# Patient Record
Sex: MALE | Race: WHITE | NOT HISPANIC OR LATINO | Employment: OTHER | URBAN - METROPOLITAN AREA
[De-identification: names, ages, dates, MRNs, and addresses within clinical notes are randomized per-mention and may not be internally consistent; named-entity substitution may affect disease eponyms.]

---

## 2018-12-20 ENCOUNTER — TELEPHONE (OUTPATIENT)
Dept: FAMILY MEDICINE CLINIC | Facility: CLINIC | Age: 73
End: 2018-12-20

## 2019-12-04 ENCOUNTER — APPOINTMENT (EMERGENCY)
Dept: CT IMAGING | Facility: HOSPITAL | Age: 74
End: 2019-12-04
Payer: COMMERCIAL

## 2019-12-04 ENCOUNTER — HOSPITAL ENCOUNTER (OUTPATIENT)
Facility: HOSPITAL | Age: 74
Setting detail: OBSERVATION
Discharge: HOME | End: 2019-12-04
Attending: EMERGENCY MEDICINE | Admitting: FAMILY MEDICINE
Payer: COMMERCIAL

## 2019-12-04 ENCOUNTER — APPOINTMENT (EMERGENCY)
Dept: RADIOLOGY | Facility: HOSPITAL | Age: 74
End: 2019-12-04
Payer: COMMERCIAL

## 2019-12-04 VITALS
TEMPERATURE: 98.4 F | OXYGEN SATURATION: 98 % | DIASTOLIC BLOOD PRESSURE: 82 MMHG | SYSTOLIC BLOOD PRESSURE: 165 MMHG | HEIGHT: 71 IN | RESPIRATION RATE: 16 BRPM | WEIGHT: 207.23 LBS | BODY MASS INDEX: 29.01 KG/M2 | HEART RATE: 58 BPM

## 2019-12-04 DIAGNOSIS — R07.9 CHEST PAIN: Primary | ICD-10-CM

## 2019-12-04 PROBLEM — Z98.61 CAD S/P PERCUTANEOUS CORONARY ANGIOPLASTY: Status: ACTIVE | Noted: 2019-12-04

## 2019-12-04 PROBLEM — C61 PROSTATE CANCER (HCC): Status: ACTIVE | Noted: 2019-12-04

## 2019-12-04 PROBLEM — E78.5 HYPERLIPIDEMIA: Status: ACTIVE | Noted: 2019-12-04

## 2019-12-04 PROBLEM — R00.1 BRADYCARDIA: Status: ACTIVE | Noted: 2019-12-04

## 2019-12-04 PROBLEM — I25.10 CAD S/P PERCUTANEOUS CORONARY ANGIOPLASTY: Status: ACTIVE | Noted: 2019-12-04

## 2019-12-04 PROBLEM — I10 HYPERTENSION: Status: ACTIVE | Noted: 2019-12-04

## 2019-12-04 PROBLEM — M10.9 GOUT: Status: ACTIVE | Noted: 2019-12-04

## 2019-12-04 LAB
ANION GAP SERPL CALCULATED.3IONS-SCNC: 6 MMOL/L (ref 4–13)
APTT PPP: 30 SECONDS (ref 23–37)
ATRIAL RATE: 47 BPM
ATRIAL RATE: 51 BPM
ATRIAL RATE: 55 BPM
BASOPHILS # BLD AUTO: 0.03 THOUSANDS/ΜL (ref 0–0.1)
BASOPHILS NFR BLD AUTO: 0 % (ref 0–1)
BUN SERPL-MCNC: 12 MG/DL (ref 5–25)
CALCIUM SERPL-MCNC: 9 MG/DL (ref 8.3–10.1)
CHLORIDE SERPL-SCNC: 108 MMOL/L (ref 100–108)
CHOLEST SERPL-MCNC: 123 MG/DL (ref 50–200)
CO2 SERPL-SCNC: 31 MMOL/L (ref 21–32)
CREAT SERPL-MCNC: 0.97 MG/DL (ref 0.6–1.3)
EOSINOPHIL # BLD AUTO: 0.21 THOUSAND/ΜL (ref 0–0.61)
EOSINOPHIL NFR BLD AUTO: 3 % (ref 0–6)
ERYTHROCYTE [DISTWIDTH] IN BLOOD BY AUTOMATED COUNT: 14.1 % (ref 11.6–15.1)
EST. AVERAGE GLUCOSE BLD GHB EST-MCNC: 111 MG/DL
GFR SERPL CREATININE-BSD FRML MDRD: 77 ML/MIN/1.73SQ M
GLUCOSE SERPL-MCNC: 91 MG/DL (ref 65–140)
HBA1C MFR BLD: 5.5 % (ref 4.2–6.3)
HCT VFR BLD AUTO: 40.9 % (ref 36.5–49.3)
HDLC SERPL-MCNC: 46 MG/DL
HGB BLD-MCNC: 13.2 G/DL (ref 12–17)
IMM GRANULOCYTES # BLD AUTO: 0.01 THOUSAND/UL (ref 0–0.2)
IMM GRANULOCYTES NFR BLD AUTO: 0 % (ref 0–2)
INR PPP: 1 (ref 0.84–1.19)
LDLC SERPL CALC-MCNC: 66 MG/DL (ref 0–100)
LYMPHOCYTES # BLD AUTO: 2.23 THOUSANDS/ΜL (ref 0.6–4.47)
LYMPHOCYTES NFR BLD AUTO: 33 % (ref 14–44)
MCH RBC QN AUTO: 33.3 PG (ref 26.8–34.3)
MCHC RBC AUTO-ENTMCNC: 32.3 G/DL (ref 31.4–37.4)
MCV RBC AUTO: 103 FL (ref 82–98)
MONOCYTES # BLD AUTO: 0.97 THOUSAND/ΜL (ref 0.17–1.22)
MONOCYTES NFR BLD AUTO: 14 % (ref 4–12)
NEUTROPHILS # BLD AUTO: 3.28 THOUSANDS/ΜL (ref 1.85–7.62)
NEUTS SEG NFR BLD AUTO: 50 % (ref 43–75)
NONHDLC SERPL-MCNC: 77 MG/DL
NRBC BLD AUTO-RTO: 0 /100 WBCS
P AXIS: 61 DEGREES
P AXIS: 69 DEGREES
P AXIS: 93 DEGREES
PLATELET # BLD AUTO: 244 THOUSANDS/UL (ref 149–390)
PLATELET # BLD AUTO: 282 THOUSANDS/UL (ref 149–390)
PMV BLD AUTO: 10 FL (ref 8.9–12.7)
PMV BLD AUTO: 9.8 FL (ref 8.9–12.7)
POTASSIUM SERPL-SCNC: 4 MMOL/L (ref 3.5–5.3)
PR INTERVAL: 176 MS
PR INTERVAL: 194 MS
PR INTERVAL: 216 MS
PROTHROMBIN TIME: 12.6 SECONDS (ref 11.6–14.5)
QRS AXIS: -49 DEGREES
QRS AXIS: -49 DEGREES
QRS AXIS: -53 DEGREES
QRSD INTERVAL: 132 MS
QRSD INTERVAL: 136 MS
QRSD INTERVAL: 142 MS
QT INTERVAL: 444 MS
QT INTERVAL: 478 MS
QT INTERVAL: 498 MS
QTC INTERVAL: 424 MS
QTC INTERVAL: 440 MS
QTC INTERVAL: 440 MS
RBC # BLD AUTO: 3.96 MILLION/UL (ref 3.88–5.62)
SODIUM SERPL-SCNC: 145 MMOL/L (ref 136–145)
T WAVE AXIS: 33 DEGREES
T WAVE AXIS: 43 DEGREES
T WAVE AXIS: 62 DEGREES
TRIGL SERPL-MCNC: 55 MG/DL
TROPONIN I SERPL-MCNC: 0.03 NG/ML
TROPONIN I SERPL-MCNC: 0.04 NG/ML
TROPONIN I SERPL-MCNC: 0.04 NG/ML
VENTRICULAR RATE: 47 BPM
VENTRICULAR RATE: 51 BPM
VENTRICULAR RATE: 55 BPM
WBC # BLD AUTO: 6.73 THOUSAND/UL (ref 4.31–10.16)

## 2019-12-04 PROCEDURE — 99236 HOSP IP/OBS SAME DATE HI 85: CPT | Performed by: FAMILY MEDICINE

## 2019-12-04 PROCEDURE — 99284 EMERGENCY DEPT VISIT MOD MDM: CPT | Performed by: EMERGENCY MEDICINE

## 2019-12-04 PROCEDURE — 36415 COLL VENOUS BLD VENIPUNCTURE: CPT | Performed by: EMERGENCY MEDICINE

## 2019-12-04 PROCEDURE — 99285 EMERGENCY DEPT VISIT HI MDM: CPT

## 2019-12-04 PROCEDURE — 85730 THROMBOPLASTIN TIME PARTIAL: CPT | Performed by: EMERGENCY MEDICINE

## 2019-12-04 PROCEDURE — 36415 COLL VENOUS BLD VENIPUNCTURE: CPT | Performed by: PHYSICIAN ASSISTANT

## 2019-12-04 PROCEDURE — 93010 ELECTROCARDIOGRAM REPORT: CPT | Performed by: INTERNAL MEDICINE

## 2019-12-04 PROCEDURE — 71275 CT ANGIOGRAPHY CHEST: CPT

## 2019-12-04 PROCEDURE — 84484 ASSAY OF TROPONIN QUANT: CPT | Performed by: PHYSICIAN ASSISTANT

## 2019-12-04 PROCEDURE — 80061 LIPID PANEL: CPT | Performed by: PHYSICIAN ASSISTANT

## 2019-12-04 PROCEDURE — 80048 BASIC METABOLIC PNL TOTAL CA: CPT | Performed by: EMERGENCY MEDICINE

## 2019-12-04 PROCEDURE — 74174 CTA ABD&PLVS W/CONTRAST: CPT

## 2019-12-04 PROCEDURE — 83036 HEMOGLOBIN GLYCOSYLATED A1C: CPT | Performed by: PHYSICIAN ASSISTANT

## 2019-12-04 PROCEDURE — 71046 X-RAY EXAM CHEST 2 VIEWS: CPT

## 2019-12-04 PROCEDURE — 85610 PROTHROMBIN TIME: CPT | Performed by: EMERGENCY MEDICINE

## 2019-12-04 PROCEDURE — 84484 ASSAY OF TROPONIN QUANT: CPT | Performed by: EMERGENCY MEDICINE

## 2019-12-04 PROCEDURE — 85049 AUTOMATED PLATELET COUNT: CPT | Performed by: PHYSICIAN ASSISTANT

## 2019-12-04 PROCEDURE — 85025 COMPLETE CBC W/AUTO DIFF WBC: CPT | Performed by: EMERGENCY MEDICINE

## 2019-12-04 PROCEDURE — 93005 ELECTROCARDIOGRAM TRACING: CPT

## 2019-12-04 RX ORDER — ATORVASTATIN CALCIUM 40 MG/1
40 TABLET, FILM COATED ORAL DAILY
COMMUNITY

## 2019-12-04 RX ORDER — ATORVASTATIN CALCIUM 40 MG/1
40 TABLET, FILM COATED ORAL DAILY
Status: DISCONTINUED | OUTPATIENT
Start: 2019-12-04 | End: 2019-12-04 | Stop reason: HOSPADM

## 2019-12-04 RX ORDER — FAMOTIDINE 20 MG/1
20 TABLET, FILM COATED ORAL 2 TIMES DAILY
Status: DISCONTINUED | OUTPATIENT
Start: 2019-12-04 | End: 2019-12-04 | Stop reason: HOSPADM

## 2019-12-04 RX ORDER — MELATONIN
1000 DAILY
COMMUNITY

## 2019-12-04 RX ORDER — ASPIRIN 81 MG/1
81 TABLET, CHEWABLE ORAL DAILY
Status: DISCONTINUED | OUTPATIENT
Start: 2019-12-04 | End: 2019-12-04 | Stop reason: HOSPADM

## 2019-12-04 RX ORDER — MELATONIN
1000 DAILY
Status: DISCONTINUED | OUTPATIENT
Start: 2019-12-04 | End: 2019-12-04 | Stop reason: HOSPADM

## 2019-12-04 RX ORDER — RAMIPRIL 5 MG/1
5 CAPSULE ORAL 2 TIMES DAILY
COMMUNITY

## 2019-12-04 RX ORDER — ACETAMINOPHEN 325 MG/1
650 TABLET ORAL EVERY 6 HOURS PRN
Status: DISCONTINUED | OUTPATIENT
Start: 2019-12-04 | End: 2019-12-04 | Stop reason: HOSPADM

## 2019-12-04 RX ORDER — ASPIRIN 81 MG/1
81 TABLET, CHEWABLE ORAL DAILY
COMMUNITY

## 2019-12-04 RX ORDER — ALLOPURINOL 300 MG/1
300 TABLET ORAL DAILY
COMMUNITY

## 2019-12-04 RX ORDER — ALLOPURINOL 300 MG/1
300 TABLET ORAL DAILY
Status: DISCONTINUED | OUTPATIENT
Start: 2019-12-04 | End: 2019-12-04 | Stop reason: HOSPADM

## 2019-12-04 RX ORDER — CALCIUM CARBONATE 200(500)MG
1000 TABLET,CHEWABLE ORAL DAILY PRN
Status: DISCONTINUED | OUTPATIENT
Start: 2019-12-04 | End: 2019-12-04 | Stop reason: HOSPADM

## 2019-12-04 RX ORDER — LISINOPRIL 5 MG/1
20 TABLET ORAL DAILY
Status: DISCONTINUED | OUTPATIENT
Start: 2019-12-04 | End: 2019-12-04 | Stop reason: HOSPADM

## 2019-12-04 RX ORDER — RANITIDINE 150 MG/1
150 CAPSULE ORAL 2 TIMES DAILY
COMMUNITY
End: 2021-01-12

## 2019-12-04 RX ADMIN — FAMOTIDINE 20 MG: 20 TABLET, FILM COATED ORAL at 09:25

## 2019-12-04 RX ADMIN — LISINOPRIL 20 MG: 10 TABLET ORAL at 09:25

## 2019-12-04 RX ADMIN — IOHEXOL 100 ML: 350 INJECTION, SOLUTION INTRAVENOUS at 03:42

## 2019-12-04 RX ADMIN — ALLOPURINOL 300 MG: 300 TABLET ORAL at 09:25

## 2019-12-04 NOTE — UTILIZATION REVIEW
Initial Clinical Review    Admission: Date/Time/Statement: 12/4/2019 0450 Observation   Orders Placed This Encounter   Procedures    Place in Observation     Standing Status:   Standing     Number of Occurrences:   1     Order Specific Question:   Admitting Physician     Answer:   Alex Singh [[id number]]     Order Specific Question:   Level of Care     Answer:   Med Surg [[id number]]     ED Arrival Information     Expected Arrival Acuity Means of Arrival Escorted By Service Admission Type    - 12/4/2019 02:14 Urgent Walk-In Self Hospitalist Urgent    Arrival Complaint    Chest pain,back pain        Chief Complaint   Patient presents with    Arm Pain     Patient reports left "pectoral" muscle feels rock hard  Patient also reports top of his left back/shoulder   Shoulder Pain     Assessment/Plan: this is a 69year old male from home to ED admitted to observation due to chest pain  Has history of CAD S/P percutaneous coronary angioplasty  Presented due to intermittent and worsening pain left side of chest over pectoral muscle  Had self resolved, today with shortness of breath  Troponin 0 03  EKG showed sinus with bifascicular block, non specific ST and T  Heart score of 5  Repeat EKG showed T wave inversion in lateral leads  Telemetry, serial troponin in progress       ED Triage Vitals   Temperature Pulse Respirations Blood Pressure SpO2   12/04/19 0236 12/04/19 0223 12/04/19 0223 12/04/19 0223 12/04/19 0223   98 5 °F (36 9 °C) 63 22 (!) 205/91 98 %      Temp Source Heart Rate Source Patient Position - Orthostatic VS BP Location FiO2 (%)   12/04/19 0236 12/04/19 0223 12/04/19 0223 12/04/19 0223 --   Oral Monitor Lying Right arm       Pain Score       12/04/19 0500       No Pain        Wt Readings from Last 1 Encounters:   12/04/19 94 kg (207 lb 3 7 oz)     Additional Vital Signs:   12/04/19 0546  98 4 °F (36 9 °C)  58  16  165/82  98 %  None (Room air)  Sitting   12/04/19 0500  --  56  16  152/82  97 %  None (Room air)  Sitting   12/04/19 0430  --  53Abnormal   17  173/78Abnormal   97 %  None (Room air)         Heart Score Risk Calculator   History  0 Filed at: 12/04/2019 0446   ECG  1 Filed at: 12/04/2019 0446   Age  2 Filed at: 12/04/2019 0446   Risk Factors  2 Filed at: 12/04/2019 0446   Troponin  0 Filed at: 12/04/2019 0446   HEART Score  5 Filed at: 12/04/2019 0446       Pertinent Labs/Diagnostic Test Results:   12/4/2019 EKG - rate of 55, sinus, left axis deviation, , bifascicular block, nonspecific ST and T-waves    12/4/2019 CTA dissection protocol chest/abdomen - No evidence of aortic aneurysm or dissection   No evidence of acute pathology throughout the chest, abdomen or pelvis    12/4/2019 CxR- No acute cardiopulmonary disease   Large hiatal hernia  Results from last 7 days   Lab Units 12/04/19  0607 12/04/19  0248   WBC Thousand/uL  --  6 73   HEMOGLOBIN g/dL  --  13 2   HEMATOCRIT %  --  40 9   PLATELETS Thousands/uL 244 282   NEUTROS ABS Thousands/µL  --  3 28     Results from last 7 days   Lab Units 12/04/19  0248   SODIUM mmol/L 145   POTASSIUM mmol/L 4 0   CHLORIDE mmol/L 108   CO2 mmol/L 31   ANION GAP mmol/L 6   BUN mg/dL 12   CREATININE mg/dL 0 97   EGFR ml/min/1 73sq m 77   CALCIUM mg/dL 9 0     Results from last 7 days   Lab Units 12/04/19  0248   GLUCOSE RANDOM mg/dL 91     Results from last 7 days   Lab Units 12/04/19  0607   HEMOGLOBIN A1C % 5 5   EAG mg/dl 111     Results from last 7 days   Lab Units 12/04/19  0908 12/04/19  0607 12/04/19  0248   TROPONIN I ng/mL 0 04 0 04 0 03     Results from last 7 days   Lab Units 12/04/19  0248   PROTIME seconds 12 6   INR  1 00   PTT seconds 30     ED Treatment:   Medication Administration from 12/04/2019 0214 to 12/04/2019 1031       Date/Time Order Dose Route Action Comments     12/04/2019 0925 allopurinol (ZYLOPRIM) tablet 300 mg 300 mg Oral Given      12/04/2019 0925 lisinopril (ZESTRIL) tablet 20 mg 20 mg Oral Given      12/04/2019 0997 famotidine (PEPCID) tablet 20 mg 20 mg Oral Given      12/04/2019 0954 cholecalciferol (VITAMIN D3) tablet 1,000 Units 1,000 Units Oral Not Given         Past Medical History:   Diagnosis Date    Cardiac disease     Hypertension      Present on Admission:  **None**      Admitting Diagnosis: Chest pain [R07 9]  Age/Sex: 69 y o  male  Admission Orders: 12/4/2019 0450 Observation   Scheduled Medications:  Medications:  allopurinol 300 mg Oral Daily   aspirin 81 mg Oral Daily   atorvastatin 40 mg Oral Daily   cholecalciferol 1,000 Units Oral Daily   enoxaparin 40 mg Subcutaneous Daily   famotidine 20 mg Oral BID   lisinopril 20 mg Oral Daily   metoprolol tartrate 25 mg Oral Daily     Continuous IV Infusions: none     PRN Meds: not used   acetaminophen 650 mg Oral Q6H PRN   calcium carbonate 1,000 mg Oral Daily PRN     telemetry    Network Utilization Review Department  [email or web address]  ATTENTION: Please call with any questions or concerns to [phone] and carefully listen to the prompts so that you are directed to the right person  All voicemails are confidential   Venkat Angelo all requests for admission clinical reviews, approved or denied determinations and any other requests to dedicated fax number below belonging to the campus where the patient is receiving treatment   List of dedicated fax numbers for the Facilities:  FACILITY NAME UR FAX NUMBER   ADMISSION DENIALS (Administrative/Medical Necessity) [phone]   PARENT CHILD HEALTH (Maternity/NICU/Pediatrics) [phone]   [facility] [phone]   [facility] [phone]   [address] [facility] [phone]

## 2019-12-04 NOTE — ED NOTES
SLIM at bedside      Anthony Santiago, [facility]  12/04/19 7497

## 2019-12-04 NOTE — DISCHARGE SUMMARY
Discharge- Faiza De La Torre 1945, 69 y o  male MRN: [id number]    Unit/Bed#: ED 25 Encounter: [id number]    Primary Care Provider: Kb Connors MD   Date and time admitted to hospital: 12/4/2019  2:20 AM      CAD S/P percutaneous coronary angioplasty  Assessment & Plan  · Patient with history of myocardial infarction x2  · Patient status post stent x4  · Patient states he had his 1st MI in 2003, and his 2nd in 2013  · Patient states he had 4 stents placed at [facility]   · He follows up with them for Cardiology  · Continue metoprolol  · Continue statin  · Continue ACE-inhibitor  · He states that he had a stress test done approximately 1 year ago by Dr Alexander Sutherland of [facility]   No records are available of this, however he states it was normal and he did not have chest pain with his stress test   · Patient does walk on treadmill 5 times a week at the gym      Prostate cancer [facility])  Assessment & Plan  · Patient does have a history of prostate cancer  · Follows up with outpatient Urology every 3 months  · Last visit was in October per patient  · Is due to follow-up in January  · He states that is currently stable and is being monitored at this time  · Deferred outpatient urology for follow-up    Gout  Assessment & Plan  · Continue allopurinol    Hyperlipidemia  Assessment & Plan  · Continue statin  Bradycardia  Assessment & Plan  · Heart rate has been in the 50s  Asymptomatic  Okay to continue metoprolol  Hypertension  Assessment & Plan  · Continue metoprolol and ramipril  * Chest pain  Assessment & Plan   Patient Presentation:  Patient presented with left-sided pectoral chest pain which radiated to the back  States he had for about 15-20 minutes while sleeping  Has had this before     Likely etiology:  Believe this is noncardiac, and believe this is more musculoskeletal   Patient states that he has a heart attack   KATELYNN: 2    Patient does have a history of coronary artery disease with stent placement  His chest pain resolved and his troponins were negative x3  He did have some T-wave inversions in his lateral leads on 2/3 of his EKGs  I reviewed previous EKGs obtained from [facility] from March of this year  They did not show T-wave inversions in the lateral leads  However, he states he is able to walk on the treadmill without chest pain  I did discuss over the phone with his cardiologist Dr Chioma Little  He states that patient is okay for discharge and he can follow up with him today or tomorrow  Will discharge patient home  Patient's chest pain did resolve  Disposition:     Home     Reason for Admission:  Chest pain    Discharge Diagnoses:     Principal Problem:    Chest pain  Active Problems:    Hypertension    Bradycardia    Hyperlipidemia    Gout    Prostate cancer ([facility] [address] )    CAD S/P percutaneous coronary angioplasty  Resolved Problems:    * No resolved hospital problems  *      Consultations During Hospital Stay:  · None    Procedures Performed:     · None    Significant Findings / Test Results:     CTA dissection protocol chest abdomen pelvis w wo contrast [[id number]] Collected: 12/04/19 0423   Order Status: Completed Updated: 12/04/19 0432   Narrative:     CT ANGIOGRAM OF THE CHEST, ABDOMEN AND PELVIS WITH AND WITHOUT IV CONTRAST    INDICATION:  None    COMPARISON: None      TECHNIQUE:  CT angiogram examination of the chest, abdomen and pelvis was performed according to standard protocol   This examination, like all CT scans performed in the [facility], was performed utilizing techniques to minimize   radiation dose exposure, including the use of iterative reconstruction and automated exposure control    Contrast as well as noncontrast images were obtained   3D reconstructions were performed an independent workstation, and are supplied for review     Rad dose 2647 mGy-cm     IV Contrast:  100 mL of iohexol (OMNIPAQUE)  Enteric Contrast:  None    FINDINGS:    VASCULAR STRUCTURES:  No evidence of aortic aneurysm or dissection   Mild calcific atherosclerotic disease throughout the thoracoabdominal aorta  OTHER FINDINGS:     CHEST:     HEART:  Severe coronary calcification   Normal cardiac size  No pericardial effusion  LUNGS:  Unremarkable  PLEURA: No pleural effusion  MEDIASTINUM AND DALLIN:  No mass or significant lymphadenopathy   Large hiatal hernia  CHEST WALL AND LOWER NECK: Normal     ABDOMEN    LIVER/BILIARY TREE:  Scattered hepatic cysts  Jimenez Kayla GALLBLADDER:  No calcified gallstones  No pericholecystic inflammatory change  SPLEEN:  Unremarkable   Normal size  PANCREAS:  Unremarkable  ADRENAL GLANDS:  Unremarkable  KIDNEYS/URETERS:  No solid renal mass  No hydronephrosis  No urinary tract calculi  PELVIS    REPRODUCTIVE ORGANS:  Unremarkable for patient's age  URINARY BLADDER:  Unremarkable  ADDITIONAL ABDOMINAL AND PELVIC STRUCTURES:     STOMACH AND BOWEL:  Unremarkable  ABDOMINOPELVIC CAVITY:  No pathologically enlarged mesenteric or retroperitoneal lymph nodes  No ascites or free intraperitoneal air  ABDOMINAL WALL/INGUINAL REGIONS:  Unremarkable  OSSEOUS STRUCTURES:  No acute fracture or destructive osseous lesion  Impression:       No evidence of aortic aneurysm or dissection   No evidence of acute pathology throughout the chest, abdomen or pelvis  Workstation performed: [id number]   XR chest 2 views [[id number]] Collected: 12/04/19 0840   Order Status: Completed Updated: 12/04/19 0843   Narrative:     CHEST     INDICATION:   sob  COMPARISON: Yue [facility] PERFORMED/VIEWS: [facility] [place] CHEST PA & LATERAL  Images: 2    FINDINGS:    Cardiomediastinal silhouette appears unremarkable  Genene Nan is a large hiatal hernia  Genene Nan is no evidence of heart failure  The lungs are clear   No pneumothorax or pleural effusion  Osseous structures appear within normal limits for patient age  Impression:       No acute cardiopulmonary disease   Large hiatal hernia  ·     Incidental Findings:   · None     Test Results Pending at Discharge (will require follow up): · None     Outpatient Tests Requested:  · None    Complications:  None    Hospital Course:     Patient was admitted for chest pain  He does have a history of coronary artery disease and goes to [facility]  His troponins were negative x3  His chest pain resolved  His EKGs did show some T-wave inversion in the lateral leads  Nonetheless, I discussed him with his outpatient cardiologist who stated that he should be fine for discharge and he can follow up today or tomorrow  Patient was then discharged  Condition at Discharge: stable     Discharge Day Visit / Exam:     * Please refer to separate progress note for these details *    Discharge instructions/Information to patient and family:   See after visit summary for information provided to patient and family  Provisions for Follow-Up Care:  See after visit summary for information related to follow-up care and any pertinent home health orders  Planned Readmission:  None    Discharge Statement:  I spent 30 minutes discharging the patient  This time was spent on the day of discharge  I had direct contact with the patient on the day of discharge  Greater than 50% of the total time was spent examining patient, answering all patient questions, arranging and discussing plan of care with patient as well as directly providing post-discharge instructions  Additional time then spent on discharge activities  Discharge Medications:  See after visit summary for reconciled discharge medications provided to patient and family  ** Please Note: This note has been constructed using a voice recognition system   **

## 2019-12-04 NOTE — ASSESSMENT & PLAN NOTE
· Patient with history of myocardial infarction x2  · Patient status post stent x4  · Patient states he had his 1st MI in 2003, and his 2nd in 2013  · Patient states he had 4 stents placed at [facility]   · He follows up with them for Cardiology  ·  he states it is stable at this time and has an appoint with his cardiologist later this week  · Continue metoprolol  · Continue statin  · Continue ACE-inhibitor    · He states that he had a stress test done approximately 1 year ago by Dr Tonio Christy of [facility]   No records are available of this, however he states it was normal and he did not have chest pain with his stress test   · Patient does walk on treadmill 5 times a week at the gym

## 2019-12-04 NOTE — ASSESSMENT & PLAN NOTE
· Patient does have a history of prostate cancer  · Follows up with outpatient Urology every 3 months  · Last visit was in October per patient  · Is due to follow-up in January  · He states that is currently stable and is being monitored at this time    · Deferred outpatient urology for follow-up

## 2019-12-04 NOTE — ASSESSMENT & PLAN NOTE
· Patient was sinus bradycardia with rates in the high 50s  · Patient did take metoprolol at night as he was instructed to by his cardiologist   · Patient is asymptomatic at this time  · Believe this is related to medications  · Will continue to monitor on telemetry

## 2019-12-04 NOTE — ASSESSMENT & PLAN NOTE
· Patient's blood pressures were significantly elevated on admission at 886 systolic  · Most recent pressure is 711 systolic  · Will continue to monitor  · Continue ramipril 5 mg  · Continue metoprolol

## 2019-12-04 NOTE — ASSESSMENT & PLAN NOTE
 Patient Presentation:  Patient presented with left-sided pectoral chest pain which radiated to the back  States he had for about 15-20 minutes while sleeping  Has had this before   Likely etiology:  Believe this is noncardiac, and believe this is more musculoskeletal   Patient states that he has a heart attack   KATELYNN: 2   Initial Troponin: negative   o Trend x3 or to peak   Initial EKG:  Right bundle-branch block with left anterior Vasc block  No ST T wave changes concerning for ischemia  EKG was bradycardic   o Monitor on telemetry   Check fasting lipid panel and A1c   Admit under Observation Status   Patient does follow with outpatient cardiology state and is due to see them later this month   Patient denies any chest pain or anginal symptoms at this time

## 2019-12-04 NOTE — H&P
H&P- Alisha Plasencia 1945, 69 y o  male MRN: [id number]    Unit/Bed#: ED 25 Encounter: [id number]    Primary Care Provider: Chloe Frank MD   Date and time admitted to hospital: 12/4/2019  2:20 AM        * Chest pain  Assessment & Plan   Patient Presentation:  Patient presented with left-sided pectoral chest pain which radiated to the back  States he had for about 15-20 minutes while sleeping  Has had this before   Likely etiology:  Believe this is noncardiac, and believe this is more musculoskeletal   Patient states that he has a heart attack   KATELYNN: 2   Initial Troponin: negative   o Trend x3 or to peak   Initial EKG:  Right bundle-branch block with left anterior Vasc block  No ST T wave changes concerning for ischemia  EKG was bradycardic   o Monitor on telemetry   Check fasting lipid panel and A1c   Admit under Observation Status   Patient does follow with outpatient cardiology state and is due to see them later this month   Patient denies any chest pain or anginal symptoms at this time  CAD S/P percutaneous coronary angioplasty  Assessment & Plan  · Patient with history of myocardial infarction x2  · Patient status post stent x4  · Patient states he had his 1st MI in 2003, and his 2nd in 2013  · Patient states he had 4 stents placed at [facility]   · He follows up with them for Cardiology  ·  he states it is stable at this time and has an appoint with his cardiologist later this week  · Continue metoprolol  · Continue statin  · Continue ACE-inhibitor  · He states that he had a stress test done approximately 1 year ago by Dr John Singh of [facility]   No records are available of this, however he states it was normal and he did not have chest pain with his stress test   · Patient does walk on treadmill 5 times a week at the gym      Prostate cancer [facility])  Assessment & Plan  · Patient does have a history of prostate cancer    · Follows up with outpatient Urology every 3 months  · Last visit was in October per patient  · Is due to follow-up in January  · He states that is currently stable and is being monitored at this time  · Deferred outpatient urology for follow-up    Hypertension  Assessment & Plan  · Patient's blood pressures were significantly elevated on admission at 880 systolic  · Most recent pressure is 010 systolic  · Will continue to monitor  · Continue ramipril 5 mg  · Continue metoprolol  Bradycardia  Assessment & Plan  · Patient was sinus bradycardia with rates in the high 50s  · Patient did take metoprolol at night as he was instructed to by his cardiologist   · Patient is asymptomatic at this time  · Believe this is related to medications  · Will continue to monitor on telemetry  Hyperlipidemia  Assessment & Plan  · Hyperlipidemia currently on statin  · Will check fasting lipids  · Continue statin    Gout  Assessment & Plan  · Continue allopurinol    VTE Prophylaxis: Enoxaparin (Lovenox)  / sequential compression device   Code Status:  Full code  POLST: There is no POLST form on file for this patient (pre-hospital)  Discussion with family:  Discussed with patient    Anticipated Length of Stay:  Patient will be admitted on an Observation basis with an anticipated length of stay of  < 2 midnights  Justification for Hospital Stay:  Chest pain rule out    Total Time for Visit, including Counseling / Coordination of Care: 45 minutes  Greater than 50% of this total time spent on direct patient counseling and coordination of care  Chief Complaint:   Left-sided chest pain with left-sided upper back pain    History of Present Illness:    Kenji Pantoja is a 69 y o  male who presents with left-sided chest pain  Patient's past medical history of hyperlipidemia, hypertension, coronary artery disease status post stenting x4, myocardial infarction x2 in 2013, in 2003, prostate cancer      Patient presents to the ED with one-day history of left-sided pectoral chest tightness with associated left-sided back pain  Patient states that he was sleeping when the symptoms occurred  He states that he has had these for the past 3 days  He states that the symptoms last approximately 15-20 minutes when they start  Patient states that he was sleeping when he 1st noticed the chest tightness happened  He denies any provocative factors  Patient states that he did not take anything for his pain  He denies any associated symptoms such as diaphoresis with the chest pain, he also denies any numbness tingling or pain in his arm or jaw  He does however state that he was having some slight back pain  He denies any shortness of breath with these symptoms  When asked how he is feeling now he states I feel great  Patient states that he does walk on a treadmill at the gym 5 days a week  He states that he was walking on the treadmill today and did not have any symptoms  Patient does follow up with outpatient Cardiology through [facility]   Patient states that he had a stress test approximately 1 year ago performed by Brant Kay at [facility]   No records are available to us of this  He does state however that the stress test was "normal" and that he did not have any pain with it  He states that the pain with the symptoms was approximately a 4/10 and dissipated completely upon arrival to the ED  He describes that when he was having his symptoms he reached out and touched his left pectoral muscle and that it felt hard like a rock  Of note patient did have significantly elevated blood pressure in the ED of 682 systolic  Blood pressures subsequently came down to 273 systolic during my exam   CTA was done in the ED to rule out dissection  CTA done in the ER was negative  Patient denies any symptoms of fever, chills, chest pain, shortness of breath, abdominal pain, nausea, vomiting, diarrhea at this time      Review of Systems:    Review of Systems Constitutional: Negative for chills, fatigue, fever and unexpected weight change  Respiratory: Positive for chest tightness  Negative for cough and shortness of breath  Cardiovascular: Positive for leg swelling  Negative for chest pain and palpitations  Does have what leg swelling but states that this is normal    Gastrointestinal: Negative for abdominal pain, diarrhea, nausea and vomiting  Genitourinary: Negative for decreased urine volume, dysuria, frequency and urgency  Musculoskeletal: Positive for back pain  Negative for arthralgias  Neurological: Negative for dizziness, syncope, light-headedness and headaches  All other systems reviewed and are negative  Past Medical and Surgical History:     Past Medical History:   Diagnosis Date    Hypertension        Past Surgical History:   Procedure Laterality Date    CARDIAC SURGERY      patient reports having 4 stents placed    TONSILLECTOMY         Meds/Allergies:    Prior to Admission medications    Medication Sig Start Date End Date Taking? Authorizing Provider   allopurinol (ZYLOPRIM) 300 mg tablet Take 300 mg by mouth daily   Yes Historical Provider, MD   aspirin 81 mg chewable tablet Chew 81 mg daily   Yes Historical Provider, MD   atorvastatin (LIPITOR) 40 mg tablet Take 40 mg by mouth daily   Yes Historical Provider, MD   cholecalciferol (VITAMIN D3) 1,000 units tablet Take 1,000 Units by mouth daily   Yes Historical Provider, MD   metoprolol tartrate (LOPRESSOR) 25 mg tablet Take 25 mg by mouth daily   Yes Historical Provider, MD   ramipril (ALTACE) 5 mg capsule Take 5 mg by mouth 2 (two) times a day   Yes Historical Provider, MD   ranitidine (ZANTAC) 150 MG capsule Take 150 mg by mouth 2 (two) times a day   Yes Historical Provider, MD     I have reviewed home medications with patient personally      Allergies: No Known Allergies    Social History:     Marital Status: /Civil Union   Occupation:  Retired  Patient Pre-hospital Living Situation:  Lives at home  Patient Pre-hospital Level of Mobility:  Ambulates  Patient Pre-hospital Diet Restrictions:  None  Substance Use History:   Social History     Substance and Sexual Activity   Alcohol Use Not on file     Social History     Tobacco Use   Smoking Status Not on file     Social History     Substance and Sexual Activity   Drug Use Not on file       Family History:    History reviewed  No pertinent family history  Physical Exam:     Vitals:   Blood Pressure: 165/82 (12/04/19 0546)  Pulse: 58 (12/04/19 0546)  Temperature: 98 4 °F (36 9 °C) (12/04/19 0546)  Temp Source: Oral (12/04/19 0546)  Respirations: 16 (12/04/19 0546)  Height: 5' 11" (180 3 cm) (12/04/19 0546)  Weight - Scale: 94 kg (207 lb 3 7 oz) (12/04/19 0546)  SpO2: 98 % (12/04/19 0546)    Physical Exam   Constitutional: He is oriented to person, place, and time  He appears well-developed and well-nourished  HENT:   Head: Normocephalic and atraumatic  Eyes: EOM are normal    Cardiovascular: Regular rhythm  Bradycardia present  Exam reveals distant heart sounds  No murmur heard  Pulmonary/Chest: Effort normal and breath sounds normal  He has no wheezes  He has no rales  Abdominal: Soft  Bowel sounds are normal  He exhibits no distension  There is no tenderness  Musculoskeletal: He exhibits edema  Patient does have left-sided lower extremity edema greater than right side  He does have pitting edema bilaterally  He states that the unilateral leg swelling is his baseline, and that is from surgery as a kid  Neurological: He is alert and oriented to person, place, and time  No cranial nerve deficit or sensory deficit  Skin: Skin is warm and dry  Nursing note and vitals reviewed  Additional Data:     Lab Results: I have personally reviewed pertinent reports        Results from last 7 days   Lab Units 12/04/19  0248   WBC Thousand/uL 6 73   HEMOGLOBIN g/dL 13 2   HEMATOCRIT % 40 9   PLATELETS Thousands/uL 282 NEUTROS PCT % 50   LYMPHS PCT % 33   MONOS PCT % 14*   EOS PCT % 3     Results from last 7 days   Lab Units 12/04/19  0248   SODIUM mmol/L 145   POTASSIUM mmol/L 4 0   CHLORIDE mmol/L 108   CO2 mmol/L 31   BUN mg/dL 12   CREATININE mg/dL 0 97   ANION GAP mmol/L 6   CALCIUM mg/dL 9 0   GLUCOSE RANDOM mg/dL 91     Results from last 7 days   Lab Units 12/04/19  0248   INR  1 00                   Imaging: I have personally reviewed pertinent reports  CTA dissection protocol chest abdomen pelvis w wo contrast   Final Result by Jacinda Carson MD (12/04 0431)      No evidence of aortic aneurysm or dissection  No evidence of acute pathology throughout the chest, abdomen or pelvis  Workstation performed: [id number]         XR chest 2 views    (Results Pending)       EKG, Pathology, and Other Studies Reviewed on Admission:   · EKG:  Each EKG shows left anterior Vasc block, and right bundle branch block  No ST T wave changes concerning for ischemia  EKG is sinus bradycardia with rates 55  Allscripts / Epic Records Reviewed: Yes     ** Please Note: This note has been constructed using a voice recognition system   **

## 2019-12-04 NOTE — ED PROVIDER NOTES
History  Chief Complaint   Patient presents with    Arm Pain     Patient reports left "pectoral" muscle feels rock hard  Patient also reports top of his left back/shoulder   Shoulder Pain       History provided by:  Patient   used: No    Arm Pain   Associated symptoms: chest pain    Associated symptoms: no abdominal pain, no congestion, no cough, no diarrhea, no fever, no headaches, no nausea, no rash, no shortness of breath, no sore throat, no vomiting and no wheezing    Shoulder Pain   Associated symptoms: back pain    Associated symptoms: no fever and no neck pain      Patient is a 52-year-old male presenting to emergency department with intermittent pain for last few days on the left side of the chest over the pectoral muscle area  Self resolved  Today while in bed head sudden onset pain of the left upper back and shoulder area  Self resolved  Hypertensive initially  No fevers or chills  No nausea vomiting  Has some shortness of breath with the pain today  Self resolved  Was coughing  No known trauma  Did not lift any heavy objects  History of 4 stents  Follows at [facility]     MDM cardiac eval, dissection study, re-evaluate  Prior to Admission Medications   Prescriptions Last Dose Informant Patient Reported?  Taking?   allopurinol (ZYLOPRIM) 300 mg tablet 12/3/2019 at Unknown time  Yes Yes   Sig: Take 300 mg by mouth daily   aspirin 81 mg chewable tablet 12/3/2019 at Unknown time  Yes Yes   Sig: Chew 81 mg daily   atorvastatin (LIPITOR) 40 mg tablet 12/3/2019 at Unknown time  Yes Yes   Sig: Take 40 mg by mouth daily   cholecalciferol (VITAMIN D3) 1,000 units tablet 12/3/2019 at Unknown time  Yes Yes   Sig: Take 1,000 Units by mouth daily   metoprolol tartrate (LOPRESSOR) 25 mg tablet 12/3/2019 at Unknown time  Yes Yes   Sig: Take 25 mg by mouth daily   ramipril (ALTACE) 5 mg capsule 12/3/2019 at Unknown time  Yes Yes   Sig: Take 5 mg by mouth 2 (two) times a day ranitidine (ZANTAC) 150 MG capsule 12/3/2019 at Unknown time  Yes Yes   Sig: Take 150 mg by mouth 2 (two) times a day      Facility-Administered Medications: None       Past Medical History:   Diagnosis Date    Cardiac disease     Hypertension        Past Surgical History:   Procedure Laterality Date    CARDIAC SURGERY      patient reports having 4 stents placed    TONSILLECTOMY         History reviewed  No pertinent family history  I have reviewed and agree with the history as documented  Social History     Tobacco Use    Smoking status: Former Smoker     Years: 0 00    Smokeless tobacco: Never Used   Substance Use Topics    Alcohol use: Not Currently    Drug use: Never        Review of Systems   Constitutional: Negative for chills, diaphoresis and fever  HENT: Negative for congestion and sore throat  Respiratory: Negative for cough, shortness of breath, wheezing and stridor  Cardiovascular: Positive for chest pain  Negative for palpitations and leg swelling  Gastrointestinal: Negative for abdominal pain, blood in stool, diarrhea, nausea and vomiting  Genitourinary: Negative for dysuria, frequency and urgency  Musculoskeletal: Positive for back pain  Negative for neck pain and neck stiffness  Skin: Negative for pallor and rash  Neurological: Negative for dizziness, syncope, weakness, light-headedness and headaches  All other systems reviewed and are negative  Physical Exam  Physical Exam   Constitutional: He is oriented to person, place, and time  He appears well-developed and well-nourished  HENT:   Head: Normocephalic and atraumatic  Eyes: Pupils are equal, round, and reactive to light  Neck: Neck supple  Cardiovascular: Normal rate, regular rhythm, normal heart sounds and intact distal pulses  Pulmonary/Chest: Effort normal and breath sounds normal  No respiratory distress  Abdominal: Soft  Bowel sounds are normal  There is no tenderness     Musculoskeletal: Normal range of motion  Neurological: He is alert and oriented to person, place, and time  Skin: Skin is warm and dry  Capillary refill takes less than 2 seconds  No rash noted  No erythema  No pallor  Vitals reviewed        Vital Signs  ED Triage Vitals   Temperature Pulse Respirations Blood Pressure SpO2   12/04/19 0236 12/04/19 0223 12/04/19 0223 12/04/19 0223 12/04/19 0223   98 5 °F (36 9 °C) 63 22 (!) 205/91 98 %      Temp Source Heart Rate Source Patient Position - Orthostatic VS BP Location FiO2 (%)   12/04/19 0236 12/04/19 0223 12/04/19 0223 12/04/19 0223 --   Oral Monitor Lying Right arm       Pain Score       12/04/19 0500       No Pain           Vitals:    12/04/19 0232 12/04/19 0430 12/04/19 0500 12/04/19 0546   BP: 163/93 (!) 173/78 152/82 165/82   Pulse:  (!) 53 56 58   Patient Position - Orthostatic VS: Sitting Sitting Sitting Sitting         Visual Acuity      ED Medications  Medications   iohexol (OMNIPAQUE) 350 MG/ML injection (MULTI-DOSE) 100 mL (100 mL Intravenous Given 12/4/19 0342)       Diagnostic Studies  Results Reviewed     Procedure Component Value Units Date/Time    Hemoglobin A1C w/ EAG Estimation [[id number]] Collected:  12/04/19 0607    Lab Status:  Final result Specimen:  Blood from Arm, Left Updated:  12/04/19 1023     Hemoglobin A1C 5 5 %       mg/dl     Troponin I [[id number]] Collected:  12/04/19 0908    Lab Status:  Final result Specimen:  Blood Updated:  12/04/19 0931     Troponin I 0 04 ng/mL     Troponin I [[id number]] Collected:  12/04/19 0607    Lab Status:  Final result Specimen:  Blood from Arm, Left Updated:  12/04/19 0636     Troponin I 0 04 ng/mL     Lipid panel [[id number]] Collected:  12/04/19 0607    Lab Status:  Final result Specimen:  Blood from Arm, Left Updated:  12/04/19 9603     Cholesterol 123 mg/dL      Triglycerides 55 mg/dL      HDL, Direct 46 mg/dL      LDL Calculated 66 mg/dL      Non-HDL-Chol (CHOL-HDL) 77 mg/dl     Platelet count [[id number]] (Normal) Collected:  12/04/19 0607    Lab Status:  Final result Specimen:  Blood from Arm, Left Updated:  12/04/19 0619     Platelets 649 Thousands/uL      MPV 9 8 fL     Troponin I [[id number]]  (Normal) Collected:  12/04/19 0248    Lab Status:  Final result Specimen:  Blood from Arm, Left Updated:  12/04/19 0319     Troponin I 0 03 ng/mL     Basic metabolic panel [[id number]] Collected:  12/04/19 0248    Lab Status:  Final result Specimen:  Blood from Arm, Left Updated:  12/04/19 0314     Sodium 145 mmol/L      Potassium 4 0 mmol/L      Chloride 108 mmol/L      CO2 31 mmol/L      ANION GAP 6 mmol/L      BUN 12 mg/dL      Creatinine 0 97 mg/dL      Glucose 91 mg/dL      Calcium 9 0 mg/dL      eGFR 77 ml/min/1 73sq m     Narrative:       [place] guidelines for Chronic Kidney Disease (CKD):     Stage 1 with normal or high GFR (GFR > 90 mL/min/1 73 square meters)    Stage 2 Mild CKD (GFR = 60-89 mL/min/1 73 square meters)    Stage 3A Moderate CKD (GFR = 45-59 mL/min/1 73 square meters)    Stage 3B Moderate CKD (GFR = 30-44 mL/min/1 73 square meters)    Stage 4 Severe CKD (GFR = 15-29 mL/min/1 73 square meters)    Stage 5 End Stage CKD (GFR <15 mL/min/1 73 square meters)  Note: GFR calculation is accurate only with a steady state creatinine    Protime-INR [[id number]]  (Normal) Collected:  12/04/19 0248    Lab Status:  Final result Specimen:  Blood from Arm, Left Updated:  12/04/19 0310     Protime 12 6 seconds      INR 1 00    APTT [[id number]]  (Normal) Collected:  12/04/19 0248    Lab Status:  Final result Specimen:  Blood from Arm, Left Updated:  12/04/19 0310     PTT 30 seconds     CBC and differential [[id number]]  (Abnormal) Collected:  12/04/19 0248    Lab Status:  Final result Specimen:  Blood from Arm, Left Updated:  12/04/19 0259     WBC 6 73 Thousand/uL      RBC 3 96 Million/uL      Hemoglobin 13 2 g/dL      Hematocrit 40 9 %       fL      MCH 33 3 pg      MCHC 32 3 g/dL RDW 14 1 %      MPV 10 0 fL      Platelets 646 Thousands/uL      nRBC 0 /100 WBCs      Neutrophils Relative 50 %      Immat GRANS % 0 %      Lymphocytes Relative 33 %      Monocytes Relative 14 %      Eosinophils Relative 3 %      Basophils Relative 0 %      Neutrophils Absolute 3 28 Thousands/µL      Immature Grans Absolute 0 01 Thousand/uL      Lymphocytes Absolute 2 23 Thousands/µL      Monocytes Absolute 0 97 Thousand/µL      Eosinophils Absolute 0 21 Thousand/µL      Basophils Absolute 0 03 Thousands/µL                  CTA dissection protocol chest abdomen pelvis w wo contrast   Final Result by Brian Leiva MD (12/04 0431)      No evidence of aortic aneurysm or dissection  No evidence of acute pathology throughout the chest, abdomen or pelvis  Workstation performed: [id number]         XR chest 2 views   Final Result by Joseph Sahni MD (02/11 3172)      No acute cardiopulmonary disease  Large hiatal hernia              Workstation performed: [id number]                    Procedures  Procedures         ED Course  ED Course as of Dec 10 1444   Wed Dec 04, 2019   0239 ECG shows rate of 55, sinus, left axis deviation, , bifascicular block, nonspecific ST and T-waves, independently interpreted by me            HEART Risk Score      Most Recent Value   History  0 Filed at: 12/04/2019 0446   ECG  1 Filed at: 12/04/2019 0446   Age  2 Filed at: 12/04/2019 0446   Risk Factors  2 Filed at: 12/04/2019 0446   Troponin  0 Filed at: 12/04/2019 0446   Heart Score Risk Calculator   History  0 Filed at: 12/04/2019 0446   ECG  1 Filed at: 12/04/2019 0446   Age  2 Filed at: 12/04/2019 0446   Risk Factors  2 Filed at: 12/04/2019 0446   Troponin  0 Filed at: 12/04/2019 0446   HEART Score  5 Filed at: 12/04/2019 0446   HEART Score  5 Filed at: 12/04/2019 0446                    KATELYNN Risk Score      Most Recent Value   Age >= 72  1 Filed at: 12/04/2019 0522   Known CAD (stenosis >= 50%)  0 Filed at: 12/04/2019 0522   Recent (<=24 hrs) Service Angina  0 Filed at: 12/04/2019 0522   ST Deviation >= 0 5 mm  0 Filed at: 12/04/2019 0522   3+ CAD Risk Factors (FHx, HTN, HLP, DM, Smoker)  0 Filed at: 12/04/2019 0522   Aspirin Use Past 7 Days  0 Filed at: 12/04/2019 0522   Elevated Cardiac Markers  0 Filed at: 12/04/2019 0522   KTAELYNN Risk Score (Calculated)  1 Filed at: 12/04/2019 1263              MDM      Disposition  Final diagnoses:   Chest pain     Time reflects when diagnosis was documented in both MDM as applicable and the Disposition within this note     Time User Action Codes Description Comment    12/4/2019  4:50 AM Zenia Reinoso [R07 9] Chest pain       ED Disposition     ED Disposition Condition Date/Time Comment    Admit Stable Wed Dec 4, 2019  4:50 AM Case was discussed with medicine and the patient's admission status was agreed to be Admission Status: observation status to the service of Dr Viktor Washburn   Follow-up Information     Follow up With Specialties Details Why Manjeet Khan MD Family Medicine Follow up Follow-up within 1-2 weeks   [id number] [facility]            Discharge Medication List as of 12/4/2019 11:43 AM      CONTINUE these medications which have NOT CHANGED    Details   allopurinol (ZYLOPRIM) 300 mg tablet Take 300 mg by mouth daily, Historical Med      aspirin 81 mg chewable tablet Chew 81 mg daily, Historical Med      atorvastatin (LIPITOR) 40 mg tablet Take 40 mg by mouth daily, Historical Med      cholecalciferol (VITAMIN D3) 1,000 units tablet Take 1,000 Units by mouth daily, Historical Med      metoprolol tartrate (LOPRESSOR) 25 mg tablet Take 25 mg by mouth daily, Historical Med      ramipril (ALTACE) 5 mg capsule Take 5 mg by mouth 2 (two) times a day, Historical Med      ranitidine (ZANTAC) 150 MG capsule Take 150 mg by mouth 2 (two) times a day, Historical Med           Outpatient Discharge Orders   Call provider for:  severe uncontrolled pain     Call provider for:  persistent dizziness or light-headedness       ED Provider  Electronically Signed by           Petr Palomares MD  12/10/19 [phone]

## 2019-12-04 NOTE — ASSESSMENT & PLAN NOTE
· Patient with history of myocardial infarction x2  · Patient status post stent x4  · Patient states he had his 1st MI in 2003, and his 2nd in 2013  · Patient states he had 4 stents placed at [facility]   · He follows up with them for Cardiology  · Continue metoprolol  · Continue statin  · Continue ACE-inhibitor    · He states that he had a stress test done approximately 1 year ago by Dr Lani Brewer of [facility]   No records are available of this, however he states it was normal and he did not have chest pain with his stress test   · Patient does walk on treadmill 5 times a week at the gym

## 2019-12-04 NOTE — ED NOTES
Pt is resting comfortably at this time with no complaints  Pt states that he feels much better than when he came in    Awaiting records from [facility] for past [address]  Héctor Duran , RN  12/04/19 [id number]

## 2019-12-04 NOTE — ASSESSMENT & PLAN NOTE
 Patient Presentation:  Patient presented with left-sided pectoral chest pain which radiated to the back  States he had for about 15-20 minutes while sleeping  Has had this before   Likely etiology:  Believe this is noncardiac, and believe this is more musculoskeletal   Patient states that he has a heart attack   KATELYNN: 2    Patient does have a history of coronary artery disease with stent placement  His chest pain resolved and his troponins were negative x3  He did have some T-wave inversions in his lateral leads on 2/3 of his EKGs  I reviewed previous EKGs obtained from [facility] from March of this year  They did not show T-wave inversions in the lateral leads  However, he states he is able to walk on the treadmill without chest pain  I did discuss over the phone with his cardiologist Dr Chioma Little  He states that patient is okay for discharge and he can follow up with him today or tomorrow  Will discharge patient home  Patient's chest pain did resolve

## 2020-06-26 ENCOUNTER — APPOINTMENT (EMERGENCY)
Dept: RADIOLOGY | Facility: HOSPITAL | Age: 75
End: 2020-06-26
Payer: COMMERCIAL

## 2020-06-26 ENCOUNTER — HOSPITAL ENCOUNTER (OUTPATIENT)
Facility: HOSPITAL | Age: 75
Setting detail: OBSERVATION
Discharge: HOME | End: 2020-06-27
Attending: EMERGENCY MEDICINE | Admitting: HOSPITALIST
Payer: COMMERCIAL

## 2020-06-26 DIAGNOSIS — R07.9 CHEST PAIN, UNSPECIFIED TYPE: Primary | ICD-10-CM

## 2020-06-26 LAB
ANION GAP SERPL CALCULATED.3IONS-SCNC: 3 MMOL/L (ref 4–13)
APTT PPP: 28 SECONDS (ref 23–37)
ATRIAL RATE: 53 BPM
BASOPHILS # BLD AUTO: 0.04 THOUSANDS/ΜL (ref 0–0.1)
BASOPHILS NFR BLD AUTO: 1 % (ref 0–1)
BUN SERPL-MCNC: 12 MG/DL (ref 5–25)
CALCIUM SERPL-MCNC: 9.2 MG/DL (ref 8.3–10.1)
CHLORIDE SERPL-SCNC: 109 MMOL/L (ref 100–108)
CO2 SERPL-SCNC: 31 MMOL/L (ref 21–32)
CREAT SERPL-MCNC: 0.98 MG/DL (ref 0.6–1.3)
EOSINOPHIL # BLD AUTO: 0.07 THOUSAND/ΜL (ref 0–0.61)
EOSINOPHIL NFR BLD AUTO: 1 % (ref 0–6)
ERYTHROCYTE [DISTWIDTH] IN BLOOD BY AUTOMATED COUNT: 14.1 % (ref 11.6–15.1)
GFR SERPL CREATININE-BSD FRML MDRD: 76 ML/MIN/1.73SQ M
GLUCOSE SERPL-MCNC: 108 MG/DL (ref 65–140)
HCT VFR BLD AUTO: 42.1 % (ref 36.5–49.3)
HGB BLD-MCNC: 13.6 G/DL (ref 12–17)
IMM GRANULOCYTES # BLD AUTO: 0.02 THOUSAND/UL (ref 0–0.2)
IMM GRANULOCYTES NFR BLD AUTO: 0 % (ref 0–2)
INR PPP: 1.09 (ref 0.84–1.19)
LYMPHOCYTES # BLD AUTO: 1.61 THOUSANDS/ΜL (ref 0.6–4.47)
LYMPHOCYTES NFR BLD AUTO: 24 % (ref 14–44)
MCH RBC QN AUTO: 33.5 PG (ref 26.8–34.3)
MCHC RBC AUTO-ENTMCNC: 32.3 G/DL (ref 31.4–37.4)
MCV RBC AUTO: 104 FL (ref 82–98)
MONOCYTES # BLD AUTO: 0.84 THOUSAND/ΜL (ref 0.17–1.22)
MONOCYTES NFR BLD AUTO: 13 % (ref 4–12)
NEUTROPHILS # BLD AUTO: 4.11 THOUSANDS/ΜL (ref 1.85–7.62)
NEUTS SEG NFR BLD AUTO: 61 % (ref 43–75)
NRBC BLD AUTO-RTO: 0 /100 WBCS
P AXIS: 48 DEGREES
PLATELET # BLD AUTO: 250 THOUSANDS/UL (ref 149–390)
PLATELET # BLD AUTO: 254 THOUSANDS/UL (ref 149–390)
PMV BLD AUTO: 10.1 FL (ref 8.9–12.7)
PMV BLD AUTO: 9.7 FL (ref 8.9–12.7)
POTASSIUM SERPL-SCNC: 4.8 MMOL/L (ref 3.5–5.3)
PR INTERVAL: 198 MS
PROTHROMBIN TIME: 13.5 SECONDS (ref 11.6–14.5)
QRS AXIS: -50 DEGREES
QRSD INTERVAL: 130 MS
QT INTERVAL: 462 MS
QTC INTERVAL: 426 MS
RBC # BLD AUTO: 4.06 MILLION/UL (ref 3.88–5.62)
SODIUM SERPL-SCNC: 143 MMOL/L (ref 136–145)
T WAVE AXIS: 46 DEGREES
TROPONIN I SERPL-MCNC: 0.02 NG/ML
TROPONIN I SERPL-MCNC: 0.03 NG/ML
TROPONIN I SERPL-MCNC: 0.03 NG/ML
TROPONIN I SERPL-MCNC: <0.02 NG/ML
VENTRICULAR RATE: 51 BPM
WBC # BLD AUTO: 6.69 THOUSAND/UL (ref 4.31–10.16)

## 2020-06-26 PROCEDURE — 84484 ASSAY OF TROPONIN QUANT: CPT | Performed by: EMERGENCY MEDICINE

## 2020-06-26 PROCEDURE — 93005 ELECTROCARDIOGRAM TRACING: CPT

## 2020-06-26 PROCEDURE — 85025 COMPLETE CBC W/AUTO DIFF WBC: CPT | Performed by: EMERGENCY MEDICINE

## 2020-06-26 PROCEDURE — 36415 COLL VENOUS BLD VENIPUNCTURE: CPT | Performed by: EMERGENCY MEDICINE

## 2020-06-26 PROCEDURE — 99220 PR INITIAL OBSERVATION CARE/DAY 70 MINUTES: CPT | Performed by: HOSPITALIST

## 2020-06-26 PROCEDURE — 99285 EMERGENCY DEPT VISIT HI MDM: CPT | Performed by: EMERGENCY MEDICINE

## 2020-06-26 PROCEDURE — 99285 EMERGENCY DEPT VISIT HI MDM: CPT

## 2020-06-26 PROCEDURE — 80048 BASIC METABOLIC PNL TOTAL CA: CPT | Performed by: EMERGENCY MEDICINE

## 2020-06-26 PROCEDURE — 93010 ELECTROCARDIOGRAM REPORT: CPT | Performed by: INTERNAL MEDICINE

## 2020-06-26 PROCEDURE — 71046 X-RAY EXAM CHEST 2 VIEWS: CPT

## 2020-06-26 PROCEDURE — 84484 ASSAY OF TROPONIN QUANT: CPT | Performed by: HOSPITALIST

## 2020-06-26 PROCEDURE — 85049 AUTOMATED PLATELET COUNT: CPT | Performed by: HOSPITALIST

## 2020-06-26 PROCEDURE — 85730 THROMBOPLASTIN TIME PARTIAL: CPT | Performed by: EMERGENCY MEDICINE

## 2020-06-26 PROCEDURE — 85610 PROTHROMBIN TIME: CPT | Performed by: EMERGENCY MEDICINE

## 2020-06-26 RX ORDER — ONDANSETRON 2 MG/ML
4 INJECTION INTRAMUSCULAR; INTRAVENOUS EVERY 6 HOURS PRN
Status: DISCONTINUED | OUTPATIENT
Start: 2020-06-26 | End: 2020-06-27 | Stop reason: HOSPADM

## 2020-06-26 RX ORDER — ATORVASTATIN CALCIUM 40 MG/1
40 TABLET, FILM COATED ORAL
Status: DISCONTINUED | OUTPATIENT
Start: 2020-06-27 | End: 2020-06-27 | Stop reason: HOSPADM

## 2020-06-26 RX ORDER — LISINOPRIL 20 MG/1
40 TABLET ORAL DAILY
Status: DISCONTINUED | OUTPATIENT
Start: 2020-06-27 | End: 2020-06-27 | Stop reason: HOSPADM

## 2020-06-26 RX ORDER — MELATONIN
1000 DAILY
Status: DISCONTINUED | OUTPATIENT
Start: 2020-06-26 | End: 2020-06-27 | Stop reason: HOSPADM

## 2020-06-26 RX ORDER — FAMOTIDINE 20 MG/1
20 TABLET, FILM COATED ORAL 2 TIMES DAILY
COMMUNITY
End: 2021-02-12

## 2020-06-26 RX ORDER — CALCIUM CARBONATE 200(500)MG
1000 TABLET,CHEWABLE ORAL 3 TIMES DAILY PRN
Status: DISCONTINUED | OUTPATIENT
Start: 2020-06-26 | End: 2020-06-27 | Stop reason: HOSPADM

## 2020-06-26 RX ORDER — FAMOTIDINE 20 MG/1
20 TABLET, FILM COATED ORAL 2 TIMES DAILY
Status: DISCONTINUED | OUTPATIENT
Start: 2020-06-26 | End: 2020-06-27 | Stop reason: HOSPADM

## 2020-06-26 RX ORDER — ASPIRIN 81 MG/1
81 TABLET, CHEWABLE ORAL DAILY
Status: DISCONTINUED | OUTPATIENT
Start: 2020-06-26 | End: 2020-06-27 | Stop reason: HOSPADM

## 2020-06-26 RX ORDER — ALLOPURINOL 300 MG/1
300 TABLET ORAL DAILY
Status: DISCONTINUED | OUTPATIENT
Start: 2020-06-27 | End: 2020-06-27 | Stop reason: HOSPADM

## 2020-06-26 RX ORDER — ALLOPURINOL 300 MG/1
300 TABLET ORAL DAILY
Status: DISCONTINUED | OUTPATIENT
Start: 2020-06-26 | End: 2020-06-26 | Stop reason: SDUPTHER

## 2020-06-26 RX ADMIN — METOPROLOL TARTRATE 25 MG: 25 TABLET, FILM COATED ORAL at 21:40

## 2020-06-26 RX ADMIN — ASPIRIN 81 MG 81 MG: 81 TABLET ORAL at 21:40

## 2020-06-26 RX ADMIN — FAMOTIDINE 20 MG: 20 TABLET, FILM COATED ORAL at 17:23

## 2020-06-26 RX ADMIN — ENOXAPARIN SODIUM 40 MG: 40 INJECTION SUBCUTANEOUS at 12:24

## 2020-06-27 VITALS
BODY MASS INDEX: 30.34 KG/M2 | RESPIRATION RATE: 19 BRPM | HEIGHT: 68 IN | TEMPERATURE: 97.8 F | WEIGHT: 200.2 LBS | SYSTOLIC BLOOD PRESSURE: 150 MMHG | OXYGEN SATURATION: 98 % | HEART RATE: 51 BPM | DIASTOLIC BLOOD PRESSURE: 70 MMHG

## 2020-06-27 PROBLEM — I10 ESSENTIAL HYPERTENSION: Chronic | Status: ACTIVE | Noted: 2019-12-04

## 2020-06-27 LAB
ANION GAP SERPL CALCULATED.3IONS-SCNC: 4 MMOL/L (ref 4–13)
BUN SERPL-MCNC: 14 MG/DL (ref 5–25)
CALCIUM SERPL-MCNC: 8.8 MG/DL (ref 8.3–10.1)
CHLORIDE SERPL-SCNC: 111 MMOL/L (ref 100–108)
CO2 SERPL-SCNC: 30 MMOL/L (ref 21–32)
CREAT SERPL-MCNC: 0.9 MG/DL (ref 0.6–1.3)
GFR SERPL CREATININE-BSD FRML MDRD: 84 ML/MIN/1.73SQ M
GLUCOSE P FAST SERPL-MCNC: 83 MG/DL (ref 65–99)
GLUCOSE SERPL-MCNC: 83 MG/DL (ref 65–140)
POTASSIUM SERPL-SCNC: 4.7 MMOL/L (ref 3.5–5.3)
SODIUM SERPL-SCNC: 145 MMOL/L (ref 136–145)

## 2020-06-27 PROCEDURE — 80048 BASIC METABOLIC PNL TOTAL CA: CPT | Performed by: HOSPITALIST

## 2020-06-27 PROCEDURE — 99217 PR OBSERVATION CARE DISCHARGE MANAGEMENT: CPT | Performed by: PHYSICIAN ASSISTANT

## 2020-06-27 RX ADMIN — ENOXAPARIN SODIUM 40 MG: 40 INJECTION SUBCUTANEOUS at 08:23

## 2020-06-27 RX ADMIN — FAMOTIDINE 20 MG: 20 TABLET, FILM COATED ORAL at 08:23

## 2020-06-27 RX ADMIN — ALLOPURINOL 300 MG: 300 TABLET ORAL at 08:24

## 2020-06-27 RX ADMIN — LISINOPRIL 40 MG: 20 TABLET ORAL at 08:24

## 2020-06-27 RX ADMIN — MELATONIN 1000 UNITS: at 08:24

## 2020-06-28 LAB
ATRIAL RATE: 49 BPM
ATRIAL RATE: 49 BPM
P AXIS: 11 DEGREES
P AXIS: 29 DEGREES
PR INTERVAL: 200 MS
PR INTERVAL: 212 MS
QRS AXIS: -40 DEGREES
QRS AXIS: -40 DEGREES
QRSD INTERVAL: 134 MS
QRSD INTERVAL: 140 MS
QT INTERVAL: 464 MS
QT INTERVAL: 466 MS
QTC INTERVAL: 419 MS
QTC INTERVAL: 420 MS
T WAVE AXIS: 55 DEGREES
T WAVE AXIS: 64 DEGREES
VENTRICULAR RATE: 49 BPM
VENTRICULAR RATE: 49 BPM

## 2020-06-28 PROCEDURE — 93010 ELECTROCARDIOGRAM REPORT: CPT | Performed by: INTERNAL MEDICINE

## 2020-07-06 ENCOUNTER — TRANSCRIBE ORDERS (OUTPATIENT)
Dept: ADMINISTRATIVE | Facility: HOSPITAL | Age: 75
End: 2020-07-06

## 2020-07-06 ENCOUNTER — APPOINTMENT (OUTPATIENT)
Dept: LAB | Facility: HOSPITAL | Age: 75
End: 2020-07-06
Payer: COMMERCIAL

## 2020-07-06 DIAGNOSIS — C61 MALIGNANT NEOPLASM OF PROSTATE (HCC): Primary | ICD-10-CM

## 2020-07-06 DIAGNOSIS — C61 MALIGNANT NEOPLASM OF PROSTATE (HCC): ICD-10-CM

## 2020-07-06 LAB — PSA SERPL-MCNC: 4.9 NG/ML (ref 0–4)

## 2020-07-06 PROCEDURE — G0103 PSA SCREENING: HCPCS

## 2020-07-06 PROCEDURE — 36415 COLL VENOUS BLD VENIPUNCTURE: CPT

## 2020-07-12 ENCOUNTER — HOSPITAL ENCOUNTER (EMERGENCY)
Facility: HOSPITAL | Age: 75
Discharge: HOME | End: 2020-07-12
Attending: EMERGENCY MEDICINE | Admitting: EMERGENCY MEDICINE
Payer: COMMERCIAL

## 2020-07-12 VITALS
RESPIRATION RATE: 20 BRPM | HEART RATE: 60 BPM | WEIGHT: 201 LBS | TEMPERATURE: 98.6 F | OXYGEN SATURATION: 98 % | DIASTOLIC BLOOD PRESSURE: 70 MMHG | BODY MASS INDEX: 30.46 KG/M2 | HEIGHT: 68 IN | SYSTOLIC BLOOD PRESSURE: 160 MMHG

## 2020-07-12 DIAGNOSIS — T18.128A ESOPHAGEAL OBSTRUCTION DUE TO FOOD IMPACTION: Primary | ICD-10-CM

## 2020-07-12 DIAGNOSIS — K22.2 ESOPHAGEAL OBSTRUCTION DUE TO FOOD IMPACTION: Primary | ICD-10-CM

## 2020-07-12 PROCEDURE — 99283 EMERGENCY DEPT VISIT LOW MDM: CPT

## 2020-07-12 PROCEDURE — 99282 EMERGENCY DEPT VISIT SF MDM: CPT | Performed by: EMERGENCY MEDICINE

## 2020-07-12 RX ORDER — NITROGLYCERIN 0.4 MG/1
1 TABLET SUBLINGUAL
COMMUNITY
Start: 2020-07-01

## 2020-07-12 NOTE — ED NOTES
Patient provided with 16oz warm, diet ginger ale per provider  Tolerated entire bolus of soda w/o any emesis or further phlegm production        Willis Montez RN  07/12/20 7800

## 2020-07-12 NOTE — ED PROVIDER NOTES
History  Chief Complaint   Patient presents with    Foreign Body in Throat     Patient reports having piece of fried fish lodged in throat s/p dinner @ 1630 today  Airway intact, but constantly coughing up phlegm  HPI     Patient is a pleasant 69year old male who presents with a piece of fernando stuck in his throat since dinner yesterday  No f/c/s  No chest pain  He is spitting up in a cup  Patient attempted to take his medication with some water which resulted in immediate regurgitation of the vomit  Here, patient was given warm diet ginger ale to drink which resulted in immediate resolution of his food bolus  MDM pleasant 69year old male, resolved food bolus, will have the patient followup with GI  Prior to Admission Medications   Prescriptions Last Dose Informant Patient Reported?  Taking?   allopurinol (ZYLOPRIM) 300 mg tablet   Yes Yes   Sig: Take 300 mg by mouth daily   aspirin 81 mg chewable tablet   Yes Yes   Sig: Chew 81 mg daily   atorvastatin (LIPITOR) 40 mg tablet   Yes Yes   Sig: Take 40 mg by mouth daily   cholecalciferol (VITAMIN D3) 1,000 units tablet   Yes Yes   Sig: Take 1,000 Units by mouth daily   famotidine (PEPCID) 20 mg tablet   Yes Yes   Sig: Take 20 mg by mouth 2 (two) times a day   metoprolol tartrate (LOPRESSOR) 25 mg tablet   Yes Yes   Sig: Take 25 mg by mouth daily   nitroglycerin (NITROSTAT) 0 4 mg SL tablet More than a month at Unknown time  Yes No   Sig: Place 1 tablet under the tongue every 5 (five) minutes as needed   ramipril (ALTACE) 5 mg capsule   Yes Yes   Sig: Take 5 mg by mouth 2 (two) times a day   ranitidine (ZANTAC) 150 MG capsule   Yes Yes   Sig: Take 150 mg by mouth 2 (two) times a day      Facility-Administered Medications: None       Past Medical History:   Diagnosis Date    Cardiac disease     Coronary artery disease     Hypercholesteremia     Hypertension        Past Surgical History:   Procedure Laterality Date    CARDIAC SURGERY      patient reports having 4 stents placed    COLONOSCOPY  07/2015    Due 7/2020    OTHER SURGICAL HISTORY      stents    TONSILLECTOMY         Family History   Problem Relation Age of Onset    Heart disease Father     Diabetes Paternal Uncle      I have reviewed and agree with the history as documented  E-Cigarette/Vaping    E-Cigarette Use Never User      E-Cigarette/Vaping Substances    Nicotine No     THC No     CBD No     Flavoring No     Other No     Unknown No      Social History     Tobacco Use    Smoking status: Former Smoker     Years: 0 00    Smokeless tobacco: Never Used    Tobacco comment: smoked in high school   Substance Use Topics    Alcohol use: Not Currently    Drug use: Never       Review of Systems   Gastrointestinal: Positive for vomiting  All other systems reviewed and are negative  Physical Exam  Physical Exam   Constitutional: He is oriented to person, place, and time  He appears well-developed and well-nourished  HENT:   Head: Normocephalic and atraumatic  Eyes: Pupils are equal, round, and reactive to light  EOM are normal    Neck: Normal range of motion  Neck supple  Cardiovascular: Normal rate, regular rhythm and normal heart sounds  No murmur heard  Pulmonary/Chest: Effort normal and breath sounds normal  No respiratory distress  He has no wheezes  Abdominal: Soft  Bowel sounds are normal  He exhibits no distension  There is no tenderness  Musculoskeletal: Normal range of motion  He exhibits no edema or tenderness  Neurological: He is alert and oriented to person, place, and time  Coordination normal    Skin: Skin is warm and dry  He is not diaphoretic  No erythema  Psychiatric: He has a normal mood and affect  His behavior is normal    Nursing note and vitals reviewed        Vital Signs  ED Triage Vitals   Temperature Pulse Respirations Blood Pressure SpO2   07/12/20 0419 07/12/20 0419 07/12/20 0419 07/12/20 0427 07/12/20 0419   98 6 °F (37 °C) 57 22 160/70 97 %      Temp Source Heart Rate Source Patient Position - Orthostatic VS BP Location FiO2 (%)   07/12/20 0419 07/12/20 0419 07/12/20 0427 07/12/20 0427 --   Oral Monitor Sitting Right arm       Pain Score       07/12/20 0419       4           Vitals:    07/12/20 0419 07/12/20 0427   BP:  160/70   Pulse: 57 60   Patient Position - Orthostatic VS:  Sitting         Visual Acuity      ED Medications  Medications - No data to display    Diagnostic Studies  Results Reviewed     None                 No orders to display              Procedures  Procedures         ED Course       US AUDIT      Most Recent Value   Initial Alcohol Screen: US AUDIT-C    1  How often do you have a drink containing alcohol?  0 Filed at: 07/12/2020 0421   2  How many drinks containing alcohol do you have on a typical day you are drinking? 0 Filed at: 07/12/2020 0421   3a  Male UNDER 65: How often do you have five or more drinks on one occasion? 0 Filed at: 07/12/2020 0421   3b  FEMALE Any Age, or MALE 65+: How often do you have 4 or more drinks on one occassion? 0 Filed at: 07/12/2020 0421   Audit-C Score  0 Filed at: 07/12/2020 0421                  CARLOS/DAST-10      Most Recent Value   How many times in the past year have you    Used an illegal drug or used a prescription medication for non-medical reasons? Never Filed at: 07/12/2020 0421                                MDM      Disposition  Final diagnoses:   Esophageal obstruction due to food impaction     Time reflects when diagnosis was documented in both MDM as applicable and the Disposition within this note     Time User Action Codes Description Comment    7/12/2020  4:27 AM [place] [facility] Add [K22 2,  N77 [id number]] Esophageal obstruction due to food impaction       ED Disposition     ED Disposition Condition Date/Time Comment    Discharge Stable Sun Jul 12, 2020  4:27 AM Marilyn Jaimes discharge to home/self care              Follow-up Information     Follow up With Specialties Details Why Contact Info Additional Information    [facility] Call in 2 days For follow up [address] [id number] [facility], [address] [facility] [address] [facility], [place], [place], [address]    Eduardo Watts MD Family Medicine Call in 2 days For follow up [address]  [facility] [address] Emergency Department Emergency Medicine Go to  If symptoms worsen [address] [id number] [phone] [facility] ED, [address], [place], [place], [address]          Discharge Medication List as of 7/12/2020  4:27 AM      CONTINUE these medications which have NOT CHANGED    Details   allopurinol (ZYLOPRIM) 300 mg tablet Take 300 mg by mouth daily, Historical Med      aspirin 81 mg chewable tablet Chew 81 mg daily, Historical Med      atorvastatin (LIPITOR) 40 mg tablet Take 40 mg by mouth daily, Historical Med      cholecalciferol (VITAMIN D3) 1,000 units tablet Take 1,000 Units by mouth daily, Historical Med      famotidine (PEPCID) 20 mg tablet Take 20 mg by mouth 2 (two) times a day, Historical Med      metoprolol tartrate (LOPRESSOR) 25 mg tablet Take 25 mg by mouth daily, Historical Med      ramipril (ALTACE) 5 mg capsule Take 5 mg by mouth 2 (two) times a day, Historical Med      ranitidine (ZANTAC) 150 MG capsule Take 150 mg by mouth 2 (two) times a day, Historical Med      nitroglycerin (NITROSTAT) 0 4 mg SL tablet Place 1 tablet under the tongue every 5 (five) minutes as needed, Starting Wed 7/1/2020, Historical Med           No discharge procedures on file      PDMP Review     None          ED Provider  Electronically Signed by           Dallas Lancaster MD  07/12/20 8126

## 2020-10-07 ENCOUNTER — TRANSCRIBE ORDERS (OUTPATIENT)
Dept: ADMINISTRATIVE | Facility: HOSPITAL | Age: 75
End: 2020-10-07

## 2020-10-07 ENCOUNTER — APPOINTMENT (OUTPATIENT)
Dept: LAB | Facility: HOSPITAL | Age: 75
End: 2020-10-07
Payer: COMMERCIAL

## 2020-10-07 DIAGNOSIS — C61 MALIGNANT NEOPLASM OF PROSTATE (HCC): Primary | ICD-10-CM

## 2020-10-07 DIAGNOSIS — C61 MALIGNANT NEOPLASM OF PROSTATE (HCC): ICD-10-CM

## 2020-10-07 PROCEDURE — G0103 PSA SCREENING: HCPCS

## 2020-10-07 PROCEDURE — 36415 COLL VENOUS BLD VENIPUNCTURE: CPT

## 2020-10-08 LAB — PSA SERPL-MCNC: 5.1 NG/ML (ref 0–4)

## 2020-12-07 ENCOUNTER — TELEPHONE (OUTPATIENT)
Dept: FAMILY MEDICINE CLINIC | Facility: CLINIC | Age: 75
End: 2020-12-07

## 2020-12-07 DIAGNOSIS — E55.9 VITAMIN D DEFICIENCY: ICD-10-CM

## 2020-12-07 DIAGNOSIS — I10 ESSENTIAL HYPERTENSION: Primary | ICD-10-CM

## 2020-12-14 ENCOUNTER — LAB (OUTPATIENT)
Dept: LAB | Facility: HOSPITAL | Age: 75
End: 2020-12-14
Attending: FAMILY MEDICINE
Payer: COMMERCIAL

## 2020-12-14 DIAGNOSIS — I10 ESSENTIAL HYPERTENSION: ICD-10-CM

## 2020-12-14 DIAGNOSIS — E55.9 VITAMIN D DEFICIENCY: ICD-10-CM

## 2020-12-14 LAB
25(OH)D3 SERPL-MCNC: 27.2 NG/ML (ref 30–100)
ALBUMIN SERPL BCP-MCNC: 3.9 G/DL (ref 3.4–4.8)
ALP SERPL-CCNC: 102 U/L (ref 10–129)
ALT SERPL W P-5'-P-CCNC: 18 U/L (ref 5–63)
ANION GAP SERPL CALCULATED.3IONS-SCNC: 7 MMOL/L (ref 4–13)
AST SERPL W P-5'-P-CCNC: 22 U/L (ref 15–41)
BILIRUB SERPL-MCNC: 0.88 MG/DL (ref 0.3–1.2)
BUN SERPL-MCNC: 11 MG/DL (ref 6–20)
CALCIUM SERPL-MCNC: 9.1 MG/DL (ref 8.4–10.2)
CHLORIDE SERPL-SCNC: 107 MMOL/L (ref 96–108)
CO2 SERPL-SCNC: 32 MMOL/L (ref 22–33)
CREAT SERPL-MCNC: 0.91 MG/DL (ref 0.5–1.2)
GFR SERPL CREATININE-BSD FRML MDRD: 83 ML/MIN/1.73SQ M
GLUCOSE P FAST SERPL-MCNC: 105 MG/DL (ref 70–105)
POTASSIUM SERPL-SCNC: 4 MMOL/L (ref 3.5–5)
PROT SERPL-MCNC: 6.4 G/DL (ref 6.4–8.3)
SODIUM SERPL-SCNC: 146 MMOL/L (ref 133–145)

## 2020-12-14 PROCEDURE — 80053 COMPREHEN METABOLIC PANEL: CPT

## 2020-12-14 PROCEDURE — 36415 COLL VENOUS BLD VENIPUNCTURE: CPT

## 2020-12-14 PROCEDURE — 82306 VITAMIN D 25 HYDROXY: CPT

## 2021-01-08 DIAGNOSIS — Z20.822 COVID-19 RULED OUT BY LABORATORY TESTING: ICD-10-CM

## 2021-01-08 PROCEDURE — U0005 INFEC AGEN DETEC AMPLI PROBE: HCPCS | Performed by: INTERNAL MEDICINE

## 2021-01-08 PROCEDURE — U0003 INFECTIOUS AGENT DETECTION BY NUCLEIC ACID (DNA OR RNA); SEVERE ACUTE RESPIRATORY SYNDROME CORONAVIRUS 2 (SARS-COV-2) (CORONAVIRUS DISEASE [COVID-19]), AMPLIFIED PROBE TECHNIQUE, MAKING USE OF HIGH THROUGHPUT TECHNOLOGIES AS DESCRIBED BY CMS-2020-01-R: HCPCS | Performed by: INTERNAL MEDICINE

## 2021-01-09 LAB — SARS-COV-2 RNA SPEC QL NAA+PROBE: NOT DETECTED

## 2021-01-11 ENCOUNTER — TRANSCRIBE ORDERS (OUTPATIENT)
Dept: ADMINISTRATIVE | Facility: HOSPITAL | Age: 76
End: 2021-01-11

## 2021-01-11 ENCOUNTER — LAB (OUTPATIENT)
Dept: LAB | Facility: HOSPITAL | Age: 76
End: 2021-01-11
Payer: COMMERCIAL

## 2021-01-11 DIAGNOSIS — C61 MALIGNANT NEOPLASM OF PROSTATE (HCC): Primary | ICD-10-CM

## 2021-01-11 DIAGNOSIS — C61 MALIGNANT NEOPLASM OF PROSTATE (HCC): ICD-10-CM

## 2021-01-11 LAB — PSA SERPL-MCNC: 5.8 NG/ML (ref 0–4)

## 2021-01-11 PROCEDURE — 36415 COLL VENOUS BLD VENIPUNCTURE: CPT

## 2021-01-11 PROCEDURE — G0103 PSA SCREENING: HCPCS

## 2021-01-12 NOTE — PRE-PROCEDURE INSTRUCTIONS
Pre-Surgery Instructions:   Medication Instructions    allopurinol (ZYLOPRIM) 300 mg tablet Patient was instructed by Physician and understands   aspirin 81 mg chewable tablet Patient was instructed by Physician and understands   atorvastatin (LIPITOR) 40 mg tablet Patient was instructed by Physician and understands   cholecalciferol (VITAMIN D3) 1,000 units tablet Patient was instructed by Physician and understands   famotidine (PEPCID) 20 mg tablet Patient was instructed by Physician and understands   metoprolol tartrate (LOPRESSOR) 25 mg tablet Instructed patient per Anesthesia Guidelines   nitroglycerin (NITROSTAT) 0 4 mg SL tablet Patient was instructed by Physician and understands   ramipril (ALTACE) 5 mg capsule Instructed patient per Anesthesia Guidelines

## 2021-01-13 ENCOUNTER — ANESTHESIA EVENT (OUTPATIENT)
Dept: GASTROENTEROLOGY | Facility: AMBULARY SURGERY CENTER | Age: 76
End: 2021-01-13

## 2021-01-13 PROBLEM — Z98.61 HISTORY OF PTCA: Status: ACTIVE | Noted: 2021-01-13

## 2021-01-14 ENCOUNTER — HOSPITAL ENCOUNTER (OUTPATIENT)
Dept: GASTROENTEROLOGY | Facility: AMBULARY SURGERY CENTER | Age: 76
Setting detail: OUTPATIENT SURGERY
Discharge: HOME | End: 2021-01-14
Attending: INTERNAL MEDICINE
Payer: COMMERCIAL

## 2021-01-14 ENCOUNTER — ANESTHESIA (OUTPATIENT)
Dept: GASTROENTEROLOGY | Facility: AMBULARY SURGERY CENTER | Age: 76
End: 2021-01-14

## 2021-01-14 VITALS
RESPIRATION RATE: 18 BRPM | SYSTOLIC BLOOD PRESSURE: 104 MMHG | BODY MASS INDEX: 30.31 KG/M2 | WEIGHT: 200 LBS | DIASTOLIC BLOOD PRESSURE: 61 MMHG | TEMPERATURE: 96.7 F | OXYGEN SATURATION: 98 % | HEART RATE: 56 BPM | HEIGHT: 68 IN

## 2021-01-14 VITALS — HEART RATE: 58 BPM

## 2021-01-14 DIAGNOSIS — K21.9 GASTRO-ESOPHAGEAL REFLUX DISEASE WITHOUT ESOPHAGITIS: ICD-10-CM

## 2021-01-14 DIAGNOSIS — Z20.822 COVID-19 RULED OUT BY LABORATORY TESTING: Primary | ICD-10-CM

## 2021-01-14 DIAGNOSIS — Z86.010 PERSONAL HISTORY OF COLONIC POLYPS: ICD-10-CM

## 2021-01-14 DIAGNOSIS — R13.10 DYSPHAGIA, UNSPECIFIED: ICD-10-CM

## 2021-01-14 PROBLEM — M19.90 ARTHRITIS: Status: ACTIVE | Noted: 2021-01-14

## 2021-01-14 PROCEDURE — 88305 TISSUE EXAM BY PATHOLOGIST: CPT | Performed by: PATHOLOGY

## 2021-01-14 PROCEDURE — 45380 COLONOSCOPY AND BIOPSY: CPT | Performed by: INTERNAL MEDICINE

## 2021-01-14 PROCEDURE — 43239 EGD BIOPSY SINGLE/MULTIPLE: CPT | Performed by: INTERNAL MEDICINE

## 2021-01-14 RX ORDER — SODIUM CHLORIDE, SODIUM LACTATE, POTASSIUM CHLORIDE, CALCIUM CHLORIDE 600; 310; 30; 20 MG/100ML; MG/100ML; MG/100ML; MG/100ML
75 INJECTION, SOLUTION INTRAVENOUS CONTINUOUS
Status: DISCONTINUED | OUTPATIENT
Start: 2021-01-14 | End: 2021-01-18 | Stop reason: HOSPADM

## 2021-01-14 RX ORDER — PROPOFOL 10 MG/ML
INJECTION, EMULSION INTRAVENOUS CONTINUOUS PRN
Status: DISCONTINUED | OUTPATIENT
Start: 2021-01-14 | End: 2021-01-14

## 2021-01-14 RX ORDER — SODIUM CHLORIDE, SODIUM LACTATE, POTASSIUM CHLORIDE, CALCIUM CHLORIDE 600; 310; 30; 20 MG/100ML; MG/100ML; MG/100ML; MG/100ML
20 INJECTION, SOLUTION INTRAVENOUS CONTINUOUS
Status: CANCELLED | OUTPATIENT
Start: 2021-01-14

## 2021-01-14 RX ORDER — ONDANSETRON 2 MG/ML
4 INJECTION INTRAMUSCULAR; INTRAVENOUS ONCE AS NEEDED
Status: CANCELLED | OUTPATIENT
Start: 2021-01-14

## 2021-01-14 RX ORDER — LIDOCAINE HYDROCHLORIDE 10 MG/ML
INJECTION, SOLUTION EPIDURAL; INFILTRATION; INTRACAUDAL; PERINEURAL AS NEEDED
Status: DISCONTINUED | OUTPATIENT
Start: 2021-01-14 | End: 2021-01-14

## 2021-01-14 RX ORDER — PROPOFOL 10 MG/ML
INJECTION, EMULSION INTRAVENOUS AS NEEDED
Status: DISCONTINUED | OUTPATIENT
Start: 2021-01-14 | End: 2021-01-14

## 2021-01-14 RX ADMIN — PROPOFOL 120 MCG/KG/MIN: 10 INJECTION, EMULSION INTRAVENOUS at 08:10

## 2021-01-14 RX ADMIN — LIDOCAINE HYDROCHLORIDE 50 MG: 10 INJECTION, SOLUTION EPIDURAL; INFILTRATION; INTRACAUDAL; PERINEURAL at 08:10

## 2021-01-14 RX ADMIN — PROPOFOL 100 MG: 10 INJECTION, EMULSION INTRAVENOUS at 08:10

## 2021-01-14 RX ADMIN — SODIUM CHLORIDE, SODIUM LACTATE, POTASSIUM CHLORIDE, AND CALCIUM CHLORIDE: .6; .31; .03; .02 INJECTION, SOLUTION INTRAVENOUS at 08:04

## 2021-01-14 NOTE — ANESTHESIA PREPROCEDURE EVALUATION
Procedure:  EGD  COLONOSCOPY    Relevant Problems   CARDIO   (+) CAD S/P percutaneous coronary angioplasty   (+) Chest pain   (+) Essential hypertension   (+) History of PTCA with 4 stents   (+) Hyperlipidemia      GI/HEPATIC   (+) Gastroesophageal reflux disease      /RENAL   (+) Prostate cancer (HCC)      MUSCULOSKELETAL   (+) Arthritis   (+) Gout        Physical Exam    Airway    Mallampati score: II  TM Distance: >3 FB  Neck ROM: full     Dental       Cardiovascular  Rhythm: regular, Rate: normal,     Pulmonary  Breath sounds clear to auscultation,     Other Findings        Anesthesia Plan  ASA Score- 3     Anesthesia Type- IV sedation with anesthesia with ASA Monitors  Additional Monitors:   Airway Plan:           Plan Factors-    Chart reviewed  Patient is not a current smoker  Induction- intravenous  Postoperative Plan-     Informed Consent- Anesthetic plan and risks discussed with patient  I personally reviewed this patient with the CRNA  Discussed and agreed on the Anesthesia Plan with the CRNA  Andrew Yo

## 2021-01-14 NOTE — H&P
History and Physical -  [facility]  Betty Estrada 77 y o  male MRN: [id number]                  HPI: Betty Estrada is a 77y o  year old male who presents for history of acid reflux colon polyps last colonoscopy 5 years ago      REVIEW OF SYSTEMS: Per the HPI, and otherwise unremarkable      Historical Information   Past Medical History:   Diagnosis Date    Cardiac disease     Colon polyp     Coronary artery disease     Hypercholesteremia     Hypertension     Kidney stone     Myocardial infarction ([address] )     5/04/2003: 2/8/2013     Past Surgical History:   Procedure Laterality Date    CARDIAC SURGERY      patient reports having 4 stents placed    COLONOSCOPY  07/2015    Due 7/2020    LITHOTRIPSY      x4    OTHER SURGICAL HISTORY      stents    TONSILLECTOMY       Social History   Social History     Substance and Sexual Activity   Alcohol Use Not Currently     Social History     Substance and Sexual Activity   Drug Use Never     Social History     Tobacco Use   Smoking Status Former Smoker    Years: 0 00   Smokeless Tobacco Never Used   Tobacco Comment    smoked in high school     Family History   Problem Relation Age of Onset    Heart disease Father     Diabetes Paternal Uncle        Meds/Allergies       Current Outpatient Medications:     allopurinol (ZYLOPRIM) 300 mg tablet    aspirin 81 mg chewable tablet    atorvastatin (LIPITOR) 40 mg tablet    cholecalciferol (VITAMIN D3) 1,000 units tablet    famotidine (PEPCID) 20 mg tablet    metoprolol tartrate (LOPRESSOR) 25 mg tablet    nitroglycerin (NITROSTAT) 0 4 mg SL tablet    ramipril (ALTACE) 5 mg capsule    Current Facility-Administered Medications:     lactated ringers infusion, 75 mL/hr, Intravenous, Continuous    No Known Allergies    Objective     /79   Pulse 58   Temp (!) 96 7 °F (35 9 °C) (Tympanic)   Resp 18   Ht 5' 8" (1 727 m)   Wt 90 7 kg (200 lb)   SpO2 100%   BMI 30 41 kg/m²       PHYSICAL EXAM    Gen: NAD  CV: RRR  CHEST: Clear  ABD: soft, NT/ND  EXT: no edema      ASSESSMENT/PLAN:  This is a 77y o  year old male here for EGD colonoscopy, and he is stable and optimized for his procedure

## 2021-01-14 NOTE — ANESTHESIA POSTPROCEDURE EVALUATION
Post-Op Assessment Note    CV Status:  Stable  Pain Score: 0    Pain management: adequate     Mental Status:  Sleepy   Hydration Status:  Stable   PONV Controlled:  None   Airway Patency:  Patent   Two or more mitigation strategies used for obstructive sleep apnea   Post Op Vitals Reviewed: Yes      Staff: CRNA         No complications documented      BP   117/68   Temp  98   Pulse  60   Resp 16   SpO2   98

## 2021-01-19 ENCOUNTER — TELEPHONE (OUTPATIENT)
Dept: FAMILY MEDICINE CLINIC | Facility: CLINIC | Age: 76
End: 2021-01-19

## 2021-02-12 DIAGNOSIS — K21.9 GASTROESOPHAGEAL REFLUX DISEASE WITHOUT ESOPHAGITIS: Primary | ICD-10-CM

## 2021-02-12 RX ORDER — FAMOTIDINE 20 MG/1
TABLET, FILM COATED ORAL
Qty: 180 TABLET | Refills: 3 | Status: SHIPPED | OUTPATIENT
Start: 2021-02-12 | End: 2021-12-13

## 2021-02-13 DIAGNOSIS — Z23 ENCOUNTER FOR IMMUNIZATION: ICD-10-CM

## 2021-02-15 ENCOUNTER — IMMUNIZATIONS (OUTPATIENT)
Dept: FAMILY MEDICINE CLINIC | Facility: HOSPITAL | Age: 76
End: 2021-02-15

## 2021-02-15 DIAGNOSIS — Z23 ENCOUNTER FOR IMMUNIZATION: Primary | ICD-10-CM

## 2021-02-15 PROCEDURE — 0001A SARS-COV-2 / COVID-19 MRNA VACCINE (PFIZER-BIONTECH) 30 MCG: CPT

## 2021-02-15 PROCEDURE — 91300 SARS-COV-2 / COVID-19 MRNA VACCINE (PFIZER-BIONTECH) 30 MCG: CPT

## 2021-03-08 ENCOUNTER — IMMUNIZATIONS (OUTPATIENT)
Dept: FAMILY MEDICINE CLINIC | Facility: HOSPITAL | Age: 76
End: 2021-03-08

## 2021-03-08 DIAGNOSIS — Z23 ENCOUNTER FOR IMMUNIZATION: Primary | ICD-10-CM

## 2021-03-08 PROCEDURE — 91300 SARS-COV-2 / COVID-19 MRNA VACCINE (PFIZER-BIONTECH) 30 MCG: CPT | Performed by: FAMILY MEDICINE

## 2021-03-08 PROCEDURE — 0002A SARS-COV-2 / COVID-19 MRNA VACCINE (PFIZER-BIONTECH) 30 MCG: CPT | Performed by: FAMILY MEDICINE

## 2021-03-18 ENCOUNTER — RA CDI HCC (OUTPATIENT)
Dept: OTHER | Facility: HOSPITAL | Age: 76
End: 2021-03-18

## 2021-03-18 NOTE — PROGRESS NOTES
[address] [address]  coding oppertunities             Chart reviewed, (number of) suggestions sent to provider: 2     Problem listed updated   Provider Accepted, (number of) suggestions accepted: 2              [address] [address]  coding oppertunities             Chart reviewed, (number of) suggestions sent to provider: 2                 I73 9 Peripheral vascular disease, unspecified ([address] [address] )  * see 12/20/20 podiatry note    C61 Prostate cancer [facility])     If this is correct, please document and assess at your next visit 3/35/21

## 2021-03-19 PROBLEM — I73.9 PVD (PERIPHERAL VASCULAR DISEASE) (HCC): Status: ACTIVE | Noted: 2021-03-19

## 2021-04-06 ENCOUNTER — TRANSCRIBE ORDERS (OUTPATIENT)
Dept: ADMINISTRATIVE | Facility: HOSPITAL | Age: 76
End: 2021-04-06

## 2021-04-06 ENCOUNTER — APPOINTMENT (OUTPATIENT)
Dept: LAB | Facility: HOSPITAL | Age: 76
End: 2021-04-06
Attending: SPECIALIST
Payer: COMMERCIAL

## 2021-04-06 DIAGNOSIS — C61 MALIGNANT NEOPLASM OF PROSTATE (HCC): ICD-10-CM

## 2021-04-06 DIAGNOSIS — C61 MALIGNANT NEOPLASM OF PROSTATE (HCC): Primary | ICD-10-CM

## 2021-04-06 LAB — PSA SERPL-MCNC: 6 NG/ML (ref 0–4)

## 2021-04-06 PROCEDURE — G0103 PSA SCREENING: HCPCS

## 2021-04-06 PROCEDURE — 36415 COLL VENOUS BLD VENIPUNCTURE: CPT

## 2021-04-27 ENCOUNTER — TRANSCRIBE ORDERS (OUTPATIENT)
Dept: ADMINISTRATIVE | Facility: HOSPITAL | Age: 76
End: 2021-04-27

## 2021-04-27 DIAGNOSIS — R97.20 ELEVATED PSA: Primary | ICD-10-CM

## 2021-04-28 ENCOUNTER — APPOINTMENT (OUTPATIENT)
Dept: LAB | Facility: CLINIC | Age: 76
End: 2021-04-28
Payer: COMMERCIAL

## 2021-04-28 DIAGNOSIS — R97.20 ELEVATED PSA: ICD-10-CM

## 2021-04-28 LAB
BUN SERPL-MCNC: 8 MG/DL (ref 5–25)
CREAT SERPL-MCNC: 0.88 MG/DL (ref 0.6–1.3)
GFR SERPL CREATININE-BSD FRML MDRD: 85 ML/MIN/1.73SQ M

## 2021-04-28 PROCEDURE — 82565 ASSAY OF CREATININE: CPT

## 2021-04-28 PROCEDURE — 84520 ASSAY OF UREA NITROGEN: CPT

## 2021-04-28 PROCEDURE — 36415 COLL VENOUS BLD VENIPUNCTURE: CPT

## 2021-05-18 ENCOUNTER — HOSPITAL ENCOUNTER (OUTPATIENT)
Dept: RADIOLOGY | Age: 76
Discharge: HOME | End: 2021-05-18
Payer: COMMERCIAL

## 2021-05-18 DIAGNOSIS — R97.20 ELEVATED PSA: ICD-10-CM

## 2021-05-18 PROCEDURE — 72197 MRI PELVIS W/O & W/DYE: CPT

## 2021-05-18 PROCEDURE — 76377 3D RENDER W/INTRP POSTPROCES: CPT

## 2021-05-18 PROCEDURE — A9585 GADOBUTROL INJECTION: HCPCS | Performed by: SPECIALIST

## 2021-05-18 RX ADMIN — GADOBUTROL 9 ML: 604.72 INJECTION INTRAVENOUS at 11:08

## 2021-07-29 ENCOUNTER — TELEPHONE (OUTPATIENT)
Dept: FAMILY MEDICINE CLINIC | Facility: CLINIC | Age: 76
End: 2021-07-29

## 2021-07-29 NOTE — TELEPHONE ENCOUNTER
Pt called to cancel his appt for August wellness  He stated he does not need to come in until December for his wellness     [employer]

## 2021-08-06 ENCOUNTER — APPOINTMENT (OUTPATIENT)
Dept: LAB | Facility: CLINIC | Age: 76
End: 2021-08-06
Payer: COMMERCIAL

## 2021-08-06 DIAGNOSIS — C61 MALIGNANT NEOPLASM OF PROSTATE (HCC): ICD-10-CM

## 2021-08-06 LAB — PSA SERPL-MCNC: 4.6 NG/ML (ref 0–4)

## 2021-08-06 PROCEDURE — 84153 ASSAY OF PSA TOTAL: CPT

## 2021-11-04 ENCOUNTER — RA CDI HCC (OUTPATIENT)
Dept: OTHER | Facility: HOSPITAL | Age: 76
End: 2021-11-04

## 2021-11-08 ENCOUNTER — APPOINTMENT (OUTPATIENT)
Dept: LAB | Facility: CLINIC | Age: 76
End: 2021-11-08
Payer: COMMERCIAL

## 2021-11-08 DIAGNOSIS — C61 MALIGNANT NEOPLASM OF PROSTATE (HCC): ICD-10-CM

## 2021-11-08 PROBLEM — E66.09 CLASS 1 OBESITY DUE TO EXCESS CALORIES WITH SERIOUS COMORBIDITY AND BODY MASS INDEX (BMI) OF 30.0 TO 30.9 IN ADULT: Status: ACTIVE | Noted: 2021-11-08

## 2021-11-08 PROBLEM — E79.0 HYPERURICEMIA: Status: ACTIVE | Noted: 2021-11-08

## 2021-11-08 PROBLEM — E55.9 VITAMIN D DEFICIENCY: Status: ACTIVE | Noted: 2021-11-08

## 2021-11-08 PROBLEM — E66.811 CLASS 1 OBESITY DUE TO EXCESS CALORIES WITH SERIOUS COMORBIDITY AND BODY MASS INDEX (BMI) OF 30.0 TO 30.9 IN ADULT: Status: ACTIVE | Noted: 2021-11-08

## 2021-11-08 PROBLEM — M1A.00X0 IDIOPATHIC CHRONIC GOUT WITHOUT TOPHUS: Status: RESOLVED | Noted: 2019-12-04 | Resolved: 2021-11-08

## 2021-11-08 PROBLEM — M1A.00X0 IDIOPATHIC CHRONIC GOUT WITHOUT TOPHUS: Status: ACTIVE | Noted: 2019-12-04

## 2021-11-08 LAB — PSA SERPL-MCNC: 4.4 NG/ML (ref 0–4)

## 2021-11-08 PROCEDURE — 84153 ASSAY OF PSA TOTAL: CPT

## 2021-11-10 ENCOUNTER — OFFICE VISIT (OUTPATIENT)
Dept: FAMILY MEDICINE CLINIC | Facility: CLINIC | Age: 76
End: 2021-11-10
Payer: COMMERCIAL

## 2021-11-10 VITALS
SYSTOLIC BLOOD PRESSURE: 122 MMHG | OXYGEN SATURATION: 96 % | RESPIRATION RATE: 16 BRPM | WEIGHT: 205 LBS | HEIGHT: 68 IN | TEMPERATURE: 97.2 F | HEART RATE: 52 BPM | DIASTOLIC BLOOD PRESSURE: 70 MMHG | BODY MASS INDEX: 31.07 KG/M2

## 2021-11-10 DIAGNOSIS — K21.9 GASTROESOPHAGEAL REFLUX DISEASE WITHOUT ESOPHAGITIS: Primary | ICD-10-CM

## 2021-11-10 DIAGNOSIS — I25.10 CAD S/P PERCUTANEOUS CORONARY ANGIOPLASTY: ICD-10-CM

## 2021-11-10 DIAGNOSIS — E79.0 HYPERURICEMIA: ICD-10-CM

## 2021-11-10 DIAGNOSIS — I10 ESSENTIAL HYPERTENSION: Chronic | ICD-10-CM

## 2021-11-10 DIAGNOSIS — C61 PROSTATE CANCER (HCC): ICD-10-CM

## 2021-11-10 DIAGNOSIS — Z11.59 NEED FOR HEPATITIS C SCREENING TEST: ICD-10-CM

## 2021-11-10 DIAGNOSIS — E78.5 DYSLIPIDEMIA: ICD-10-CM

## 2021-11-10 DIAGNOSIS — M19.90 ARTHRITIS: ICD-10-CM

## 2021-11-10 DIAGNOSIS — I73.9 PVD (PERIPHERAL VASCULAR DISEASE) (HCC): ICD-10-CM

## 2021-11-10 DIAGNOSIS — E55.9 VITAMIN D DEFICIENCY: ICD-10-CM

## 2021-11-10 DIAGNOSIS — E66.09 CLASS 1 OBESITY DUE TO EXCESS CALORIES WITH SERIOUS COMORBIDITY AND BODY MASS INDEX (BMI) OF 30.0 TO 30.9 IN ADULT: ICD-10-CM

## 2021-11-10 DIAGNOSIS — Z98.61 CAD S/P PERCUTANEOUS CORONARY ANGIOPLASTY: ICD-10-CM

## 2021-11-10 PROCEDURE — 1101F PT FALLS ASSESS-DOCD LE1/YR: CPT | Performed by: FAMILY MEDICINE

## 2021-11-10 PROCEDURE — 3725F SCREEN DEPRESSION PERFORMED: CPT | Performed by: FAMILY MEDICINE

## 2021-11-10 PROCEDURE — 1170F FXNL STATUS ASSESSED: CPT | Performed by: FAMILY MEDICINE

## 2021-11-10 PROCEDURE — G0439 PPPS, SUBSEQ VISIT: HCPCS | Performed by: FAMILY MEDICINE

## 2021-11-10 PROCEDURE — 99214 OFFICE O/P EST MOD 30 MIN: CPT | Performed by: FAMILY MEDICINE

## 2021-11-10 PROCEDURE — 3008F BODY MASS INDEX DOCD: CPT | Performed by: FAMILY MEDICINE

## 2021-11-10 PROCEDURE — 1160F RVW MEDS BY RX/DR IN RCRD: CPT | Performed by: FAMILY MEDICINE

## 2021-11-10 PROCEDURE — 1036F TOBACCO NON-USER: CPT | Performed by: FAMILY MEDICINE

## 2021-11-10 PROCEDURE — 1125F AMNT PAIN NOTED PAIN PRSNT: CPT | Performed by: FAMILY MEDICINE

## 2021-11-10 PROCEDURE — 3288F FALL RISK ASSESSMENT DOCD: CPT | Performed by: FAMILY MEDICINE

## 2021-12-01 ENCOUNTER — TELEPHONE (OUTPATIENT)
Dept: FAMILY MEDICINE CLINIC | Facility: CLINIC | Age: 76
End: 2021-12-01

## 2021-12-13 ENCOUNTER — APPOINTMENT (OUTPATIENT)
Dept: LAB | Facility: CLINIC | Age: 76
End: 2021-12-13
Payer: COMMERCIAL

## 2021-12-13 DIAGNOSIS — E78.5 DYSLIPIDEMIA: ICD-10-CM

## 2021-12-13 DIAGNOSIS — K21.9 GASTROESOPHAGEAL REFLUX DISEASE WITHOUT ESOPHAGITIS: ICD-10-CM

## 2021-12-13 DIAGNOSIS — Z11.59 NEED FOR HEPATITIS C SCREENING TEST: ICD-10-CM

## 2021-12-13 DIAGNOSIS — I10 ESSENTIAL HYPERTENSION: Chronic | ICD-10-CM

## 2021-12-13 LAB
ALBUMIN SERPL BCP-MCNC: 3.2 G/DL (ref 3.5–5)
ALP SERPL-CCNC: 130 U/L (ref 46–116)
ALT SERPL W P-5'-P-CCNC: 30 U/L (ref 12–78)
ANION GAP SERPL CALCULATED.3IONS-SCNC: 6 MMOL/L (ref 4–13)
AST SERPL W P-5'-P-CCNC: 29 U/L (ref 5–45)
BILIRUB SERPL-MCNC: 0.8 MG/DL (ref 0.2–1)
BUN SERPL-MCNC: 13 MG/DL (ref 5–25)
CALCIUM ALBUM COR SERPL-MCNC: 9.6 MG/DL (ref 8.3–10.1)
CALCIUM SERPL-MCNC: 9 MG/DL (ref 8.3–10.1)
CHLORIDE SERPL-SCNC: 107 MMOL/L (ref 100–108)
CHOLEST SERPL-MCNC: 138 MG/DL
CO2 SERPL-SCNC: 31 MMOL/L (ref 21–32)
CREAT SERPL-MCNC: 1.01 MG/DL (ref 0.6–1.3)
GFR SERPL CREATININE-BSD FRML MDRD: 72 ML/MIN/1.73SQ M
GLUCOSE P FAST SERPL-MCNC: 98 MG/DL (ref 65–99)
HCV AB SER QL: NORMAL
HDLC SERPL-MCNC: 53 MG/DL
LDLC SERPL CALC-MCNC: 73 MG/DL (ref 0–100)
NONHDLC SERPL-MCNC: 85 MG/DL
POTASSIUM SERPL-SCNC: 4.2 MMOL/L (ref 3.5–5.3)
PROT SERPL-MCNC: 7 G/DL (ref 6.4–8.2)
SODIUM SERPL-SCNC: 144 MMOL/L (ref 136–145)
TRIGL SERPL-MCNC: 58 MG/DL

## 2021-12-13 PROCEDURE — 36415 COLL VENOUS BLD VENIPUNCTURE: CPT

## 2021-12-13 PROCEDURE — 86803 HEPATITIS C AB TEST: CPT

## 2021-12-13 PROCEDURE — 80053 COMPREHEN METABOLIC PANEL: CPT

## 2021-12-13 PROCEDURE — 80061 LIPID PANEL: CPT

## 2021-12-13 RX ORDER — FAMOTIDINE 20 MG/1
TABLET, FILM COATED ORAL
Qty: 180 TABLET | Refills: 3 | Status: SHIPPED | OUTPATIENT
Start: 2021-12-13

## 2021-12-17 ENCOUNTER — OFFICE VISIT (OUTPATIENT)
Dept: GASTROENTEROLOGY | Facility: CLINIC | Age: 76
End: 2021-12-17
Payer: COMMERCIAL

## 2021-12-17 VITALS
HEIGHT: 68 IN | DIASTOLIC BLOOD PRESSURE: 80 MMHG | BODY MASS INDEX: 31.07 KG/M2 | HEART RATE: 56 BPM | SYSTOLIC BLOOD PRESSURE: 173 MMHG | WEIGHT: 205 LBS

## 2021-12-17 DIAGNOSIS — R74.8 ELEVATED ALKALINE PHOSPHATASE LEVEL: Primary | ICD-10-CM

## 2021-12-17 DIAGNOSIS — K57.90 DIVERTICULOSIS: ICD-10-CM

## 2021-12-17 DIAGNOSIS — K21.9 GASTROESOPHAGEAL REFLUX DISEASE WITHOUT ESOPHAGITIS: ICD-10-CM

## 2021-12-17 PROCEDURE — 1160F RVW MEDS BY RX/DR IN RCRD: CPT | Performed by: NURSE PRACTITIONER

## 2021-12-17 PROCEDURE — 1036F TOBACCO NON-USER: CPT | Performed by: NURSE PRACTITIONER

## 2021-12-17 PROCEDURE — 3008F BODY MASS INDEX DOCD: CPT | Performed by: NURSE PRACTITIONER

## 2021-12-17 PROCEDURE — 99213 OFFICE O/P EST LOW 20 MIN: CPT | Performed by: NURSE PRACTITIONER

## 2021-12-17 RX ORDER — METOPROLOL SUCCINATE 25 MG/1
TABLET, EXTENDED RELEASE ORAL
COMMUNITY
Start: 2021-10-07 | End: 2022-06-14

## 2021-12-17 RX ORDER — CHLORHEXIDINE GLUCONATE 0.12 MG/ML
RINSE ORAL
COMMUNITY
Start: 2021-11-15 | End: 2022-06-14

## 2022-01-15 ENCOUNTER — LAB (OUTPATIENT)
Dept: LAB | Facility: CLINIC | Age: 77
End: 2022-01-15
Payer: COMMERCIAL

## 2022-01-15 DIAGNOSIS — R74.8 ELEVATED ALKALINE PHOSPHATASE LEVEL: ICD-10-CM

## 2022-01-15 LAB
ALBUMIN SERPL BCP-MCNC: 3.3 G/DL (ref 3.5–5)
ALP SERPL-CCNC: 145 U/L (ref 46–116)
ALT SERPL W P-5'-P-CCNC: 30 U/L (ref 12–78)
AST SERPL W P-5'-P-CCNC: 26 U/L (ref 5–45)
BILIRUB DIRECT SERPL-MCNC: 0.18 MG/DL (ref 0–0.2)
BILIRUB SERPL-MCNC: 0.77 MG/DL (ref 0.2–1)
GGT SERPL-CCNC: 55 U/L (ref 5–85)
PROT SERPL-MCNC: 6.9 G/DL (ref 6.4–8.2)

## 2022-01-15 PROCEDURE — 80076 HEPATIC FUNCTION PANEL: CPT

## 2022-01-15 PROCEDURE — 82977 ASSAY OF GGT: CPT

## 2022-01-15 PROCEDURE — 84080 ASSAY ALKALINE PHOSPHATASES: CPT

## 2022-01-15 PROCEDURE — 36415 COLL VENOUS BLD VENIPUNCTURE: CPT

## 2022-01-15 PROCEDURE — 84075 ASSAY ALKALINE PHOSPHATASE: CPT

## 2022-01-19 ENCOUNTER — TELEPHONE (OUTPATIENT)
Dept: GASTROENTEROLOGY | Facility: CLINIC | Age: 77
End: 2022-01-19

## 2022-01-19 DIAGNOSIS — R74.8 ELEVATED ALKALINE PHOSPHATASE LEVEL: Primary | ICD-10-CM

## 2022-01-19 LAB
ALP BONE CFR SERPL: 31 % (ref 12–68)
ALP INTEST CFR SERPL: 0 % (ref 0–18)
ALP LIVER CFR SERPL: 69 % (ref 13–88)
ALP SERPL-CCNC: 151 IU/L (ref 44–121)

## 2022-01-19 NOTE — TELEPHONE ENCOUNTER
Reviewed labs with patient, will obtain liver serologies and RUQ US for further evaluation or isolated alk phos elevation  Orders placed

## 2022-01-19 NOTE — TELEPHONE ENCOUNTER
Patient had repeat BW done ordered at last appointment  Abnormal results and they state they were told an US would be ordered if results were still elevated  Please review and call patient    Thank you

## 2022-01-27 ENCOUNTER — HOSPITAL ENCOUNTER (OUTPATIENT)
Dept: ULTRASOUND IMAGING | Facility: HOSPITAL | Age: 77
Discharge: HOME | End: 2022-01-27
Payer: COMMERCIAL

## 2022-01-27 ENCOUNTER — APPOINTMENT (OUTPATIENT)
Dept: LAB | Facility: CLINIC | Age: 77
End: 2022-01-27
Payer: COMMERCIAL

## 2022-01-27 DIAGNOSIS — R74.8 ELEVATED ALKALINE PHOSPHATASE LEVEL: ICD-10-CM

## 2022-01-27 LAB — FERRITIN SERPL-MCNC: 53 NG/ML (ref 8–388)

## 2022-01-27 PROCEDURE — 86038 ANTINUCLEAR ANTIBODIES: CPT

## 2022-01-27 PROCEDURE — 82728 ASSAY OF FERRITIN: CPT

## 2022-01-27 PROCEDURE — 76705 ECHO EXAM OF ABDOMEN: CPT

## 2022-01-27 PROCEDURE — 83550 IRON BINDING TEST: CPT

## 2022-01-27 PROCEDURE — 86381 MITOCHONDRIAL ANTIBODY EACH: CPT

## 2022-01-27 PROCEDURE — 36415 COLL VENOUS BLD VENIPUNCTURE: CPT

## 2022-01-27 PROCEDURE — 83540 ASSAY OF IRON: CPT

## 2022-01-27 PROCEDURE — 86015 ACTIN ANTIBODY EACH: CPT

## 2022-01-28 ENCOUNTER — TELEPHONE (OUTPATIENT)
Dept: GASTROENTEROLOGY | Facility: CLINIC | Age: 77
End: 2022-01-28

## 2022-01-28 LAB
IRON SATN MFR SERPL: 23 % (ref 20–50)
IRON SERPL-MCNC: 74 UG/DL (ref 65–175)
RYE IGE QN: NEGATIVE
TIBC SERPL-MCNC: 327 UG/DL (ref 250–450)

## 2022-01-28 NOTE — TELEPHONE ENCOUNTER
I went over patient's results but there are still a few pending, so if you can call him back when you get the rest of-we can do another ultrasound in 6 months to assure stability of hepatic cysts

## 2022-01-28 NOTE — TELEPHONE ENCOUNTER
Patient's wife, Jule Baumgarten called asking for a call with results of US and BW when results are available    Thank you

## 2022-01-29 LAB
ACTIN IGG SERPL-ACNC: 7 UNITS (ref 0–19)
MITOCHONDRIA M2 IGG SER-ACNC: <20 UNITS (ref 0–20)

## 2022-02-09 ENCOUNTER — APPOINTMENT (OUTPATIENT)
Dept: LAB | Facility: CLINIC | Age: 77
End: 2022-02-09
Payer: COMMERCIAL

## 2022-02-09 DIAGNOSIS — R74.8 ELEVATED ALKALINE PHOSPHATASE LEVEL: ICD-10-CM

## 2022-02-09 LAB
HBV SURFACE AB SER-ACNC: <3.1 MIU/ML
HBV SURFACE AG SER QL: NORMAL

## 2022-02-09 PROCEDURE — 36415 COLL VENOUS BLD VENIPUNCTURE: CPT

## 2022-02-09 PROCEDURE — 86706 HEP B SURFACE ANTIBODY: CPT

## 2022-02-09 PROCEDURE — 87340 HEPATITIS B SURFACE AG IA: CPT

## 2022-03-29 ENCOUNTER — OFFICE VISIT (OUTPATIENT)
Dept: GASTROENTEROLOGY | Facility: CLINIC | Age: 77
End: 2022-03-29
Payer: COMMERCIAL

## 2022-03-29 VITALS
BODY MASS INDEX: 28.88 KG/M2 | DIASTOLIC BLOOD PRESSURE: 69 MMHG | SYSTOLIC BLOOD PRESSURE: 150 MMHG | HEIGHT: 69 IN | WEIGHT: 195 LBS | HEART RATE: 60 BPM

## 2022-03-29 DIAGNOSIS — K44.9 HIATAL HERNIA: ICD-10-CM

## 2022-03-29 DIAGNOSIS — K21.9 GASTROESOPHAGEAL REFLUX DISEASE WITHOUT ESOPHAGITIS: Primary | ICD-10-CM

## 2022-03-29 DIAGNOSIS — K76.89 HEPATIC CYST: ICD-10-CM

## 2022-03-29 DIAGNOSIS — Z86.010 PERSONAL HISTORY OF COLONIC POLYPS: ICD-10-CM

## 2022-03-29 DIAGNOSIS — K57.90 DIVERTICULAR DISEASE: ICD-10-CM

## 2022-03-29 DIAGNOSIS — D18.03 LIVER HEMANGIOMA: ICD-10-CM

## 2022-03-29 DIAGNOSIS — R74.8 ELEVATED ALKALINE PHOSPHATASE LEVEL: ICD-10-CM

## 2022-03-29 PROCEDURE — 99214 OFFICE O/P EST MOD 30 MIN: CPT | Performed by: INTERNAL MEDICINE

## 2022-03-29 PROCEDURE — 1160F RVW MEDS BY RX/DR IN RCRD: CPT | Performed by: INTERNAL MEDICINE

## 2022-03-29 PROCEDURE — 1036F TOBACCO NON-USER: CPT | Performed by: INTERNAL MEDICINE

## 2022-03-29 RX ORDER — AMOXICILLIN 250 MG
1 CAPSULE ORAL 2 TIMES DAILY
COMMUNITY
Start: 2022-02-18 | End: 2023-02-18

## 2022-03-29 NOTE — PROGRESS NOTES
[facility] - Outpatient Follow-up Note  Jarrod Canada 77 y o  male MRN: [id number]  Encounter: [id number]          ASSESSMENT AND PLAN:      1  Gastroesophageal reflux disease without esophagitis    2  Diverticular disease    3  Personal history of colonic polyps    4  Hiatal hernia    5  Hepatic cyst  -     US abdomen limited; Future; Expected date: 03/29/2022  -     US abdomen limited; Future; Expected date: 03/29/2022  -     Hepatic function panel; Future  -     Gamma GT; Future    6  Liver hemangioma  -     US abdomen limited; Future; Expected date: 03/29/2022  -     US abdomen limited; Future; Expected date: 03/29/2022    7  Elevated alkaline phosphatase level  -     Hepatic function panel; Future  -     Gamma GT; Future      History of ileum mild elevation of alkaline phosphatase, ultrasound abdomen unremarkable except for small cysts and hemangioma of the liver  History of GERD, anti-reflux measures reviewed discussed  History of diverticulosis and colon polyps  Repeat labs and ultrasound in 6 months time to monitor above  Patient and his wife quite concerned about these abnormalities, and her upset about in not able to make an office visit for 4 months      ______________________________________________________________________    SUBJECTIVE:   Patient evaluated examined, was found to have mild elevation of alkaline phosphatase, GGT normal, ultrasound of the liver reveals small cysts and possible liver hemangioma  Generally he is asymptomatic  Does have history of heartburn acid reflux indigestion, appetite is fair weight stable, denies any GI bleeding, bowel sometimes irregular  Diet medications more than 10 pertinent systems prior EGD colonoscopy biopsy results reviewed  REVIEW OF SYSTEMS IS OTHERWISE NEGATIVE        Historical Information   Past Medical History:   Diagnosis Date    Cardiac disease     Colon polyp     Coronary artery disease     Hypercholesteremia     Hypertension     Kidney stone     My[address] infarction [address] [address] )     5/04/2003: 2/8/2013     Past Surgical History:   Procedure Laterality Date    CARDIAC SURGERY      patient reports having 4 stents placed    COLONOSCOPY  07/2015    Due 7/2020    COLONOSCOPY      HERNIA REPAIR      LITHOTRIPSY      x4    OTHER SURGICAL HISTORY      stents    TONSILLECTOMY      UPPER GASTROINTESTINAL ENDOSCOPY       Social History   Social History     Substance and Sexual Activity   Alcohol Use Not Currently     Social History     Substance and Sexual Activity   Drug Use Never     Social History     Tobacco Use   Smoking Status Former Smoker    Years: 0 00   Smokeless Tobacco Never Used   Tobacco Comment    smoked in high school     Family History   Problem Relation Age of Onset    Heart disease Father     Diabetes Paternal Uncle        Meds/Allergies       Current Outpatient Medications:     allopurinol (ZYLOPRIM) 300 mg tablet    aspirin 81 mg chewable tablet    atorvastatin (LIPITOR) 40 mg tablet    chlorhexidine (PERIDEX) 0 12 % solution    cholecalciferol (VITAMIN D3) 1,000 units tablet    Diclofenac Sodium (VOLTAREN) 1 %    famotidine (PEPCID) 20 mg tablet    metoprolol succinate (TOPROL-XL) 25 mg 24 hr tablet    nitroglycerin (NITROSTAT) 0 4 mg SL tablet    ramipril (ALTACE) 5 mg capsule    senna-docusate sodium (SENOKOT S) 8 6-50 mg per tablet    metoprolol tartrate (LOPRESSOR) 25 mg tablet    No Known Allergies        Objective     Blood pressure 150/69, pulse 60, height 5' 9" (1 753 m), weight 88 5 kg (195 lb)  Body mass index is 28 8 kg/m²  PHYSICAL EXAM:      General Appearance:   Alert, cooperative, no distress   HEENT:   Normocephalic, atraumatic, anicteric  Neck:  Supple, symmetrical, trachea midline   Lungs:   Clear to auscultation bilaterally; no rales, rhonchi or wheezing; respirations unlabored    Heart[de-identified]   Regular rate and rhythm; no murmur     Abdomen: Soft, non-tender, non-distended; normal bowel sounds; no masses, no organomegaly    Genitalia:   Deferred    Rectal:   Deferred    Extremities:  No cyanosis, clubbing or edema    Skin:  No jaundice, rashes, or lesions    Lymph nodes:  No palpable cervical lymphadenopathy        Lab Results:   No visits with results within 1 Day(s) from this visit  Latest known visit with results is:   Appointment on 02/09/2022   Component Date Value    Hep B S Ab 02/09/2022 <3 10     Hepatitis B Surface Ag 02/09/2022 Non-reactive          Radiology Results:   No results found

## 2022-04-19 ENCOUNTER — APPOINTMENT (OUTPATIENT)
Dept: LAB | Facility: CLINIC | Age: 77
End: 2022-04-19
Payer: COMMERCIAL

## 2022-04-19 DIAGNOSIS — C61 MALIGNANT NEOPLASM OF PROSTATE (HCC): ICD-10-CM

## 2022-04-19 LAB — PSA SERPL-MCNC: 5.1 NG/ML (ref 0–4)

## 2022-04-19 PROCEDURE — 84153 ASSAY OF PSA TOTAL: CPT

## 2022-06-13 ENCOUNTER — APPOINTMENT (EMERGENCY)
Dept: RADIOLOGY | Facility: HOSPITAL | Age: 77
End: 2022-06-13
Payer: COMMERCIAL

## 2022-06-13 ENCOUNTER — HOSPITAL ENCOUNTER (OUTPATIENT)
Facility: HOSPITAL | Age: 77
Setting detail: OBSERVATION
Discharge: HOME | End: 2022-06-14
Attending: EMERGENCY MEDICINE | Admitting: INTERNAL MEDICINE
Payer: COMMERCIAL

## 2022-06-13 DIAGNOSIS — R07.9 CHEST PAIN WITH MODERATE RISK FOR CARDIAC ETIOLOGY: Primary | ICD-10-CM

## 2022-06-13 DIAGNOSIS — R07.9 CHEST PAIN, UNSPECIFIED TYPE: ICD-10-CM

## 2022-06-13 DIAGNOSIS — Z98.61 CAD S/P PERCUTANEOUS CORONARY ANGIOPLASTY: ICD-10-CM

## 2022-06-13 DIAGNOSIS — I25.10 CAD S/P PERCUTANEOUS CORONARY ANGIOPLASTY: ICD-10-CM

## 2022-06-13 DIAGNOSIS — Z98.61 HISTORY OF PTCA: ICD-10-CM

## 2022-06-13 LAB
2HR DELTA HS TROPONIN: 2 NG/L
4HR DELTA HS TROPONIN: 3 NG/L
ALBUMIN SERPL BCP-MCNC: 3.8 G/DL (ref 3.5–5)
ALP SERPL-CCNC: 178 U/L (ref 34–104)
ALT SERPL W P-5'-P-CCNC: 22 U/L (ref 7–52)
ANION GAP SERPL CALCULATED.3IONS-SCNC: 8 MMOL/L (ref 4–13)
AST SERPL W P-5'-P-CCNC: 26 U/L (ref 13–39)
BASOPHILS # BLD AUTO: 0.05 THOUSANDS/ΜL (ref 0–0.1)
BASOPHILS NFR BLD AUTO: 1 % (ref 0–1)
BILIRUB SERPL-MCNC: 0.62 MG/DL (ref 0.2–1)
BNP SERPL-MCNC: 114 PG/ML (ref 0–100)
BUN SERPL-MCNC: 14 MG/DL (ref 5–25)
CALCIUM SERPL-MCNC: 9.1 MG/DL (ref 8.4–10.2)
CARDIAC TROPONIN I PNL SERPL HS: 13 NG/L
CARDIAC TROPONIN I PNL SERPL HS: 15 NG/L
CARDIAC TROPONIN I PNL SERPL HS: 16 NG/L
CHLORIDE SERPL-SCNC: 106 MMOL/L (ref 96–108)
CO2 SERPL-SCNC: 28 MMOL/L (ref 21–32)
CREAT SERPL-MCNC: 0.96 MG/DL (ref 0.6–1.3)
EOSINOPHIL # BLD AUTO: 0.15 THOUSAND/ΜL (ref 0–0.61)
EOSINOPHIL NFR BLD AUTO: 2 % (ref 0–6)
ERYTHROCYTE [DISTWIDTH] IN BLOOD BY AUTOMATED COUNT: 16.1 % (ref 11.6–15.1)
GFR SERPL CREATININE-BSD FRML MDRD: 77 ML/MIN/1.73SQ M
GLUCOSE SERPL-MCNC: 102 MG/DL (ref 65–140)
HCT VFR BLD AUTO: 40.7 % (ref 36.5–49.3)
HGB BLD-MCNC: 13.2 G/DL (ref 12–17)
IMM GRANULOCYTES # BLD AUTO: 0.02 THOUSAND/UL (ref 0–0.2)
IMM GRANULOCYTES NFR BLD AUTO: 0 % (ref 0–2)
LYMPHOCYTES # BLD AUTO: 2.03 THOUSANDS/ΜL (ref 0.6–4.47)
LYMPHOCYTES NFR BLD AUTO: 26 % (ref 14–44)
MCH RBC QN AUTO: 30.8 PG (ref 26.8–34.3)
MCHC RBC AUTO-ENTMCNC: 32.4 G/DL (ref 31.4–37.4)
MCV RBC AUTO: 95 FL (ref 82–98)
MONOCYTES # BLD AUTO: 0.92 THOUSAND/ΜL (ref 0.17–1.22)
MONOCYTES NFR BLD AUTO: 12 % (ref 4–12)
NEUTROPHILS # BLD AUTO: 4.67 THOUSANDS/ΜL (ref 1.85–7.62)
NEUTS SEG NFR BLD AUTO: 59 % (ref 43–75)
NRBC BLD AUTO-RTO: 0 /100 WBCS
PLATELET # BLD AUTO: 191 THOUSANDS/UL (ref 149–390)
PMV BLD AUTO: 11.8 FL (ref 8.9–12.7)
POTASSIUM SERPL-SCNC: 4.4 MMOL/L (ref 3.5–5.3)
PROT SERPL-MCNC: 6.4 G/DL (ref 6.4–8.4)
RBC # BLD AUTO: 4.28 MILLION/UL (ref 3.88–5.62)
SODIUM SERPL-SCNC: 142 MMOL/L (ref 135–147)
WBC # BLD AUTO: 7.84 THOUSAND/UL (ref 4.31–10.16)

## 2022-06-13 PROCEDURE — 84484 ASSAY OF TROPONIN QUANT: CPT

## 2022-06-13 PROCEDURE — 80053 COMPREHEN METABOLIC PANEL: CPT

## 2022-06-13 PROCEDURE — 71045 X-RAY EXAM CHEST 1 VIEW: CPT

## 2022-06-13 PROCEDURE — 99285 EMERGENCY DEPT VISIT HI MDM: CPT | Performed by: EMERGENCY MEDICINE

## 2022-06-13 PROCEDURE — 99220 PR INITIAL OBSERVATION CARE/DAY 70 MINUTES: CPT | Performed by: PHYSICIAN ASSISTANT

## 2022-06-13 PROCEDURE — 99285 EMERGENCY DEPT VISIT HI MDM: CPT

## 2022-06-13 PROCEDURE — 83880 ASSAY OF NATRIURETIC PEPTIDE: CPT

## 2022-06-13 PROCEDURE — 36415 COLL VENOUS BLD VENIPUNCTURE: CPT

## 2022-06-13 PROCEDURE — 85025 COMPLETE CBC W/AUTO DIFF WBC: CPT

## 2022-06-13 PROCEDURE — 93005 ELECTROCARDIOGRAM TRACING: CPT

## 2022-06-13 RX ORDER — NITROGLYCERIN 0.4 MG/1
0.4 TABLET SUBLINGUAL ONCE
Status: COMPLETED | OUTPATIENT
Start: 2022-06-13 | End: 2022-06-13

## 2022-06-13 RX ADMIN — NITROGLYCERIN 0.4 MG: 0.4 TABLET SUBLINGUAL at 19:02

## 2022-06-13 NOTE — ED PROVIDER NOTES
History  Chief Complaint   Patient presents with    Chest Pain     Pt reports onset of CP 4pm after eating, tried 2 tums without relief, hx of 2 MIs     Brandon Saunders is a 50-year-old male with history of hiatal hernia and 2 prior myocardial infarction episodes 1 occurring in 2013 and another in 2003 requiring 4 stents total that presents to the emergency department with chest heaviness, nausea, vomiting that started before dinner this evening  The patient reports that roughly 3 hours ago his pain began and he felt this was more likely due to GERD and that he needed to eat something, however after dinner his pain persisted  He says this feels similar to MI that he had in the past   He denies diaphoresis or radiation of pain in says the pain is centralized to behind his sternum and in the epigastric region of his abdomen  The patient takes metoprolol and ramipril for blood pressure in the evenings and has been taking 1 week of urethra mycin for a cough, however he was never evaluated in person for this and was prescribed this antibiotic over the phone  The patient reports that his pain is improved since arrival but is still present  Prior to Admission Medications   Prescriptions Last Dose Informant Patient Reported? Taking?    Diclofenac Sodium (VOLTAREN) 1 %  Self Yes No   Sig: Apply 1 application topically 4 (four) times a day   allopurinol (ZYLOPRIM) 300 mg tablet  Self Yes No   Sig: Take 300 mg by mouth daily   aspirin 81 mg chewable tablet  Self Yes No   Sig: Chew 81 mg daily   atorvastatin (LIPITOR) 40 mg tablet  Self Yes No   Sig: Take 40 mg by mouth daily   chlorhexidine (PERIDEX) 0 12 % solution  Self Yes No   cholecalciferol (VITAMIN D3) 1,000 units tablet  Self Yes No   Sig: Take 1,000 Units by mouth daily   famotidine (PEPCID) 20 mg tablet  Self No No   Sig: TAKE 1 TABLET BY MOUTH  TWICE DAILY   metoprolol succinate (TOPROL-XL) 25 mg 24 hr tablet  Self Yes No   metoprolol tartrate (LOPRESSOR) 25 mg tablet  Self Yes No   Sig: Take 25 mg by mouth daily at bedtime    Patient not taking: Reported on 12/17/2021    nitroglycerin (NITROSTAT) 0 4 mg SL tablet  Self Yes No   Sig: Place 1 tablet under the tongue every 5 (five) minutes as needed   ramipril (ALTACE) 5 mg capsule  Self Yes No   Sig: Take 5 mg by mouth 2 (two) times a day     senna-docusate sodium (SENOKOT S) 8 6-50 mg per tablet  Self Yes No   Sig: Take 1 tablet by mouth 2 (two) times a day      Facility-Administered Medications: None       Past Medical History:   Diagnosis Date    Cardiac disease     Colon polyp     Coronary artery disease     Hypercholesteremia     Hypertension     Kidney stone     Myocardial infarction ([facility] [address] )     5/04/2003: 2/8/2013       Past Surgical History:   Procedure Laterality Date    CARDIAC SURGERY      patient reports having 4 stents placed    COLONOSCOPY  07/2015    Due 7/2020    COLONOSCOPY      HERNIA REPAIR      LITHOTRIPSY      x4    OTHER SURGICAL HISTORY      stents    TONSILLECTOMY      UPPER GASTROINTESTINAL ENDOSCOPY         Family History   Problem Relation Age of Onset    Heart disease Father     Diabetes Paternal Uncle      I have reviewed and agree with the history as documented  E-Cigarette/Vaping    E-Cigarette Use Never User      E-Cigarette/Vaping Substances    Nicotine No     THC No     CBD No     Flavoring No     Other No     Unknown No      Social History     Tobacco Use    Smoking status: Former Smoker     Years: 0 00    Smokeless tobacco: Never Used    Tobacco comment: smoked in high school   Vaping Use    Vaping Use: Never used   Substance Use Topics    Alcohol use: Not Currently    Drug use: Never        Review of Systems   Constitutional: Negative for chills and fever  HENT: Negative for ear pain and sore throat  Eyes: Negative for pain and visual disturbance  Respiratory: Positive for cough  Negative for shortness of breath      Cardiovascular: Positive for chest pain and leg swelling  Negative for palpitations  Gastrointestinal: Positive for abdominal pain (epigastric), nausea (Resolved) and vomiting ( resolved)  Negative for constipation and diarrhea  Genitourinary: Negative for dysuria and hematuria  Musculoskeletal: Negative for arthralgias and back pain  Skin: Negative for color change and rash  Neurological: Negative for dizziness, syncope, light-headedness and numbness  All other systems reviewed and are negative  Physical Exam  ED Triage Vitals   Temperature Pulse Respirations Blood Pressure SpO2   06/13/22 1758 06/13/22 1758 06/13/22 1758 06/13/22 1758 06/13/22 1758   98 7 °F (37 1 °C) 62 16 (!) 176/89 98 %      Temp Source Heart Rate Source Patient Position - Orthostatic VS BP Location FiO2 (%)   06/13/22 1758 06/13/22 1758 06/13/22 1758 06/13/22 1758 --   Oral Monitor Sitting Right arm       Pain Score       06/13/22 2319       No Pain             Orthostatic Vital Signs  Vitals:    06/13/22 2200 06/14/22 0100 06/14/22 0300 06/14/22 0700   BP: 156/72 150/72 121/64 160/82   Pulse: (!) 54 (!) 54 (!) 44 (!) 48   Patient Position - Orthostatic VS:    Sitting       Physical Exam  Vitals and nursing note reviewed  Constitutional:       General: He is in acute distress (mild due to pain)  Appearance: He is well-developed  He is not ill-appearing  HENT:      Head: Normocephalic and atraumatic  Eyes:      Extraocular Movements: Extraocular movements intact  Conjunctiva/sclera: Conjunctivae normal    Cardiovascular:      Rate and Rhythm: Normal rate and regular rhythm  Pulses: Normal pulses  Radial pulses are 2+ on the right side and 2+ on the left side  Dorsalis pedis pulses are 2+ on the right side and 2+ on the left side  Heart sounds: Normal heart sounds  No murmur heard  Pulmonary:      Effort: Pulmonary effort is normal  No respiratory distress  Breath sounds: Normal breath sounds   No decreased breath sounds, wheezing, rhonchi or rales  Chest:      Chest wall: No mass or deformity  Abdominal:      General: Abdomen is flat  Palpations: Abdomen is soft  Tenderness: There is no abdominal tenderness  There is no guarding or rebound  Musculoskeletal:         General: No swelling, tenderness or deformity  Normal range of motion  Cervical back: Normal range of motion and neck supple  Right lower leg: Edema present  Left lower leg: Edema present  Skin:     General: Skin is warm and dry  Capillary Refill: Capillary refill takes less than 2 seconds  Neurological:      Mental Status: He is alert           ED Medications  Medications   allopurinol (ZYLOPRIM) tablet 300 mg (300 mg Oral Given 6/14/22 2220)   aspirin chewable tablet 81 mg (81 mg Oral Given 6/14/22 0822)   atorvastatin (LIPITOR) tablet 40 mg (40 mg Oral Given 6/14/22 3102)   cholecalciferol (VITAMIN D3) tablet 1,000 Units (1,000 Units Oral Given 6/14/22 0822)   famotidine (PEPCID) tablet 20 mg (20 mg Oral Given 6/14/22 0827)   metoprolol succinate (TOPROL-XL) 24 hr tablet 25 mg (25 mg Oral Given 6/14/22 0822)   lisinopril (ZESTRIL) tablet 20 mg (20 mg Oral Given 6/14/22 0822)   enoxaparin (LOVENOX) subcutaneous injection 40 mg (40 mg Subcutaneous Given 6/14/22 0822)   acetaminophen (TYLENOL) tablet 650 mg (has no administration in time range)   nitroglycerin (NITROSTAT) SL tablet 0 4 mg (0 4 mg Sublingual Given 6/13/22 1902)       Diagnostic Studies  Results Reviewed     Procedure Component Value Units Date/Time    Lipid panel [[id number]] Collected: 06/14/22 0446    Lab Status: Final result Specimen: Blood from Arm, Right Updated: 06/14/22 0559     Cholesterol 121 mg/dL      Triglycerides 83 mg/dL      HDL, Direct 41 mg/dL      LDL Calculated 63 mg/dL      Non-HDL-Chol (CHOL-HDL) 80 mg/dl     HS Troponin I 4hr [[id number]]  (Normal) Collected: 06/13/22 2256    Lab Status: Final result Specimen: Blood from Arm, Left Updated: 06/13/22 2333     hs TnI 4hr 16 ng/L      Delta 4hr hsTnI 3 ng/L     HS Troponin I 2hr [[id number]]  (Normal) Collected: 06/13/22 2058    Lab Status: Final result Specimen: Blood from Arm, Left Updated: 06/13/22 2136     hs TnI 2hr 15 ng/L      Delta 2hr hsTnI 2 ng/L     B-Type Natriuretic Peptide(BNP) AN, CA, EA Campuses Only [[id number]]  (Abnormal) Collected: 06/13/22 1843    Lab Status: Final result Specimen: Blood from Arm, Left Updated: 06/13/22 2046      pg/mL     HS Troponin 0hr (reflex protocol) [[id number]]  (Normal) Collected: 06/13/22 1843    Lab Status: Final result Specimen: Blood from Arm, Left Updated: 06/13/22 1945     hs TnI 0hr 13 ng/L     Comprehensive metabolic panel [[id number]]  (Abnormal) Collected: 06/13/22 1843    Lab Status: Final result Specimen: Blood from Arm, Left Updated: 06/13/22 1932     Sodium 142 mmol/L      Potassium 4 4 mmol/L      Chloride 106 mmol/L      CO2 28 mmol/L      ANION GAP 8 mmol/L      BUN 14 mg/dL      Creatinine 0 96 mg/dL      Glucose 102 mg/dL      Calcium 9 1 mg/dL      AST 26 U/L      ALT 22 U/L      Alkaline Phosphatase 178 U/L      Total Protein 6 4 g/dL      Albumin 3 8 g/dL      Total Bilirubin 0 62 mg/dL      eGFR 77 ml/min/1 73sq m     Narrative:      [facility] [place] operated by [facility] guidelines for Chronic Kidney Disease (CKD):     Stage 1 with normal or high GFR (GFR > 90 mL/min/1 73 square meters)    Stage 2 Mild CKD (GFR = 60-89 mL/min/1 73 square meters)    Stage 3A Moderate CKD (GFR = 45-59 mL/min/1 73 square meters)    Stage 3B Moderate CKD (GFR = 30-44 mL/min/1 73 square meters)    Stage 4 Severe CKD (GFR = 15-29 mL/min/1 73 square meters)    Stage 5 End Stage CKD (GFR <15 mL/min/1 73 square meters)  Note: GFR calculation is accurate only with a steady state creatinine    CBC and differential [[id number]]  (Abnormal) Collected: 06/13/22 1843    Lab Status: Final result Specimen: Blood from Arm, Left Updated: 06/13/22 1909     WBC 7 84 Thousand/uL      RBC 4 28 Million/uL      Hemoglobin 13 2 g/dL      Hematocrit 40 7 %      MCV 95 fL      MCH 30 8 pg      MCHC 32 4 g/dL      RDW 16 1 %      MPV 11 8 fL      Platelets 765 Thousands/uL      nRBC 0 /100 WBCs      Neutrophils Relative 59 %      Immat GRANS % 0 %      Lymphocytes Relative 26 %      Monocytes Relative 12 %      Eosinophils Relative 2 %      Basophils Relative 1 %      Neutrophils Absolute 4 67 Thousands/µL      Immature Grans Absolute 0 02 Thousand/uL      Lymphocytes Absolute 2 03 Thousands/µL      Monocytes Absolute 0 92 Thousand/µL      Eosinophils Absolute 0 15 Thousand/µL      Basophils Absolute 0 05 Thousands/µL                  XR chest 1 view portable   ED Interpretation by Jordy Hart MD (06/13 2033)   Stable from prior, no acute cardiopulmonary abnormalities  Final Result by Saul Dick MD (06/14 5842)      No acute cardiopulmonary disease  Workstation performed: [id number]               Procedures  ECG 12 Lead Documentation Only    Date/Time: 6/13/2022 6:20 PM  Performed by: Oscar Lacey DO  Authorized by: Oscar Lacey DO     Indications / Diagnosis:  Chest pain  ECG reviewed by me, the ED Provider: yes    Patient location:  ED  Previous ECG:     Previous ECG:  Compared to current    Comparison ECG info:  New T-wave inversion in aVL  Less than 1 mm ST elevations in V1-V3    Similarity:  Changes noted  Interpretation:     Interpretation: abnormal    Rate:     ECG rate:  58    ECG rate assessment: bradycardic    Rhythm:     Rhythm: sinus bradycardia    Ectopy:     Ectopy: PVCs    QRS:     QRS axis:  Left    QRS intervals:  Normal  Conduction:     Conduction: abnormal      Abnormal conduction: complete RBBB and LAFB    ST segments:     ST segments:  Abnormal    Elevation:  V2 and V3  T waves:     T waves: inverted      Inverted:  AVL and V2  Comments:      Sinus bradycardia with 1 PVC  RBBB and LAFB unchanged from prior    Inverted T-waves in V2, V3, aVL also unchanged from prior  New ST segment changes in leads V2 and V3 with less than 1 mm elevation from baseline  ECG 12 Lead Documentation Only    Date/Time: 6/13/2022 7:10 PM  Performed by: Swathi Hess DO  Authorized by: Swathi Hess DO     Indications / Diagnosis:  Chest Pain   ECG reviewed by me, the ED Provider: yes    Patient location:  ED  Previous ECG:     Previous ECG:  Compared to current    Comparison ECG info:  PVC no longer noted, inverted T-wave in aVL no longer present  Similarity:  Changes noted  Interpretation:     Interpretation: abnormal    Rate:     ECG rate:  60    ECG rate assessment: normal    Rhythm:     Rhythm: sinus rhythm    Ectopy:     Ectopy: none    QRS:     QRS axis:  Left    QRS intervals:  Normal  Conduction:     Conduction: abnormal      Abnormal conduction: complete RBBB and LAFB    ST segments:     ST segments:  Abnormal    Elevation:  V2 and V3  T waves:     T waves: inverted      Inverted:  V2  Comments:      Normal sinus rhythm  RBBB and LAFB unchanged from prior  Inverted T-wave in V2 unchanged from prior, T-wave in aVL no longer inverted  Less than 1 mm elevation in ST segment of V2 and V3 still present but unchanged from prior  ED Course             HEART Risk Score    Flowsheet Row Most Recent Value   Heart Score Risk Calculator    History 1 Filed at: 06/13/2022 2121   ECG 1 Filed at: 06/13/2022 2121   Age 2 Filed at: 06/13/2022 2121   Risk Factors 2 Filed at: 06/13/2022 2121   Troponin 1 Filed at: 06/13/2022 2121   HEART Score 7 Filed at: 06/13/2022 2121                      SBIRT 20yo+    Flowsheet Row Most Recent Value   SBIRT (25 yo +)    In order to provide better care to our patients, we are screening all of our patients for alcohol and drug use  Would it be okay to ask you these screening questions?  Unable to answer at this time Filed at: 06/13/2022 1801        KATELYNN Risk Score    Flowsheet Row Most Recent Value Age >= 72 1 Filed at: 06/13/2022 2229   Known CAD (stenosis >= 50%) 0 Filed at: 06/13/2022 2229   Recent (<=24 hrs) Service Angina 0 Filed at: 06/13/2022 2229   ST Deviation >= 0 5 mm 0 Filed at: 06/13/2022 2229   3+ CAD Risk Factors (FHx, HTN, HLP, DM, Smoker) 1 Filed at: 06/13/2022 2229   Aspirin Use Past 7 Days 1 Filed at: 06/13/2022 2229   Elevated Cardiac Markers 0 Filed at: 06/13/2022 2229   KATELYNN Risk Score (Calculated) 3 Filed at: 06/13/2022 2229              MDM  Number of Diagnoses or Management Options  Chest pain with moderate risk for cardiac etiology  Diagnosis management comments: 76M presents to the emergency department with chest pain that started earlier in the evening  The patient has a significant cardiac history including 2 prior MIs with 4 stents placed  On physical exam the patient looks well but story is concerning including nausea, vomiting, chest pressure and improvement of pain with sublingual nitroglycerin  Laboratory evaluation in the emergency department includes CBC, CMP, HS troponin, BNP all of which are within normal limits other than mildly elevated BNP of 114 and HS troponin of 13  With elevated HS troponin a delta troponin is obtained which reveals a delta of 2  With subtle changes on ECG, concerning history, suspicious story of pain today the patient's heart score is 7 and the patient is appropriate for admission to the hospital for observation and cardiology follow-up    Patient's pain is resolved with nitroglycerin at this time and the patient is admitted the hospital        Disposition  Final diagnoses:   Chest pain with moderate risk for cardiac etiology     Time reflects when diagnosis was documented in both MDM as applicable and the Disposition within this note     Time User Action Codes Description Comment    6/13/2022 10:54 PM Caro Retort Add [R07 9] Chest pain, unspecified type     6/13/2022 10:55 PM Caro Retort Add [R07 9] Chest pain with moderate risk for cardiac etiology     6/13/2022 10:55 PM Josem Pay Modify [R07 9] Chest pain with moderate risk for cardiac etiology     6/13/2022 10:55 PM Josem Pay Remove [R07 9] Chest pain, unspecified type     6/13/2022 11:53 PM Tian Gregory E Add [R07 9] Chest pain, unspecified type     6/13/2022 11:53 PM Tian Felt E Add [I25 10,  Z98 61] CAD S/P percutaneous coronary angioplasty       ED Disposition     ED Disposition   Admit    Condition   Stable    Date/Time   Mon Jun 13, 2022 10:17 PM    Comment   Case was discussed with Yuriy Schofield and the patient's admission status was agreed to be Admission Status: observation status to the service of Dr Shazia Marrufo   Follow-up Information    None         Current Discharge Medication List      CONTINUE these medications which have NOT CHANGED    Details   allopurinol (ZYLOPRIM) 300 mg tablet Take 300 mg by mouth daily      aspirin 81 mg chewable tablet Chew 81 mg daily      atorvastatin (LIPITOR) 40 mg tablet Take 40 mg by mouth daily      chlorhexidine (PERIDEX) 0 12 % solution       cholecalciferol (VITAMIN D3) 1,000 units tablet Take 1,000 Units by mouth daily      Diclofenac Sodium (VOLTAREN) 1 % Apply 1 application topically 4 (four) times a day      famotidine (PEPCID) 20 mg tablet TAKE 1 TABLET BY MOUTH  TWICE DAILY  Qty: 180 tablet, Refills: 3    Comments: Requesting 1 year supply  Associated Diagnoses: Gastroesophageal reflux disease without esophagitis      metoprolol succinate (TOPROL-XL) 25 mg 24 hr tablet       metoprolol tartrate (LOPRESSOR) 25 mg tablet Take 25 mg by mouth daily at bedtime       nitroglycerin (NITROSTAT) 0 4 mg SL tablet Place 1 tablet under the tongue every 5 (five) minutes as needed      ramipril (ALTACE) 5 mg capsule Take 5 mg by mouth 2 (two) times a day        senna-docusate sodium (SENOKOT S) 8 6-50 mg per tablet Take 1 tablet by mouth 2 (two) times a day           No discharge procedures on file      PDMP Review     None           ED Provider  Attending physically available and evaluated Lauren Button  I managed the patient along with the ED Attending      Electronically Signed by         Nya Benton DO  06/14/22 2036

## 2022-06-13 NOTE — ED ATTENDING ATTESTATION
6/13/2022  IBeryl MD, saw and evaluated the patient  I have discussed the patient with the resident/non-physician practitioner and agree with the resident's/non-physician practitioner's findings, Plan of Care, and MDM as documented in the resident's/non-physician practitioner's note, except where noted  All available labs and Radiology studies were reviewed  I was present for key portions of any procedure(s) performed by the resident/non-physician practitioner and I was immediately available to provide assistance  At this point I agree with the current assessment done in the Emergency Department  I have conducted an independent evaluation of this patient a history and physical is as follows:    27-year-old male with history of CAD with 4 stents in place, presenting for evaluation of epigastric and substernal chest pressure with nausea and 2 episodes of vomiting  No radiation of the pain to the back, jaw, or arms  Accompanied by shortness of breath with lying flat  He has bilateral lower extremity edema, worse on the left, which is chronic and at baseline  Symptoms did not improve with Tums prompting him to seek care in the emergency department  ED Course  ED Course as of 06/14/22 0247   Mon Jun 13, 2022   [id number] I personally interpreted the patient's EKG which reveals sinus bradycardia 58 beats per minute, right bundle-branch block unchanged from prior, left anterior fascicular block unchanged from prior, left axis deviation unchanged from prior, new T-wave inversion in lead aVL, slightly concave ST segment with minimal less than 0 5 mm of ST-elevation in lead V2, T-wave inversion in leads V2 and V3 similar to prior  2031 Initial troponin 13  Sublingual nitroglycerin given  Patient denies any residual pain  Chest pain resolved following nitroglycerin  High risk heart score of 7  Will admit to slim for further management      Critical Care Time  Procedures

## 2022-06-14 ENCOUNTER — APPOINTMENT (OUTPATIENT)
Dept: NON INVASIVE DIAGNOSTICS | Facility: HOSPITAL | Age: 77
End: 2022-06-14
Payer: COMMERCIAL

## 2022-06-14 VITALS
BODY MASS INDEX: 28.88 KG/M2 | TEMPERATURE: 98.4 F | SYSTOLIC BLOOD PRESSURE: 113 MMHG | RESPIRATION RATE: 16 BRPM | WEIGHT: 195 LBS | OXYGEN SATURATION: 99 % | DIASTOLIC BLOOD PRESSURE: 67 MMHG | HEIGHT: 69 IN | HEART RATE: 55 BPM

## 2022-06-14 LAB
AORTIC ROOT: 2.5 CM
APICAL FOUR CHAMBER EJECTION FRACTION: 53 %
ATRIAL RATE: 58 BPM
ATRIAL RATE: 60 BPM
CHOLEST SERPL-MCNC: 121 MG/DL
E WAVE DECELERATION TIME: 242 MS
FRACTIONAL SHORTENING: 28 (ref 28–44)
HDLC SERPL-MCNC: 41 MG/DL
INTERVENTRICULAR SEPTUM IN DIASTOLE (PARASTERNAL SHORT AXIS VIEW): 1 CM
INTERVENTRICULAR SEPTUM: 1 CM (ref 0.6–1.1)
LAAS-AP2: 25 CM2
LAAS-AP4: 24.9 CM2
LDLC SERPL CALC-MCNC: 63 MG/DL (ref 0–100)
LEFT ATRIUM SIZE: 3.3 CM
LEFT INTERNAL DIMENSION IN SYSTOLE: 3.4 CM (ref 2.1–4)
LEFT VENTRICULAR INTERNAL DIMENSION IN DIASTOLE: 4.7 CM (ref 3.5–6)
LEFT VENTRICULAR POSTERIOR WALL IN END DIASTOLE: 1.2 CM
LEFT VENTRICULAR STROKE VOLUME: 55 ML
LVSV (TEICH): 55 ML
MV E'TISSUE VEL-SEP: 8 CM/S
MV PEAK A VEL: 1.07 M/S
MV PEAK E VEL: 85 CM/S
MV STENOSIS PRESSURE HALF TIME: 70 MS
MV VALVE AREA P 1/2 METHOD: 3.14
NONHDLC SERPL-MCNC: 80 MG/DL
P AXIS: 40 DEGREES
P AXIS: 43 DEGREES
PR INTERVAL: 186 MS
PR INTERVAL: 192 MS
QRS AXIS: -49 DEGREES
QRS AXIS: -54 DEGREES
QRSD INTERVAL: 130 MS
QRSD INTERVAL: 136 MS
QT INTERVAL: 436 MS
QT INTERVAL: 442 MS
QTC INTERVAL: 433 MS
QTC INTERVAL: 436 MS
RIGHT ATRIUM AREA SYSTOLE A4C: 18.1 CM2
RIGHT VENTRICLE ID DIMENSION: 3.5 CM
SL CV LEFT ATRIUM LENGTH A2C: 6.6 CM
SL CV LV EF: 55
SL CV PED ECHO LEFT VENTRICLE DIASTOLIC VOLUME (MOD BIPLANE) 2D: 103 ML
SL CV PED ECHO LEFT VENTRICLE SYSTOLIC VOLUME (MOD BIPLANE) 2D: 48 ML
T WAVE AXIS: 43 DEGREES
T WAVE AXIS: 61 DEGREES
TRIGL SERPL-MCNC: 83 MG/DL
VENTRICULAR RATE: 58 BPM
VENTRICULAR RATE: 60 BPM

## 2022-06-14 PROCEDURE — 80061 LIPID PANEL: CPT | Performed by: PHYSICIAN ASSISTANT

## 2022-06-14 PROCEDURE — 99217 PR OBSERVATION CARE DISCHARGE MANAGEMENT: CPT | Performed by: INTERNAL MEDICINE

## 2022-06-14 PROCEDURE — 93306 TTE W/DOPPLER COMPLETE: CPT | Performed by: INTERNAL MEDICINE

## 2022-06-14 PROCEDURE — C8929 TTE W OR WO FOL WCON,DOPPLER: HCPCS

## 2022-06-14 PROCEDURE — 99205 OFFICE O/P NEW HI 60 MIN: CPT | Performed by: INTERNAL MEDICINE

## 2022-06-14 PROCEDURE — 93010 ELECTROCARDIOGRAM REPORT: CPT | Performed by: INTERNAL MEDICINE

## 2022-06-14 RX ORDER — METOPROLOL SUCCINATE 25 MG/1
12.5 TABLET, EXTENDED RELEASE ORAL DAILY
Status: DISCONTINUED | OUTPATIENT
Start: 2022-06-15 | End: 2022-06-14 | Stop reason: HOSPADM

## 2022-06-14 RX ORDER — ACETAMINOPHEN 325 MG/1
650 TABLET ORAL EVERY 6 HOURS PRN
Status: DISCONTINUED | OUTPATIENT
Start: 2022-06-14 | End: 2022-06-14 | Stop reason: HOSPADM

## 2022-06-14 RX ORDER — ASPIRIN 81 MG/1
81 TABLET, CHEWABLE ORAL DAILY
Status: DISCONTINUED | OUTPATIENT
Start: 2022-06-14 | End: 2022-06-14 | Stop reason: HOSPADM

## 2022-06-14 RX ORDER — METOPROLOL SUCCINATE 25 MG/1
12.5 TABLET, EXTENDED RELEASE ORAL DAILY
Qty: 15 TABLET | Refills: 0 | Status: SHIPPED | OUTPATIENT
Start: 2022-06-15

## 2022-06-14 RX ORDER — MELATONIN
1000 DAILY
Status: DISCONTINUED | OUTPATIENT
Start: 2022-06-14 | End: 2022-06-14 | Stop reason: HOSPADM

## 2022-06-14 RX ORDER — ALLOPURINOL 300 MG/1
300 TABLET ORAL DAILY
Status: DISCONTINUED | OUTPATIENT
Start: 2022-06-14 | End: 2022-06-14 | Stop reason: HOSPADM

## 2022-06-14 RX ORDER — LISINOPRIL 20 MG/1
20 TABLET ORAL DAILY
Status: DISCONTINUED | OUTPATIENT
Start: 2022-06-14 | End: 2022-06-14

## 2022-06-14 RX ORDER — LISINOPRIL 20 MG/1
40 TABLET ORAL DAILY
Status: DISCONTINUED | OUTPATIENT
Start: 2022-06-15 | End: 2022-06-14 | Stop reason: HOSPADM

## 2022-06-14 RX ORDER — ATORVASTATIN CALCIUM 40 MG/1
40 TABLET, FILM COATED ORAL DAILY
Status: DISCONTINUED | OUTPATIENT
Start: 2022-06-14 | End: 2022-06-14 | Stop reason: HOSPADM

## 2022-06-14 RX ORDER — ENOXAPARIN SODIUM 100 MG/ML
40 INJECTION SUBCUTANEOUS DAILY
Status: DISCONTINUED | OUTPATIENT
Start: 2022-06-14 | End: 2022-06-14 | Stop reason: HOSPADM

## 2022-06-14 RX ORDER — LISINOPRIL 20 MG/1
20 TABLET ORAL ONCE
Status: COMPLETED | OUTPATIENT
Start: 2022-06-14 | End: 2022-06-14

## 2022-06-14 RX ORDER — METOPROLOL SUCCINATE 25 MG/1
25 TABLET, EXTENDED RELEASE ORAL DAILY
Status: DISCONTINUED | OUTPATIENT
Start: 2022-06-14 | End: 2022-06-14

## 2022-06-14 RX ORDER — FAMOTIDINE 20 MG/1
20 TABLET, FILM COATED ORAL 2 TIMES DAILY
Status: DISCONTINUED | OUTPATIENT
Start: 2022-06-14 | End: 2022-06-14 | Stop reason: HOSPADM

## 2022-06-14 RX ADMIN — ALLOPURINOL 300 MG: 300 TABLET ORAL at 08:22

## 2022-06-14 RX ADMIN — LISINOPRIL 20 MG: 20 TABLET ORAL at 11:34

## 2022-06-14 RX ADMIN — PERFLUTREN 0.6 ML/MIN: 6.52 INJECTION, SUSPENSION INTRAVENOUS at 12:40

## 2022-06-14 RX ADMIN — ATORVASTATIN CALCIUM 40 MG: 40 TABLET, FILM COATED ORAL at 08:22

## 2022-06-14 RX ADMIN — FAMOTIDINE 20 MG: 20 TABLET ORAL at 08:27

## 2022-06-14 RX ADMIN — METOPROLOL SUCCINATE 25 MG: 25 TABLET, EXTENDED RELEASE ORAL at 08:22

## 2022-06-14 RX ADMIN — Medication 1000 UNITS: at 08:22

## 2022-06-14 RX ADMIN — ASPIRIN 81 MG CHEWABLE TABLET 81 MG: 81 TABLET CHEWABLE at 08:22

## 2022-06-14 RX ADMIN — FAMOTIDINE 20 MG: 20 TABLET ORAL at 01:00

## 2022-06-14 RX ADMIN — FAMOTIDINE 20 MG: 20 TABLET ORAL at 16:17

## 2022-06-14 RX ADMIN — ENOXAPARIN SODIUM 40 MG: 40 INJECTION SUBCUTANEOUS at 08:22

## 2022-06-14 RX ADMIN — LISINOPRIL 20 MG: 20 TABLET ORAL at 08:22

## 2022-06-14 NOTE — ASSESSMENT & PLAN NOTE
· Chronic with HR in the 50-60s since presentation  · On metoprolol  · Monitor on telemetry given chest pain

## 2022-06-14 NOTE — CONSULTS
Consultation - Cardiology Team 1  Osito Cueva 77 y o  male MRN: [id number]  Unit/Bed#: S -01 Encounter: [id number]  06/14/22  10:29 AM    Assessment/ Plan:  1  Epigastric pain  - presenting with epigastric pressure/heaviness and nausea a few hours prior to arrival  - hs troponin 13 > 15 > 16; flat  - presenting EKG - sinus bradycardia, HR 58, RBBB/LAFB (known), no signs of acute ischemia  - telemetry review - sinus bradycardia, occasional PVCs, HR 50s  - echo ordered and pending to assess LVEF and any wall motion/valvular abnormalities - no prior studies to compare  - CXR - no acute cardiopulmonary disease  - unless echocardiogram grossly abnormal, will obtain outpatient nuclear stress test for ischemic evaluation  - continue to monitor on telemetry    2  CAD s/p PCI  - patient reports 4 stents s/p MI (2003, 2013)  - no recent ischemic evaluation  - continue home aspirin 81 mg p o  Daily, atorvastatin 40 mg p o  Daily,   - will decrease home Toprol XL 25 mg p o  Daily to 12 5 mg daily given bradycardia    3  Bradycardia  - known history  - remains asymptomatic  - no evidence of high-degree block on EKG/telemetry    4  Hypertension  - last documented blood pressure 160/82  - home antihypertensive regimen - ramipril 5 mg p o  Daily, Toprol XL  - increase lisinopril to 40 mg p o  Daily, continue Toprol XL    History of Present Illness   Physician Requesting Consult: Brad Hargrove MD    Reason for Consult / Principal Problem:  Chest pain    HPI: Osito Cueva is a 77y o  year old male with past medical history of hypertension, bradycardia, CAD s/p PCI, polio, and GERD who presents to the ED late last evening with complaints of epigastric pain prior to eating dinner yesterday  Patient reports that the pain began in the pit of his stomach without radiation to his chest, back, arms, or jaw  Patient reports nausea as well    Upon arrival to the ED, initial troponin was unremarkable at 13 and remained flat  Presenting EKG demonstrated sinus bradycardia without signs of acute ischemia  Patient was given sublingual nitro x1 with complete resolution of his symptoms and Cardiology was consulted to rule out ACS  Upon examination, patient notes that he did not experience any chest pain yesterday prompting him to come to the ED  Notes that he had a heated argument with his son around 4:30 p m  and began having mid epigastric pain  He proceeded to eat dinner and experienced subsequent nausea  He  attempted to vomit, but did not  Patient took 2 Tums at home without resolution in his symptoms which prompted him to present to the ED  He denies any other associated symptoms of shortness of breath, diaphoresis, lightheadedness, dizziness, or palpitations  Patient admits to complaining of chest pain on arrival in order to be seen quicker, but was not actually experiencing any chest pain  Patient states that this is the first time he has experienced this pain  It does not resemble symptoms he experienced previously during his MIs in 2003 and 2013  Notes he experienced diaphoresis and shortness of breath at those times  Patient also notes that he has been having a cough over the past week  Was prescribed azithromycin per his PCP, but is still experiencing a cough  Tested negative for COVID via home test x2  Denies any fever, chills, or sputum production at home  Notes that he is recovering from a hip replacement in February, for which he has been using assistive devices at home but is slowly been weaning himself off of a cane  Patient is compliant with his medications at home  Lives with his wife and is independent at baseline      Inpatient consult to Cardiology  Consult performed by: SHARON Frederick  Consult ordered by: Jovanna Scott PA-C      EKG: sinus bradycardia, HR 58, RBBB/LAFB (known), no signs of acute ischemia    Review of Systems   Constitutional: Negative for chills, diaphoresis, fatigue and fever    HENT: Negative  Eyes: Negative  Respiratory: Positive for cough  Negative for chest tightness and shortness of breath  Cardiovascular: Negative for chest pain  Gastrointestinal: Positive for abdominal pain and nausea  Negative for diarrhea and vomiting  Mid epigastric   Endocrine: Negative  Genitourinary: Positive for frequency  OAB   Musculoskeletal: Negative  Skin: Negative  Allergic/Immunologic: Negative  Neurological: Negative for dizziness, syncope, weakness and light-headedness  Hematological: Negative  Psychiatric/Behavioral: Negative  Historical Information   Past Medical History:   Diagnosis Date    Cardiac disease     Colon polyp     Coronary artery disease     Hypercholesteremia     Hypertension     Kidney stone     Myocardial infarction ([facility] [address] )     5/04/2003: 2/8/2013     Past Surgical History:   Procedure Laterality Date    CARDIAC SURGERY      patient reports having 4 stents placed    COLONOSCOPY  07/2015    Due 7/2020    COLONOSCOPY      HERNIA REPAIR      LITHOTRIPSY      x4    OTHER SURGICAL HISTORY      stents    TONSILLECTOMY      UPPER GASTROINTESTINAL ENDOSCOPY       Social History     Substance and Sexual Activity   Alcohol Use Not Currently     Social History     Substance and Sexual Activity   Drug Use Never     Social History     Tobacco Use   Smoking Status Former Smoker    Years: 0 00   Smokeless Tobacco Never Used   Tobacco Comment    smoked in high school     Family History:   Family History   Problem Relation Age of Onset    Heart disease Father     Diabetes Paternal Uncle      Meds/Allergies   all current active meds have been reviewed  No Known Allergies    Objective   Vitals: Blood pressure 160/82, pulse (!) 48, temperature 97 5 °F (36 4 °C), temperature source Oral, resp  rate 18, weight 88 9 kg (195 lb 15 8 oz), SpO2 99 %  , Body mass index is 28 94 kg/m² ,   Orthostatic Blood Pressures    Flowsheet Row Most Recent Value   Blood Pressure 160/82 filed at 06/14/2022 0700   Patient Position - Orthostatic VS Sitting filed at 06/14/2022 4116        Systolic (90AXI), XHK:247 , Min:121 , SHN:306     Diastolic (54CGX), DGD:69, Min:63, Max:89    No intake or output data in the 24 hours ending 06/14/22 1029    Invasive Devices  Report    Peripheral Intravenous Line  Duration           Peripheral IV 06/13/22 Left Antecubital <1 day                Physical Exam  Constitutional:       General: He is not in acute distress  Appearance: Normal appearance  He is not ill-appearing  HENT:      Head: Normocephalic and atraumatic  Nose: Nose normal       Mouth/Throat:      Mouth: Mucous membranes are moist       Pharynx: Oropharynx is clear  Eyes:      Pupils: Pupils are equal, round, and reactive to light  Cardiovascular:      Rate and Rhythm: Regular rhythm  Bradycardia present  Pulses: Normal pulses  Heart sounds: Normal heart sounds  No murmur heard  No friction rub  No gallop  Pulmonary:      Effort: Pulmonary effort is normal       Breath sounds: Normal breath sounds  No wheezing, rhonchi or rales  Abdominal:      General: Bowel sounds are normal       Palpations: Abdomen is soft  Musculoskeletal:         General: Normal range of motion  Cervical back: Normal range of motion  Right lower leg: No edema  Left lower leg: Edema present  Comments: Chronic left lower extremity edema (+2)/muscle atrophy from polio infection as a child   Skin:     General: Skin is warm and dry  Capillary Refill: Capillary refill takes less than 2 seconds  Neurological:      General: No focal deficit present  Mental Status: He is alert and oriented to person, place, and time     Psychiatric:         Mood and Affect: Mood normal          Behavior: Behavior normal      Lab Results:   Troponins:     CBC with diff:   Results from last 7 days   Lab Units 06/13/22  1843   WBC Thousand/uL 7 84 HEMOGLOBIN g/dL 13 2   HEMATOCRIT % 40 7   MCV fL 95   PLATELETS Thousands/uL 191   MCH pg 30 8   MCHC g/dL 32 4   RDW % 16 1*   MPV fL 11 8   NRBC AUTO /100 WBCs 0     CMP:   Results from last 7 days   Lab Units 06/13/22  1843   POTASSIUM mmol/L 4 4   CHLORIDE mmol/L 106   CO2 mmol/L 28   BUN mg/dL 14   CREATININE mg/dL 0 96   CALCIUM mg/dL 9 1   AST U/L 26   ALT U/L 22   ALK PHOS U/L 178*   EGFR ml/min/1 73sq m 77

## 2022-06-14 NOTE — ASSESSMENT & PLAN NOTE
· Presented with chest pain  Pain currently resolved  · R/o ACS; h/o CAD s/p multiple stents  · KATELYNN score: 3  · EKG T-wave inversion in aVL  · Troponin: 13, 15  · CXR with no acute findings noted  · Continue statin, aspirin and BB  · Consult cardiology - echo relatively unchanged and will see his primary cardiologist in one week and a stress test was ordered    Betablocker was decreased from 25mg to 12 5mg

## 2022-06-14 NOTE — ASSESSMENT & PLAN NOTE
· Chronic with HR in the 50-60s since presentation  · On metoprolol  Half dose from PTA  · Monitor on telemetry given chest pain

## 2022-06-14 NOTE — DISCHARGE SUMMARY
[facility]  Discharge- Frederic Moralez 1945, 77 y o  male MRN: [id number]  Unit/Bed#: S -01 Encounter: [id number]  Primary Care Provider: Real Pierce MD   Date and time admitted to hospital: 6/13/2022  5:56 PM    * Chest pain  Assessment & Plan  · Presented with chest pain  Pain currently resolved  · R/o ACS; h/o CAD s/p multiple stents  · KATELYNN score: 3  · EKG T-wave inversion in aVL  · Troponin: 13, 15  · CXR with no acute findings noted  · Continue statin, aspirin and BB  · Consult cardiology - echo relatively unchanged and will see his primary cardiologist in one week and a stress test was ordered  Betablocker was decreased from 25mg to 12 5mg    Gastroesophageal reflux disease  Assessment & Plan  · Continue Pepcid  CAD S/P percutaneous coronary angioplasty  Assessment & Plan  · History of MI in 2003 and 2013 s/p stent x 4  Continue statin, aspirin and BB  Bradycardia  Assessment & Plan  · Chronic with HR in the 50-60s since presentation  · On metoprolol  Half dose from PTA  · Monitor on telemetry given chest pain  Medical Problems             Resolved Problems  Date Reviewed: 6/13/2022   None               Discharging Physician / Practitioner: Ahmet Villarreal MD  PCP: Real Pierce MD  Admission Date:   Admission Orders (From admission, onward)     Ordered        06/13/22 2219  Place in Observation  Once                      Discharge Date: 06/14/22    Consultations During Hospital Stay:  · Cardiology- who recommended outpatient stress test and half dose betablocker (12 5mg)    Procedures Performed:   · Echo    Significant Findings / Test Results:     Left Ventricle: Left ventricular cavity size is normal  Wall thickness is normal  The left ventricular ejection fraction is 55%  Systolic function is normal  Diastolic function is mildly abnormal, consistent with grade I (abnormal) relaxation      The following segments are hypokinetic: apical anterior, apical septal and apex    All other segments are normal     Left Atrium: The atrium is mildly dilated    Mitral Valve: There is mild regurgitation    Frequent PVC's are noted    Wall motion abnormality matches area of infarct noted on prior stress nuclear study from 10/29/20  ·     Incidental Findings:   · As above    Test Results Pending at Discharge (will require follow up):   · none     Outpatient Tests Requested:  · Stress test    Complications:  none    Reason for Admission: chest pain    Hospital Course:   Edelmira Segal is a 77 y o  male patient who originally presented to the hospital on 6/13/2022 due to chest  Pain  See above for details  Please see above list of diagnoses and related plan for additional information  Condition at Discharge: fair    Discharge Day Visit / Exam:   Subjective:  "I feel good, can I go home" his pain has resolved and he is ambulating without sob/car or cp  He is happy to see his primary cardiologist in one week  Vitals: Blood Pressure: 113/67 (06/14/22 1507)  Pulse: 55 (06/14/22 1507)  Temperature: 98 4 °F (36 9 °C) (06/14/22 1507)  Temp Source: Oral (06/14/22 1507)  Respirations: 16 (06/14/22 1507)  Height: 5' 9" (175 3 cm) (06/14/22 1252)  Weight - Scale: 88 5 kg (195 lb) (06/14/22 1252)  SpO2: 99 % (06/14/22 1507)  Exam:   Physical Exam  Vitals and nursing note reviewed  Constitutional:       Appearance: Normal appearance  He is not ill-appearing or diaphoretic  HENT:      Head: Normocephalic  Nose: Nose normal  No congestion  Mouth/Throat:      Mouth: Mucous membranes are moist       Pharynx: No oropharyngeal exudate  Eyes:      General: No scleral icterus  Conjunctiva/sclera: Conjunctivae normal    Cardiovascular:      Rate and Rhythm: Regular rhythm  Bradycardia present  Pulmonary:      Effort: Pulmonary effort is normal  No respiratory distress  Breath sounds: No wheezing or rales     Abdominal:      General: Bowel sounds are normal  There is no distension  Palpations: Abdomen is soft  Tenderness: There is no abdominal tenderness  Musculoskeletal:      Cervical back: Neck supple  No rigidity  Right lower leg: No edema  Left lower leg: No edema  Neurological:      Mental Status: He is alert and oriented to person, place, and time  Psychiatric:         Mood and Affect: Mood normal          Behavior: Behavior normal           Discussion with Family: Patient declined call to   Discharge instructions/Information to patient and family:   See after visit summary for information provided to patient and family  Provisions for Follow-Up Care:  See after visit summary for information related to follow-up care and any pertinent home health orders  Disposition:   Home    Planned Readmission: no     Discharge Statement:  I spent 25 minutes discharging the patient  This time was spent on the day of discharge  I had direct contact with the patient on the day of discharge  Greater than 50% of the total time was spent examining patient, answering all patient questions, arranging and discussing plan of care with patient as well as directly providing post-discharge instructions  Additional time then spent on discharge activities  Discharge Medications:  See after visit summary for reconciled discharge medications provided to patient and/or family

## 2022-06-14 NOTE — PLAN OF CARE
Problem: Potential for Falls  Goal: Patient will remain free of falls  Description: INTERVENTIONS:  - Educate patient/family on patient safety including physical limitations  - Instruct patient to call for assistance with activity   - Consult OT/PT to assist with strengthening/mobility   - Keep Call bell within reach  - Keep bed low and locked with side rails adjusted as appropriate  - Keep care items and personal belongings within reach  - Initiate and maintain comfort rounds  - Make Fall Risk Sign visible to staff  - Offer Toileting every 2 Hours, in advance of need  - Initiate/Maintain bed alarm  - Obtain necessary fall risk management equipment: bed alarm  - Apply yellow socks and bracelet for high fall risk patients  - Consider moving patient to room near nurses station  Outcome: Adequate for Discharge     Problem: Prexisting or High Potential for Compromised Skin Integrity  Goal: Skin integrity is maintained or improved  Description: INTERVENTIONS:  - Identify patients at risk for skin breakdown  - Assess and monitor skin integrity  - Assess and monitor nutrition and hydration status  - Monitor labs   - Assess for incontinence   - Turn and reposition patient  - Assist with mobility/ambulation  - Relieve pressure over bony prominences  - Avoid friction and shearing  - Provide appropriate hygiene as needed including keeping skin clean and dry  - Evaluate need for skin moisturizer/barrier cream  - Collaborate with interdisciplinary team   - Patient/family teaching  - Consider wound care consult   Outcome: Adequate for Discharge

## 2022-06-14 NOTE — H&P
[facility]  H&P- Sukumar Palm 1945, 77 y o  male MRN: [id number]  Unit/Bed#: ED 12 Encounter: [id number]  Primary Care Provider: Prema Severino MD   Date and time admitted to hospital: 6/13/2022  5:56 PM    * Chest pain  Assessment & Plan  · Presented with chest pain  Pain currently resolved  · R/o ACS; h/o CAD s/p multiple stents  · KATELYNN score: 3  · EKG T-wave inversion in aVL  · Troponin: 13, 15  · CXR with no acute findings noted  · Continue to trend troponin  · Monitor on telemetry  · Obtain lipid panel  · Continue statin, aspirin and BB  · Consult cardiology  CAD S/P percutaneous coronary angioplasty  Assessment & Plan  · History of MI in 2003 and 2013 s/p stent x 4  Continue statin, aspirin and BB  Bradycardia  Assessment & Plan  · Chronic with HR in the 50-60s since presentation  · On metoprolol  · Monitor on telemetry given chest pain  Essential hypertension  Assessment & Plan  · Continue metoprolol  · Substitute lisinopril from ramipril while inpatient  Gastroesophageal reflux disease  Assessment & Plan  · Continue Pepcid  VTE Pharmacologic Prophylaxis: VTE Score: 5 High Risk (Score >/= 5) - Pharmacological DVT Prophylaxis Ordered: enoxaparin (Lovenox)  Sequential Compression Devices Ordered  Code Status: level 1 - full code  Discussion with family: not at this time  Anticipated Length of Stay: Patient will be admitted on an observation basis with an anticipated length of stay of less than 2 midnights secondary to chest pain  Total Time for Visit, including Counseling / Coordination of Care: 70 minutes Greater than 50% of this total time spent on direct patient counseling and coordination of care  Chief Complaint: chest pain    History of Present Illness:  Sukumar Palm is a 77 y o  male with a PMH of CAD s/p stenting, HTN, HLD, bradycardia and GERD who presents with chest pain   Patient reports that prior to eating dinner tonight he began having a pain in the "pit" of his stomach which radiated up into his chest  He describes the pain as feeling as though "someone is pressing on the area" and notes that he has never had pain like this in the past  He states that he ate dinner but after dinner his pain worsened and he became nauseous as well  He denies SOB, vomiting, diaphoresis or dizziness  He reports that 1 week ago he had a cough and tested negative for COVID twice with rapid home tests  He completed a course of azithromyin for his cough and has since noticed improvement  Review of Systems:  Review of Systems   Constitutional: Negative for chills, diaphoresis and fever  Respiratory: Negative for cough and shortness of breath  Cardiovascular: Positive for chest pain and leg swelling (chronic)  Gastrointestinal: Positive for nausea  Negative for abdominal pain, diarrhea and vomiting  Neurological: Negative for dizziness and light-headedness  All other systems reviewed and are negative  Past Medical and Surgical History:   Past Medical History:   Diagnosis Date    Cardiac disease     Colon polyp     Coronary artery disease     Hypercholesteremia     Hypertension     Kidney stone     Myocardial infarction ([facility] [address] )     5/04/2003: 2/8/2013       Past Surgical History:   Procedure Laterality Date    CARDIAC SURGERY      patient reports having 4 stents placed    COLONOSCOPY  07/2015    Due 7/2020    COLONOSCOPY      HERNIA REPAIR      LITHOTRIPSY      x4    OTHER SURGICAL HISTORY      stents    TONSILLECTOMY      UPPER GASTROINTESTINAL ENDOSCOPY         Meds/Allergies:  Prior to Admission medications    Medication Sig Start Date End Date Taking?  Authorizing Provider   allopurinol (ZYLOPRIM) 300 mg tablet Take 300 mg by mouth daily    Historical Provider, MD   aspirin 81 mg chewable tablet Chew 81 mg daily    Historical Provider, MD   atorvastatin (LIPITOR) 40 mg tablet Take 40 mg by mouth daily    Historical Provider, MD   chlorhexidine (PERIDEX) 0 12 % solution  11/15/21   Historical Provider, MD   cholecalciferol (VITAMIN D3) 1,000 units tablet Take 1,000 Units by mouth daily    Historical Provider, MD   Diclofenac Sodium (VOLTAREN) 1 % Apply 1 application topically 4 (four) times a day 12/3/21 12/3/22  Historical Provider, MD   famotidine (PEPCID) 20 mg tablet TAKE 1 TABLET BY MOUTH  TWICE DAILY 12/13/21   Lainey Anderson MD   metoprolol succinate (TOPROL-XL) 25 mg 24 hr tablet  10/7/21   Historical Provider, MD   metoprolol tartrate (LOPRESSOR) 25 mg tablet Take 25 mg by mouth daily at bedtime   Patient not taking: Reported on 12/17/2021     Historical Provider, MD   nitroglycerin (NITROSTAT) 0 4 mg SL tablet Place 1 tablet under the tongue every 5 (five) minutes as needed 7/1/20   Historical Provider, MD   ramipril (ALTACE) 5 mg capsule Take 5 mg by mouth 2 (two) times a day      Historical Provider, MD   senna-docusate sodium (SENOKOT S) 8 6-50 mg per tablet Take 1 tablet by mouth 2 (two) times a day 2/18/22 2/18/23  Historical Provider, MD     I have reviewed home medications with a medical source (PCP, Pharmacy, other)      Allergies: No Known Allergies    Social History:  Marital Status: /Civil Union   Occupation:   Patient Pre-hospital Living Situation: Home, With spouse  Patient Pre-hospital Level of Mobility: walks  Patient Pre-hospital Diet Restrictions:   Substance Use History:   Social History     Substance and Sexual Activity   Alcohol Use Not Currently     Social History     Tobacco Use   Smoking Status Former Smoker    Years: 0 00   Smokeless Tobacco Never Used   Tobacco Comment    smoked in high school     Social History     Substance and Sexual Activity   Drug Use Never       Family History:  Family History   Problem Relation Age of Onset    Heart disease Father     Diabetes Paternal Uncle        Physical Exam:     Vitals:   Blood Pressure: 156/72 (06/13/22 2200)  Pulse: (!) 54 (06/13/22 2200)  Temperature: 98 7 °F (37 1 °C) (06/13/22 1758)  Temp Source: Oral (06/13/22 1758)  Respirations: 18 (06/13/22 2200)  SpO2: 98 % (06/13/22 2200)    Physical Exam  Vitals and nursing note reviewed  Constitutional:       General: He is not in acute distress  Appearance: He is not diaphoretic  HENT:      Head: Normocephalic and atraumatic  Eyes:      Conjunctiva/sclera: Conjunctivae normal    Cardiovascular:      Rate and Rhythm: Regular rhythm  Bradycardia present  Pulmonary:      Effort: Pulmonary effort is normal  No respiratory distress  Breath sounds: Normal breath sounds  Chest:      Chest wall: No tenderness  Abdominal:      General: Bowel sounds are normal       Palpations: Abdomen is soft  Tenderness: There is no abdominal tenderness  Musculoskeletal:      Right lower leg: Edema (2+ pitting edema) present  Left lower leg: Edema (2+ pitting edema) present  Comments: Per patient lower extremity edema is chronic, unchanged  Skin:     General: Skin is warm and dry  Coloration: Skin is not pale  Neurological:      Mental Status: He is alert and oriented to person, place, and time     Psychiatric:         Mood and Affect: Mood normal          Additional Data:     Lab Results:  Results from last 7 days   Lab Units 06/13/22  1843   WBC Thousand/uL 7 84   HEMOGLOBIN g/dL 13 2   HEMATOCRIT % 40 7   PLATELETS Thousands/uL 191   NEUTROS PCT % 59   LYMPHS PCT % 26   MONOS PCT % 12   EOS PCT % 2     Results from last 7 days   Lab Units 06/13/22  1843   SODIUM mmol/L 142   POTASSIUM mmol/L 4 4   CHLORIDE mmol/L 106   CO2 mmol/L 28   BUN mg/dL 14   CREATININE mg/dL 0 96   ANION GAP mmol/L 8   CALCIUM mg/dL 9 1   ALBUMIN g/dL 3 8   TOTAL BILIRUBIN mg/dL 0 62   ALK PHOS U/L 178*   ALT U/L 22   AST U/L 26   GLUCOSE RANDOM mg/dL 102                       Imaging: Personally reviewed the following imaging: chest xray  XR chest 1 view portable   ED Interpretation by Xochilt Brennan Oumou Bales MD (06/13 2033)   Stable from prior, no acute cardiopulmonary abnormalities  EKG and Other Studies Reviewed on Admission:   · EKG: Sinus Bradycardia  HR 58  T-wave inversion aVL, complete RBBB, LAFB  ** Please Note: This note has been constructed using a voice recognition system   **

## 2022-06-14 NOTE — ASSESSMENT & PLAN NOTE
· Presented with chest pain  Pain currently resolved  · R/o ACS; h/o CAD s/p multiple stents  · KATELYNN score: 3  · EKG T-wave inversion in aVL  · Troponin: 13, 15  · CXR with no acute findings noted  · Continue to trend troponin  · Monitor on telemetry  · Obtain lipid panel  · Continue statin, aspirin and BB  · Consult cardiology

## 2022-06-14 NOTE — UTILIZATION REVIEW
Initial Clinical Review    Admission: Date/Time/Statement:   Admission Orders (From admission, onward)     Ordered        06/13/22 5451  Place in Observation  Once                      Orders Placed This Encounter   Procedures    Place in Observation     Standing Status:   Standing     Number of Occurrences:   1     Order Specific Question:   Level of Care     Answer:   Med Surg [16]     ED Arrival Information     Expected   -    Arrival   6/13/2022 17:50    Acuity   Emergent            Means of arrival   Walk-In    Escorted by   Self    Service   Hospitalist    Admission type   Emergency            Arrival complaint   chest pain           Chief Complaint   Patient presents with    Chest Pain     Pt reports onset of CP 4pm after eating, tried 2 tums without relief, hx of 2 MIs       Initial Presentation: 77 y o  male presented to the ED with chest pain  Patient reports that prior to eating dinner tonight he began having a pain in the "pit" of his stomach which radiated up into his chest  He describes the pain as feeling as though "someone is pressing on the area" and notes that he has never had pain like this in the past  He states that he ate dinner but after dinner his pain worsened and he became nauseous as well  PMH: CAD, HTN, MI, bradycardia, GERD  PE: bradycardia, 2+ bilateral pitting edema  Plan: Observation for chest pain, bradycardia: trend troponins, telemetry, continue statin, ASA and BB, consult Cardiology         ED Triage Vitals   Temperature Pulse Respirations Blood Pressure SpO2   06/13/22 1758 06/13/22 1758 06/13/22 1758 06/13/22 1758 06/13/22 1758   98 7 °F (37 1 °C) 62 16 (!) 176/89 98 %      Temp Source Heart Rate Source Patient Position - Orthostatic VS BP Location FiO2 (%)   06/13/22 1758 06/13/22 1758 06/13/22 1758 06/13/22 1758 --   Oral Monitor Sitting Right arm       Pain Score       06/13/22 2319       No Pain          Wt Readings from Last 1 Encounters:   06/14/22 88 9 kg (195 lb 15 8 oz)     Additional Vital Signs:     Date/Time Temp Pulse Resp BP MAP (mmHg) SpO2 O2 Device   06/14/22 0700 97 5 °F (36 4 °C) 48 Abnormal  18 160/82 -- 99 % None (Room air)   06/14/22 0300 -- 44 Abnormal  16 121/64 87 98 % None (Room air)   06/14/22 0100 -- 54 Abnormal  -- 150/72 103 95 % --   06/13/22 2200 -- 54 Abnormal  18 156/72 104 98 % --   06/13/22 2045 -- 52 Abnormal  -- 143/71 100 97 % --   06/13/22 2000 -- 54 Abnormal  17 134/70 96 94 % --   06/13/22 1945 -- 54 Abnormal  -- 131/67 94 95 % --   06/13/22 1930 -- 58 -- 135/63 91 94 % --   06/13/22 1915 -- 58 17 128/70 92 96 % None (Room air)   06/13/22 1900 -- 56 17 156/76 109 98 % None (Room air)   06/13/22 1814 -- -- -- -- -- -- None (Room air)       Pertinent Labs/Diagnostic Test Results:   XR chest 1 view portable   ED Interpretation by Stephane Kuhn MD (06/13 2033)   Stable from prior, no acute cardiopulmonary abnormalities  Final Result by Reynold Damon MD (06/14 5123)      No acute cardiopulmonary disease                    Workstation performed: [id number]               Results from last 7 days   Lab Units 06/13/22  1843   WBC Thousand/uL 7 84   HEMOGLOBIN g/dL 13 2   HEMATOCRIT % 40 7   PLATELETS Thousands/uL 191   NEUTROS ABS Thousands/µL 4 67         Results from last 7 days   Lab Units 06/13/22  1843   SODIUM mmol/L 142   POTASSIUM mmol/L 4 4   CHLORIDE mmol/L 106   CO2 mmol/L 28   ANION GAP mmol/L 8   BUN mg/dL 14   CREATININE mg/dL 0 96   EGFR ml/min/1 73sq m 77   CALCIUM mg/dL 9 1     Results from last 7 days   Lab Units 06/13/22  1843   AST U/L 26   ALT U/L 22   ALK PHOS U/L 178*   TOTAL PROTEIN g/dL 6 4   ALBUMIN g/dL 3 8   TOTAL BILIRUBIN mg/dL 0 62         Results from last 7 days   Lab Units 06/13/22  1843   GLUCOSE RANDOM mg/dL 102           Results from last 7 days   Lab Units 06/13/22  2256 06/13/22 2058 06/13/22 1843   HS TNI 0HR ng/L  --   --  13   HS TNI 2HR ng/L  --  15  --    HSTNI D2 ng/L  --  2  --    HS TNI 4HR ng/L 16  --   --    HSTNI D4 ng/L 3  --   --            Results from last 7 days   Lab Units 06/13/22  1843   BNP pg/mL 114*       ED Treatment:   Medication Administration from 06/13/2022 1749 to 06/14/2022 0408       Date/Time Order Dose Route Action     06/13/2022 1902 nitroglycerin (NITROSTAT) SL tablet 0 4 mg 0 4 mg Sublingual Given     06/14/2022 0100 famotidine (PEPCID) tablet 20 mg 20 mg Oral Given        Past Medical History:   Diagnosis Date    Cardiac disease     Colon polyp     Coronary artery disease     Hypercholesteremia     Hypertension     Kidney stone     [facility]ocardial infarction ([facility] [address] )     5/04/2003: 2/8/2013     Present on Admission:   Chest pain   Essential hypertension   Bradycardia   Gastroesophageal reflux disease      Admitting Diagnosis: Chest pain [R07 9]  CAD S/P percutaneous coronary angioplasty [I25 10, Z98 61]  Chest pain with moderate risk for cardiac etiology [R07 9]  Chest pain, unspecified type [R07 9]  Age/Sex: 77 y o  male  Admission Orders:  Scheduled Medications:  allopurinol, 300 mg, Oral, Daily  aspirin, 81 mg, Oral, Daily  atorvastatin, 40 mg, Oral, Daily  cholecalciferol, 1,000 Units, Oral, Daily  enoxaparin, 40 mg, Subcutaneous, Daily  famotidine, 20 mg, Oral, BID  lisinopril, 20 mg, Oral, Daily  metoprolol succinate, 25 mg, Oral, Daily      Continuous IV Infusions:     PRN Meds:  acetaminophen, 650 mg, Oral, Q6H PRN        IP CONSULT TO CARDIOLOGY    Network Utilization Review Department  ATTENTION: Please call with any questions or concerns to [phone] and carefully listen to the prompts so that you are directed to the right person  All voicemails are confidential   Isabel Matthews all requests for admission clinical reviews, approved or denied determinations and any other requests to dedicated fax number below belonging to the campus where the patient is receiving treatment   List of dedicated fax numbers for the Facilities:  FACILITY NAME UR FAX NUMBER   ADMISSION DENIALS (Administrative/Medical Necessity) [phone]   [address] (Maternity/NICU/Pediatrics) [address] [facility],7Th Floor [facility] [address] [place] [address]  [phone]   Gabriel Nelson 50 [address] [facility] [place] [phone] [id number] [place] [address] [address] Pete Tsang [address] [facility] [address] [facility] [phone]

## 2022-06-14 NOTE — DISCHARGE INSTR - AVS FIRST PAGE
Dear Leslye Amanda,     [facility]  It is our hope that we were always able to exceed the expected standards for your care during your stay  You were hospitalized due to chest pain  You were cared for on the med-surg floor under the service of Schuyler Solis MD with the [facility] who covers for your primary care physician (PCP), Timoteo Steel MD, while you were hospitalized  If you have any questions or concerns related to this hospitalization, you may contact us at [phone]  For follow up as well as medication refills, we recommend that you follow up with your primary care physician  A registered nurse will reach out to you by phone within a few days after your discharge to answer any additional questions that you may have after going home  However, at this time we provide for you here, the most important instructions / recommendations at discharge:     Notable Medication Adjustments -   Decrease your metoprolol from 25mg to 12 5mg  Testing Required after Discharge -   Stress test discussion with your cardiologist (to be obtained within one week)  Important follow up information -   Follow up with your cardiologist in one week  Other Instructions -   Take good care  Please review this entire after visit summary as additional general instructions including medication list, appointments, activity, diet, any pertinent wound care, and other additional recommendations from your care team that may be provided for you        Sincerely,     Schuyler Solis MD

## 2022-08-16 ENCOUNTER — APPOINTMENT (OUTPATIENT)
Dept: LAB | Facility: CLINIC | Age: 77
End: 2022-08-16
Payer: COMMERCIAL

## 2022-08-16 DIAGNOSIS — C61 MALIGNANT NEOPLASM OF PROSTATE (HCC): ICD-10-CM

## 2022-08-16 LAB — PSA SERPL-MCNC: 4.9 NG/ML (ref 0–4)

## 2022-08-16 PROCEDURE — 36415 COLL VENOUS BLD VENIPUNCTURE: CPT

## 2022-08-16 PROCEDURE — G0103 PSA SCREENING: HCPCS

## 2022-10-03 ENCOUNTER — HOSPITAL ENCOUNTER (OUTPATIENT)
Dept: ULTRASOUND IMAGING | Facility: HOSPITAL | Age: 77
Discharge: HOME | End: 2022-10-03
Attending: INTERNAL MEDICINE
Payer: COMMERCIAL

## 2022-10-03 DIAGNOSIS — D18.03 LIVER HEMANGIOMA: ICD-10-CM

## 2022-10-03 DIAGNOSIS — K76.89 HEPATIC CYST: ICD-10-CM

## 2022-10-03 PROCEDURE — 76705 ECHO EXAM OF ABDOMEN: CPT

## 2022-10-03 NOTE — LETTER
[address] [place] [address]  [address] [facility] [address]      October 12, 2022    MRN: [id number]     Phone: [phone]     Dear Mr Ashia Douglas recently had a(n) Ultrasound performed on 10/3/2022 at  [address] [place] [address] that was requested by Daniella De La Fuente MD  The study was reviewed by a radiologist, which is a physician who specializes in medical imaging  The radiologist issued a report describing his or her findings  In that report there was a finding that the radiologist felt warranted further discussion with your health care provider and that discussion would be beneficial to you  The results were sent to Daniella De La Fuente MD on 10/06/2022 10:26 AM  We recommend that you contact Daniella De La Fuente MD at [phone] or set up an appointment to discuss the results of the imaging test  If you have already heard from Daniella De La Fuente MD regarding the results of your study, you can disregard this letter  This letter is not meant to alarm you, but intended to encourage you to follow-up on your results with the provider that sent you for the imaging study  In addition, we have enclosed answers to frequently asked questions by other patients who have also received a letter to review results with their health care provider (see page two)  Thank you for choosing [facility] for your medical imaging needs  FREQUENTLY ASKED QUESTIONS    1  Why am I receiving this letter? [facility] requires us to notify patients who have findings on imaging exams that may require more testing or follow-up with a health professional within the next 3 months  2  How serious is the finding on the imaging test?  This letter is sent to all patients who may need follow-up or more testing within the next 3 months  Receiving this letter does not necessarily mean you have a life-threatening imaging finding or disease  Recommendations in the radiologist’s imaging report are general in nature and it is up to your healthcare provider to say whether those recommendations make sense for your situation  You are strongly encouraged to talk to your health care provider about the results and ask whether additional steps need to be taken  3  Where can I get a copy of the final report for my recent radiology exam?  To get a full copy of the report you can access your records online at [email or web address] or please contact [facility] at [phone] Monday through Friday between 8 am and 6 pm          4  What do I need to do now? Please contact your health care provider who requested the imaging study to discuss what further actions (if any) are needed  You may have already heard from (your ordering provider) in regard to this test in which case you can disregard this letter  NOTICE IN ACCORDANCE WITH THE [place] STATE “PATIENT TEST RESULT INFORMATION ACT OF 2018”    You are receiving this notice as a result of a determination by your diagnostic imaging service that further discussions of your test results are warranted and would be beneficial to you  The complete results of your test or tests have been or will be sent to the health care practitioner that ordered the test or tests  It is recommended that you contact your health care practitioner to discuss your results as soon as possible

## 2022-10-06 ENCOUNTER — TELEPHONE (OUTPATIENT)
Dept: GASTROENTEROLOGY | Facility: CLINIC | Age: 77
End: 2022-10-06

## 2022-10-06 NOTE — RESULT ENCOUNTER NOTE
Please call the patient regarding his abnormal result  Please advise patient that his cysts and hemangioma in the liver are stable as per ultrasound  There is no change from prior ultrasound done few months ago      Follow up in my office as needed

## 2022-10-06 NOTE — TELEPHONE ENCOUNTER
Patients GI provider:  Dr Roshan Eckert    Number to return call: [phone] opt  3    Reason for call: Radiology calling with significant findings in US right upper quadrant  Ready to read in [address] [place] [address] Rd   [place] Text sent,    Scheduled procedure/appointment date if applicable: N/A

## 2022-10-13 ENCOUNTER — HOSPITAL ENCOUNTER (OUTPATIENT)
Facility: HOSPITAL | Age: 77
Setting detail: OBSERVATION
Discharge: HOME | End: 2022-10-14
Attending: EMERGENCY MEDICINE | Admitting: FAMILY MEDICINE
Payer: COMMERCIAL

## 2022-10-13 ENCOUNTER — APPOINTMENT (OUTPATIENT)
Dept: RADIOLOGY | Facility: HOSPITAL | Age: 77
End: 2022-10-13
Payer: COMMERCIAL

## 2022-10-13 ENCOUNTER — APPOINTMENT (EMERGENCY)
Dept: CT IMAGING | Facility: HOSPITAL | Age: 77
End: 2022-10-13
Payer: COMMERCIAL

## 2022-10-13 DIAGNOSIS — R00.1 BRADYCARDIA: ICD-10-CM

## 2022-10-13 DIAGNOSIS — Z98.61 HISTORY OF PTCA: ICD-10-CM

## 2022-10-13 DIAGNOSIS — K21.9 GASTROESOPHAGEAL REFLUX DISEASE WITHOUT ESOPHAGITIS: ICD-10-CM

## 2022-10-13 DIAGNOSIS — R07.9 CHEST PAIN, UNSPECIFIED TYPE: Primary | ICD-10-CM

## 2022-10-13 PROBLEM — R06.02 SHORTNESS OF BREATH: Status: ACTIVE | Noted: 2022-10-13

## 2022-10-13 LAB
2HR DELTA HS TROPONIN: 2 NG/L
ALBUMIN SERPL BCP-MCNC: 3.8 G/DL (ref 3.5–5)
ALP SERPL-CCNC: 130 U/L (ref 34–104)
ALT SERPL W P-5'-P-CCNC: 24 U/L (ref 7–52)
ANION GAP SERPL CALCULATED.3IONS-SCNC: 8 MMOL/L (ref 4–13)
AST SERPL W P-5'-P-CCNC: 39 U/L (ref 13–39)
BACTERIA UR QL AUTO: NORMAL /HPF
BASOPHILS # BLD AUTO: 0.05 THOUSANDS/ΜL (ref 0–0.1)
BASOPHILS NFR BLD AUTO: 1 % (ref 0–1)
BILIRUB SERPL-MCNC: 0.64 MG/DL (ref 0.2–1)
BILIRUB UR QL STRIP: NEGATIVE
BNP SERPL-MCNC: 97 PG/ML (ref 0–100)
BUN SERPL-MCNC: 14 MG/DL (ref 5–25)
CALCIUM SERPL-MCNC: 9.4 MG/DL (ref 8.4–10.2)
CARDIAC TROPONIN I PNL SERPL HS: 18 NG/L
CARDIAC TROPONIN I PNL SERPL HS: 20 NG/L
CHLORIDE SERPL-SCNC: 105 MMOL/L (ref 96–108)
CLARITY UR: CLEAR
CO2 SERPL-SCNC: 29 MMOL/L (ref 21–32)
COLOR UR: ABNORMAL
CREAT SERPL-MCNC: 0.91 MG/DL (ref 0.6–1.3)
EOSINOPHIL # BLD AUTO: 0.08 THOUSAND/ΜL (ref 0–0.61)
EOSINOPHIL NFR BLD AUTO: 1 % (ref 0–6)
ERYTHROCYTE [DISTWIDTH] IN BLOOD BY AUTOMATED COUNT: 15 % (ref 11.6–15.1)
GFR SERPL CREATININE-BSD FRML MDRD: 81 ML/MIN/1.73SQ M
GLUCOSE SERPL-MCNC: 97 MG/DL (ref 65–140)
GLUCOSE UR STRIP-MCNC: NEGATIVE MG/DL
HCT VFR BLD AUTO: 40.7 % (ref 36.5–49.3)
HGB BLD-MCNC: 13.4 G/DL (ref 12–17)
HGB UR QL STRIP.AUTO: NEGATIVE
IMM GRANULOCYTES # BLD AUTO: 0.02 THOUSAND/UL (ref 0–0.2)
IMM GRANULOCYTES NFR BLD AUTO: 0 % (ref 0–2)
KETONES UR STRIP-MCNC: NEGATIVE MG/DL
LEUKOCYTE ESTERASE UR QL STRIP: NEGATIVE
LIPASE SERPL-CCNC: 22 U/L (ref 11–82)
LYMPHOCYTES # BLD AUTO: 3.18 THOUSANDS/ΜL (ref 0.6–4.47)
LYMPHOCYTES NFR BLD AUTO: 37 % (ref 14–44)
MCH RBC QN AUTO: 32 PG (ref 26.8–34.3)
MCHC RBC AUTO-ENTMCNC: 32.9 G/DL (ref 31.4–37.4)
MCV RBC AUTO: 97 FL (ref 82–98)
MONOCYTES # BLD AUTO: 1.1 THOUSAND/ΜL (ref 0.17–1.22)
MONOCYTES NFR BLD AUTO: 13 % (ref 4–12)
NEUTROPHILS # BLD AUTO: 4.24 THOUSANDS/ΜL (ref 1.85–7.62)
NEUTS SEG NFR BLD AUTO: 48 % (ref 43–75)
NITRITE UR QL STRIP: NEGATIVE
NON-SQ EPI CELLS URNS QL MICRO: NORMAL /HPF
NRBC BLD AUTO-RTO: 0 /100 WBCS
PH UR STRIP.AUTO: 7.5 [PH]
PLATELET # BLD AUTO: 264 THOUSANDS/UL (ref 149–390)
PMV BLD AUTO: 10.3 FL (ref 8.9–12.7)
POTASSIUM SERPL-SCNC: 3.9 MMOL/L (ref 3.5–5.3)
PROT SERPL-MCNC: 6.8 G/DL (ref 6.4–8.4)
PROT UR STRIP-MCNC: ABNORMAL MG/DL
RBC # BLD AUTO: 4.19 MILLION/UL (ref 3.88–5.62)
RBC #/AREA URNS AUTO: NORMAL /HPF
SODIUM SERPL-SCNC: 142 MMOL/L (ref 135–147)
SP GR UR STRIP.AUTO: >=1.05 (ref 1–1.03)
UROBILINOGEN UR STRIP-ACNC: <2 MG/DL
WBC # BLD AUTO: 8.67 THOUSAND/UL (ref 4.31–10.16)
WBC #/AREA URNS AUTO: NORMAL /HPF

## 2022-10-13 PROCEDURE — 71275 CT ANGIOGRAPHY CHEST: CPT

## 2022-10-13 PROCEDURE — 81001 URINALYSIS AUTO W/SCOPE: CPT

## 2022-10-13 PROCEDURE — 84484 ASSAY OF TROPONIN QUANT: CPT

## 2022-10-13 PROCEDURE — 87636 SARSCOV2 & INF A&B AMP PRB: CPT

## 2022-10-13 PROCEDURE — 74174 CTA ABD&PLVS W/CONTRAST: CPT

## 2022-10-13 PROCEDURE — 83880 ASSAY OF NATRIURETIC PEPTIDE: CPT

## 2022-10-13 PROCEDURE — 80053 COMPREHEN METABOLIC PANEL: CPT

## 2022-10-13 PROCEDURE — 93005 ELECTROCARDIOGRAM TRACING: CPT

## 2022-10-13 PROCEDURE — 85025 COMPLETE CBC W/AUTO DIFF WBC: CPT

## 2022-10-13 PROCEDURE — 83690 ASSAY OF LIPASE: CPT

## 2022-10-13 PROCEDURE — 99204 OFFICE O/P NEW MOD 45 MIN: CPT | Performed by: FAMILY MEDICINE

## 2022-10-13 PROCEDURE — 36415 COLL VENOUS BLD VENIPUNCTURE: CPT

## 2022-10-13 PROCEDURE — 96375 TX/PRO/DX INJ NEW DRUG ADDON: CPT

## 2022-10-13 PROCEDURE — 96374 THER/PROPH/DIAG INJ IV PUSH: CPT

## 2022-10-13 PROCEDURE — 99285 EMERGENCY DEPT VISIT HI MDM: CPT

## 2022-10-13 PROCEDURE — G1004 CDSM NDSC: HCPCS

## 2022-10-13 RX ORDER — ATORVASTATIN CALCIUM 40 MG/1
40 TABLET, FILM COATED ORAL DAILY
Status: DISCONTINUED | OUTPATIENT
Start: 2022-10-14 | End: 2022-10-14 | Stop reason: HOSPADM

## 2022-10-13 RX ORDER — ONDANSETRON 2 MG/ML
4 INJECTION INTRAMUSCULAR; INTRAVENOUS EVERY 6 HOURS PRN
Status: DISCONTINUED | OUTPATIENT
Start: 2022-10-13 | End: 2022-10-14 | Stop reason: HOSPADM

## 2022-10-13 RX ORDER — ALLOPURINOL 300 MG/1
300 TABLET ORAL DAILY
Status: DISCONTINUED | OUTPATIENT
Start: 2022-10-14 | End: 2022-10-13

## 2022-10-13 RX ORDER — METOPROLOL SUCCINATE 25 MG/1
12.5 TABLET, EXTENDED RELEASE ORAL DAILY
Status: DISCONTINUED | OUTPATIENT
Start: 2022-10-14 | End: 2022-10-14

## 2022-10-13 RX ORDER — OXYCODONE HYDROCHLORIDE 5 MG/1
2.5 TABLET ORAL EVERY 6 HOURS PRN
Status: DISCONTINUED | OUTPATIENT
Start: 2022-10-13 | End: 2022-10-14 | Stop reason: HOSPADM

## 2022-10-13 RX ORDER — MORPHINE SULFATE 4 MG/ML
4 INJECTION, SOLUTION INTRAMUSCULAR; INTRAVENOUS ONCE
Status: COMPLETED | OUTPATIENT
Start: 2022-10-13 | End: 2022-10-13

## 2022-10-13 RX ORDER — NITROGLYCERIN 0.4 MG/1
0.4 TABLET SUBLINGUAL
Status: DISCONTINUED | OUTPATIENT
Start: 2022-10-13 | End: 2022-10-14 | Stop reason: HOSPADM

## 2022-10-13 RX ORDER — FAMOTIDINE 20 MG/1
20 TABLET, FILM COATED ORAL 2 TIMES DAILY
Status: DISCONTINUED | OUTPATIENT
Start: 2022-10-14 | End: 2022-10-14 | Stop reason: HOSPADM

## 2022-10-13 RX ORDER — ONDANSETRON 2 MG/ML
4 INJECTION INTRAMUSCULAR; INTRAVENOUS ONCE
Status: COMPLETED | OUTPATIENT
Start: 2022-10-13 | End: 2022-10-13

## 2022-10-13 RX ORDER — ASPIRIN 81 MG/1
81 TABLET, CHEWABLE ORAL DAILY
Status: DISCONTINUED | OUTPATIENT
Start: 2022-10-14 | End: 2022-10-14 | Stop reason: HOSPADM

## 2022-10-13 RX ORDER — AMOXICILLIN 250 MG
1 CAPSULE ORAL 2 TIMES DAILY
Status: DISCONTINUED | OUTPATIENT
Start: 2022-10-14 | End: 2022-10-14

## 2022-10-13 RX ORDER — ASPIRIN 81 MG/1
324 TABLET, CHEWABLE ORAL ONCE
Status: COMPLETED | OUTPATIENT
Start: 2022-10-13 | End: 2022-10-13

## 2022-10-13 RX ORDER — MELATONIN
1000 DAILY
Status: DISCONTINUED | OUTPATIENT
Start: 2022-10-14 | End: 2022-10-14 | Stop reason: HOSPADM

## 2022-10-13 RX ORDER — LISINOPRIL 10 MG/1
40 TABLET ORAL DAILY
Status: DISCONTINUED | OUTPATIENT
Start: 2022-10-14 | End: 2022-10-14

## 2022-10-13 RX ADMIN — MORPHINE SULFATE 4 MG: 4 INJECTION INTRAVENOUS at 20:46

## 2022-10-13 RX ADMIN — ONDANSETRON 4 MG: 2 INJECTION INTRAMUSCULAR; INTRAVENOUS at 20:46

## 2022-10-13 RX ADMIN — IOHEXOL 100 ML: 350 INJECTION, SOLUTION INTRAVENOUS at 21:00

## 2022-10-13 RX ADMIN — ASPIRIN 81 MG CHEWABLE TABLET 324 MG: 81 TABLET CHEWABLE at 22:55

## 2022-10-13 NOTE — Clinical Note
Case was discussed with Aditi and the patient's admission status was agreed to be Admission Status: observation status to the service of Dr Radha Carson

## 2022-10-14 VITALS
DIASTOLIC BLOOD PRESSURE: 79 MMHG | BODY MASS INDEX: 28.5 KG/M2 | OXYGEN SATURATION: 97 % | WEIGHT: 193 LBS | RESPIRATION RATE: 16 BRPM | TEMPERATURE: 98.2 F | SYSTOLIC BLOOD PRESSURE: 155 MMHG | HEART RATE: 54 BPM

## 2022-10-14 LAB
4HR DELTA HS TROPONIN: 4 NG/L
ANION GAP SERPL CALCULATED.3IONS-SCNC: 4 MMOL/L (ref 4–13)
ATRIAL RATE: 57 BPM
BUN SERPL-MCNC: 12 MG/DL (ref 5–25)
CALCIUM SERPL-MCNC: 8.9 MG/DL (ref 8.4–10.2)
CARDIAC TROPONIN I PNL SERPL HS: 22 NG/L
CHLORIDE SERPL-SCNC: 108 MMOL/L (ref 96–108)
CO2 SERPL-SCNC: 29 MMOL/L (ref 21–32)
CREAT SERPL-MCNC: 0.8 MG/DL (ref 0.6–1.3)
ERYTHROCYTE [DISTWIDTH] IN BLOOD BY AUTOMATED COUNT: 15 % (ref 11.6–15.1)
FLUAV RNA RESP QL NAA+PROBE: NEGATIVE
FLUBV RNA RESP QL NAA+PROBE: NEGATIVE
GFR SERPL CREATININE-BSD FRML MDRD: 86 ML/MIN/1.73SQ M
GLUCOSE SERPL-MCNC: 97 MG/DL (ref 65–140)
HCT VFR BLD AUTO: 38.7 % (ref 36.5–49.3)
HGB BLD-MCNC: 12.4 G/DL (ref 12–17)
MCH RBC QN AUTO: 31.6 PG (ref 26.8–34.3)
MCHC RBC AUTO-ENTMCNC: 32 G/DL (ref 31.4–37.4)
MCV RBC AUTO: 99 FL (ref 82–98)
P AXIS: 60 DEGREES
PLATELET # BLD AUTO: 230 THOUSANDS/UL (ref 149–390)
PMV BLD AUTO: 10.3 FL (ref 8.9–12.7)
POTASSIUM SERPL-SCNC: 4.5 MMOL/L (ref 3.5–5.3)
PR INTERVAL: 184 MS
QRS AXIS: -48 DEGREES
QRSD INTERVAL: 132 MS
QT INTERVAL: 482 MS
QTC INTERVAL: 469 MS
RBC # BLD AUTO: 3.93 MILLION/UL (ref 3.88–5.62)
SARS-COV-2 RNA RESP QL NAA+PROBE: NEGATIVE
SODIUM SERPL-SCNC: 141 MMOL/L (ref 135–147)
T WAVE AXIS: 77 DEGREES
VENTRICULAR RATE: 57 BPM
WBC # BLD AUTO: 8.07 THOUSAND/UL (ref 4.31–10.16)

## 2022-10-14 PROCEDURE — 93010 ELECTROCARDIOGRAM REPORT: CPT | Performed by: INTERNAL MEDICINE

## 2022-10-14 PROCEDURE — 80048 BASIC METABOLIC PNL TOTAL CA: CPT

## 2022-10-14 PROCEDURE — 99214 OFFICE O/P EST MOD 30 MIN: CPT | Performed by: INTERNAL MEDICINE

## 2022-10-14 PROCEDURE — 84484 ASSAY OF TROPONIN QUANT: CPT

## 2022-10-14 PROCEDURE — 85027 COMPLETE CBC AUTOMATED: CPT

## 2022-10-14 PROCEDURE — NC001 PR NO CHARGE: Performed by: FAMILY MEDICINE

## 2022-10-14 PROCEDURE — 36415 COLL VENOUS BLD VENIPUNCTURE: CPT

## 2022-10-14 RX ORDER — LISINOPRIL 20 MG/1
40 TABLET ORAL DAILY
Status: DISCONTINUED | OUTPATIENT
Start: 2022-10-14 | End: 2022-10-14 | Stop reason: HOSPADM

## 2022-10-14 RX ORDER — ALLOPURINOL 300 MG/1
300 TABLET ORAL DAILY
Status: DISCONTINUED | OUTPATIENT
Start: 2022-10-14 | End: 2022-10-14 | Stop reason: HOSPADM

## 2022-10-14 RX ORDER — METOPROLOL SUCCINATE 25 MG/1
12.5 TABLET, EXTENDED RELEASE ORAL DAILY
Qty: 15 TABLET | Refills: 0 | Status: SHIPPED | OUTPATIENT
Start: 2022-10-14

## 2022-10-14 RX ORDER — ENOXAPARIN SODIUM 100 MG/ML
40 INJECTION SUBCUTANEOUS DAILY
Status: DISCONTINUED | OUTPATIENT
Start: 2022-10-14 | End: 2022-10-14 | Stop reason: HOSPADM

## 2022-10-14 RX ORDER — PANTOPRAZOLE SODIUM 40 MG/1
40 TABLET, DELAYED RELEASE ORAL DAILY
Qty: 30 TABLET | Refills: 0 | Status: SHIPPED | OUTPATIENT
Start: 2022-10-14

## 2022-10-14 RX ADMIN — ALLOPURINOL 300 MG: 300 TABLET ORAL at 09:32

## 2022-10-14 RX ADMIN — ASPIRIN 81 MG CHEWABLE TABLET 81 MG: 81 TABLET CHEWABLE at 09:32

## 2022-10-14 RX ADMIN — FAMOTIDINE 20 MG: 20 TABLET ORAL at 09:32

## 2022-10-14 RX ADMIN — ENOXAPARIN SODIUM 40 MG: 40 INJECTION SUBCUTANEOUS at 09:32

## 2022-10-14 RX ADMIN — Medication 1000 UNITS: at 09:32

## 2022-10-14 RX ADMIN — LISINOPRIL 40 MG: 20 TABLET ORAL at 09:32

## 2022-10-14 RX ADMIN — ATORVASTATIN CALCIUM 40 MG: 40 TABLET, FILM COATED ORAL at 09:32

## 2022-10-14 NOTE — ASSESSMENT & PLAN NOTE
· Had SOB, with diaphoresis and pallor  · CTA CAP: no acute finding in the lungs  · Patient on room air saturating well  · Started with pain and resolved after pain subsided  · Might be related to pain, doesn't seem volume overloaded, BNP normal    Plan:  - Continue monitoring  - Pain control   - Covid/flu negative

## 2022-10-14 NOTE — DISCHARGE SUMMARY
[facility]  Discharge- Dakotah Hedrick 1945, 69 y o  male MRN: [id number]  Unit/Bed#: S -01 Encounter: [id number]  Primary Care Provider: Berkley Breen MD   Date and time admitted to hospital: 10/13/2022  8:34 PM    * Chest pain  Assessment & Plan  · The patient came in to the hospital with chest pain while seated that was epigastric and went up to substernal region, nausea, diaphoresis, SOB and pallor  Reported at time he feels like this when he was having MI in the past   · History of MI-s, CAD, HLD, HTN, AAA, as well as hiatal hernia and GERD  · EKG: No significant change from before, Sinus bradycardia  · POA labs: Toponins flat, BNP normal, otherwise unremarkable labs  · Echo from 06/14/2022 showed EF of 55% and Grade I diastolic dysfunction  · CTA chest abdomen pelvis: No evidence of aortic dissection or aneurysm  Dense atherosclerotic calcifications coronary arteries in keeping with atherosclerotic heart disease  Diffuse colonic diverticulosis without evidence of diverticulitis  Prostatomegaly  Pt without chest pain since admission  States pain was epigastric when it occurred    Pain likely related to reflux     - Start PPI  - Per cardiology, they are comfortable with pt discharge today with outpatient follow up   - Continue Aspirin, and Statin      Gastroesophageal reflux disease  Assessment & Plan  · Continue Pepcid  - add PPI to regemin    Bradycardia  Assessment & Plan  · History of chronic bradycardia with HR around 50  · Takes Toprol 12 5 (recently decreased from 25 mg)  · Has been asymptomatic  · HR dropped to 37 from time to time during the night, during the last admission it was around 45 and higher    Plan:    - continue toprol 12 5mg per cardiology    Shortness of breath  Assessment & Plan  · Had SOB, with diaphoresis and pallor  · CTA CAP: no acute finding in the lungs  · Patient on room air saturating well  · Started with pain and resolved after pain subsided  · Might be related to pain, doesn't seem volume overloaded, BNP normal    Plan:  - Continue monitoring  - Pain control   - Covid/flu negative    CAD S/P percutaneous coronary angioplasty  Assessment & Plan  · Patient had four stents s/p MI in 2003 and 2013  · Is on Aspirin, statin and Toprol  · Continue home medications    Dyslipidemia  Assessment & Plan  · Continue statin  · Last Lipid panel normal    Essential hypertension  Assessment & Plan  · Continue home medications, is on Ramipril 5 mg BID (ordered as lisinopril) and Toprol-XL 12 5 mg daily  - Per cardiology, Toprol-XL is to be continued at 12 5 mg daily         Discharging Resident: Dave Carlson  Discharging Attending: Mariposa Verdugo MD  PCP: Day Louie MD  Admission Date:   Admission Orders (From admission, onward)     Ordered        10/13/22 2317  Place in Observation  Once                      Discharge Date: 10/14/22    Consultations During Hospital Stay:  · caridology    Procedures Performed:   · ECG, CTA CAP    Significant Findings / Test Results:   · none    Incidental Findings:   · bradycardia   · Prostatomegaly  · Diffuse colonic diverticulosis without evidence of diverticulitis  · Dense atherosclerotic calcifications coronary arteries in keeping with atherosclerotic heart disease  · No evidence of aortic dissection or aneurysm  Test Results Pending at Discharge (will require follow up):   · none     Outpatient Tests Requested:  · Stress test    Complications:  none    Reason for Admission: chest pain    Hospital Course:   Clayton Mercado is a 69 y o  male patient who originally presented to the hospital on 10/13/2022 due to chest pain originating from the epigastrium  Associated SOB and diaphoresis and has hx of prior MI with similar presentation, as well as hx hiatal hernia and GERD  Workup negative including ECG, troponins x3, BNP, lipase, CTA CAP  Pain resolved   Patient was discharged home newly prescribed a proton pump inhibitor  Please see above list of diagnoses and related plan for additional information  Condition at Discharge: good    Discharge Day Visit / Exam:   * Please refer to separate progress note for these details *    Discussion with Family: Patient declined call to   Discharge instructions/Information to patient and family:   See after visit summary for information provided to patient and family  Provisions for Follow-Up Care:  See after visit summary for information related to follow-up care and any pertinent home health orders  Disposition:   Home    Planned Readmission: none    Discharge Medications:  See after visit summary for reconciled discharge medications provided to patient and/or family        **Please Note: This note may have been constructed using a voice recognition system**

## 2022-10-14 NOTE — ASSESSMENT & PLAN NOTE
· History of chronic bradycardia with HR around 50  · Takes Toprol 12 5 (recently decreased from 25 mg)  · Has been asymptomatic  · HR dropped to 37 from time to time during the night, during the last admission it was around 45 and higher    Plan:    - continue toprol 12 5mg per cardiology

## 2022-10-14 NOTE — ASSESSMENT & PLAN NOTE
· Had SOB, with diaphoresis and pallor  · CTA CAP: no acute finding in the lungs  · Patient on room air saturating well  · Started with pain and resolved after pain subsided  · Might be related to pain     Plan:  · Continue monitoring  · Pain control   · Follow up with Covid/flu

## 2022-10-14 NOTE — ASSESSMENT & PLAN NOTE
· Patient had four stents s/p MI in 2003 and 2013  · Is on Aspirin, statin and Toprol  · Continue home medications

## 2022-10-14 NOTE — ASSESSMENT & PLAN NOTE
· The patient came in to the hospital with chest pain while seated that was epigastric and went up to substernal region, nausea, diaphoresis, SOB and pallor  Reported at time he feels like this when he was having MI in the past   · History of MI-s, CAD, HLD, HTN, AAA, as well as hiatal hernia and GERD  · EKG: No significant change from before, Sinus bradycardia  · POA labs: Toponins flat, BNP normal, otherwise unremarkable labs  · Echo from 06/14/2022 showed EF of 55% and Grade I diastolic dysfunction  · CTA chest abdomen pelvis: No evidence of aortic dissection or aneurysm  Dense atherosclerotic calcifications coronary arteries in keeping with atherosclerotic heart disease  Diffuse colonic diverticulosis without evidence of diverticulitis  Prostatomegaly  Pt without chest pain since admission  States pain was epigastric when it occurred    Pain likely related to reflux     - Start PPI  - Per cardiology, they are comfortable with pt discharge today with outpatient follow up   - Continue Aspirin, and Statin

## 2022-10-14 NOTE — ED ATTENDING ATTESTATION
10/13/2022  I, Atilio Carter MD, saw and evaluated the patient  I have discussed the patient with the resident/non-physician practitioner and agree with the resident's/non-physician practitioner's findings, Plan of Care, and MDM as documented in the resident's/non-physician practitioner's note, except where noted  All available labs and Radiology studies were reviewed  I was present for key portions of any procedure(s) performed by the resident/non-physician practitioner and I was immediately available to provide assistance  At this point I agree with the current assessment done in the Emergency Department  I have conducted an independent evaluation of this patient a history and physical is as follows:    ED Course  ED Course as of 10/13/22 2114   Thu Oct 13, 2022   2111 CTA reviewed  No aortic dissection or AAA rupture by my read           Critical Care Time  Procedures

## 2022-10-14 NOTE — ASSESSMENT & PLAN NOTE
· The patient came in to the hospital with chest pain while seating that was epigastric and went up to substernal region, nausea, diaphoresis, SOB and pallor, says that this time he feels like when he was having MI in the past   · History of MI-s, CAD, HLD, HTN, AAA  · EKG: No significant change from before, Sinus bradycardia  · POA labs: Initial Toponins 18-->20, BNP normal, otherwise unremarkable labs  · KATELYNN score is 3, Heart score 6 (Risk of MACE of 12-16 6%)  · Lipid panel was normal in 06/14/2022  · Echo from 06/14/2022 showed EF of 55% and Grade I diastolic dysfunction  · CTA chest abdomen pelvis: No evidence of aortic dissection or aneurysm  Dense atherosclerotic calcifications coronary arteries in keeping with atherosclerotic heart disease  Diffuse colonic diverticulosis without evidence of diverticulitis  Prostatomegaly    · Was supposed to get a stress test outpatient  · Has a history of hiatal hernia  · Differentials include cardiac vs musculoskeletal pain vs GI cause    Plan:  · Rule out cardiac cause  · Trend troponins  · Telemetry monitoring  · Continue Aspirin, Statin and BB  · Further cardiology workup

## 2022-10-14 NOTE — ASSESSMENT & PLAN NOTE
· Continue home medications, is on Ramipril 5 mg BID (ordered as lisinopril) and Toprol-XL 12 5 mg daily

## 2022-10-14 NOTE — CONSULTS
Consultation - Cardiology Team 1  Jarrod Canada 69 y o  male MRN: [id number]  Unit/Bed#: S -01 Encounter: [id number]    Assessment/Plan     Principal Problem:    Chest pain  Active Problems:    Essential hypertension    Bradycardia    Dyslipidemia    CAD S/P percutaneous coronary angioplasty    Gastroesophageal reflux disease    Shortness of breath      Assessment/Plan    1  Epigastric/abdominal pain  No ACS  HS troponin negative x3  EKG no ischemic changes  CTA chest -no evidence of aortic dissection or aneurysm  Coronary calcifications noted  No central PE detected but not a dedicated CT pulmonary angiogram   EKG- NSR bifasicular block  Consider GI evaluation  Will review with attending but anticipate discharge today    2  CAD- report 2 prior MI's with 4 stents  Unknown coronary anatomy  AP-aspirin 81 mg daily   Statin-atorvastatin 40 mg  BB-Toprol 12 5mg (recently decreased from 25 mg for bradycardia)  No exertional chest pain or pressure  Follows with Dr Kadi Brown- has appt Tuesday   Pharmacological Myoview stress test 07/2022 no ischemia     3  HTN- -160's   Takes Toprol 12 5 mg daily and Altace 5 mg b i d  Outpatient cardiology follow up     4  H/O Arielle Guevara- on pepcid   Plans to switch to PPI    5  Bradycardia-sinus with no high-degree AV block  Asymptomatic heart rate 40 to 60s  Continue Toprol 12 5 mg daily  History of Present Illness   Physician Requesting Consult: Wander Short MD  Reason for Consult / Principal Problem: chest pain    HPI: Jarrod Canada is a 69y o  year old male with history of polio, GERD, HTN, CAD s/p MI 2003/2013 with reported 4 stents ( follows Dr Kadi Brown) , bradycardia who presents with abdominal pain  He said he felt like he had a large knot in his upper abdomen  No cp/pressure  No shortness of breath  Associated with nausea, diaphoresis  He said he came right to the emergency room with his wife    He felt like the discomfort resolved after morphine while he was lying under the CT scan camera  It has not recurred  He was similar to what he experienced back in June when he was hospitalized  This was followed up with a Cardiology consult  No ACS  He had an outpatient stress test through his cardiologist Dr Bakari Nielson which showed no ischemia  He has no exertional chest pain, shortness of breath   HS troponin negative X3  BNP 97    Pharmacological Myoview stress test 07/2022-anterior apical infarct    Pharmacological Myoview stress test 10/2020 showed a small area of infarct involving anterior apical wall no ischemia  TTE 06/2022- normal LVEF  Grade 1 diastolic dysfunction  Hypokinetic apical anterior,  apical septal and apex  Inpatient consult to Cardiology  Consult performed by: SHARON Johnson  Consult ordered by: Dariana Franks MD          Review of Systems   Constitutional: Negative  HENT: Negative  Eyes: Negative  Respiratory: Negative  Cardiovascular: Negative for chest pain, palpitations and leg swelling  Gastrointestinal: Positive for abdominal pain and nausea  Endocrine: Negative  Genitourinary: Negative  Musculoskeletal: Negative  Skin: Negative  Allergic/Immunologic: Negative  Neurological: Negative  Hematological: Negative  Psychiatric/Behavioral: Negative  All other systems reviewed and are negative        Historical Information   Past Medical History:   Diagnosis Date   • Aortic aneurysm [facility])    • Cardiac disease    • Colon polyp    • Coronary artery disease    • Hypercholesteremia    • Hypertension    • Kidney stone    • Myocardial infarction ([facility] [address] )     5/04/2003: 2/8/2013     Past Surgical History:   Procedure Laterality Date   • CARDIAC SURGERY      patient reports having 4 stents placed   • COLONOSCOPY  07/2015    Due 7/2020   • COLONOSCOPY     • HERNIA REPAIR     • LITHOTRIPSY      x4   • OTHER SURGICAL HISTORY      stents   • TONSILLECTOMY     • UPPER GASTROINTESTINAL ENDOSCOPY       Social History     Substance and Sexual Activity   Alcohol Use Not Currently     Social History     Substance and Sexual Activity   Drug Use Never     Social History     Tobacco Use   Smoking Status Former Smoker   • Years: 0 00   Smokeless Tobacco Never Used   Tobacco Comment    smoked in high school     Family History:   Family History   Problem Relation Age of Onset   • Heart disease Father    • Diabetes Paternal Uncle        Meds/Allergies   current meds:   Current Facility-Administered Medications   Medication Dose Route Frequency   • allopurinol (ZYLOPRIM) tablet 300 mg  300 mg Oral Daily   • aspirin chewable tablet 81 mg  81 mg Oral Daily   • atorvastatin (LIPITOR) tablet 40 mg  40 mg Oral Daily   • cholecalciferol (VITAMIN D3) tablet 1,000 Units  1,000 Units Oral Daily   • enoxaparin (LOVENOX) subcutaneous injection 40 mg  40 mg Subcutaneous Daily   • famotidine (PEPCID) tablet 20 mg  20 mg Oral BID   • lisinopril (ZESTRIL) tablet 40 mg  40 mg Oral Daily   • morphine injection 2 mg  2 mg Intravenous Q6H PRN   • nitroglycerin (NITROSTAT) SL tablet 0 4 mg  0 4 mg Sublingual Q5 Min PRN   • ondansetron (ZOFRAN) injection 4 mg  4 mg Intravenous Q6H PRN   • oxyCODONE (ROXICODONE) IR tablet 2 5 mg  2 5 mg Oral Q6H PRN    and PTA meds:    Medications Prior to Admission   Medication   • allopurinol (ZYLOPRIM) 300 mg tablet   • aspirin 81 mg chewable tablet   • atorvastatin (LIPITOR) 40 mg tablet   • cholecalciferol (VITAMIN D3) 1,000 units tablet   • famotidine (PEPCID) 20 mg tablet   • metoprolol succinate (TOPROL-XL) 25 mg 24 hr tablet   • ramipril (ALTACE) 5 mg capsule   • Diclofenac Sodium (VOLTAREN) 1 %   • nitroglycerin (NITROSTAT) 0 4 mg SL tablet   • senna-docusate sodium (SENOKOT S) 8 6-50 mg per tablet     No Known Allergies    Objective   Vitals: Blood pressure 155/79, pulse (!) 54, temperature 98 2 °F (36 8 °C), resp   rate 16, weight 87 5 kg (193 lb), SpO2 97 %   Orthostatic Blood Pressures    Flowsheet Row Most Recent Value   Blood Pressure 155/79 filed at 10/14/2022 0708   Patient Position - Orthostatic VS Sitting filed at 10/14/2022 6178            Intake/Output Summary (Last 24 hours) at 10/14/2022 1036  Last data filed at 10/14/2022 0934  Gross per 24 hour   Intake 180 ml   Output 525 ml   Net -345 ml       Invasive Devices  Report    Peripheral Intravenous Line  Duration           Peripheral IV 10/13/22 Left Antecubital <1 day                Physical Exam: /79   Pulse (!) 54   Temp 98 2 °F (36 8 °C)   Resp 16   Wt 87 5 kg (193 lb)   SpO2 97%   BMI 28 50 kg/m²   General Appearance:    Alert, cooperative, no distress, appears stated age   Head:    Normocephalic, no scleral icterus   Eyes:    PERRL   Nose:   Nares normal, septum midline, mucosa normal, no drainage    Throat:   Lips, mucosa, and tongue normal   Neck:   Supple, symmetrical, trachea midline     no carotid bruit or JVD   Back:     Symmetric   Lungs:     Clear to auscultation bilaterally, respirations unlabored   Chest Wall:    No tenderness or deformity    Heart:    Regular rate and rhythm, S1 and S2 normal, no murmur, rub   or gallop   Abdomen:     Soft, non-tender, bowel sounds active all four quadrants,     no masses, no organomegaly   Extremities:   Extremities normal, atraumatic, no cyanosis or edema   Pulses:   2+ and symmetric all extremities   Skin:   Skin color, texture, turgor normal, no rashes or lesions   Neurologic:   Alert and oriented to person place and time   No focal deficits       Lab Results:   Recent Results (from the past 72 hour(s))   ECG 12 lead    Collection Time: 10/13/22  8:35 PM   Result Value Ref Range    Ventricular Rate 57 BPM    Atrial Rate 57 BPM    TX Interval 184 ms    QRSD Interval 132 ms    QT Interval 482 ms    QTC Interval 469 ms    P Axis 60 degrees    QRS Axis -48 degrees    T Wave Axis 77 degrees   CBC and differential    Collection Time: 10/13/22  8:43 PM   Result Value Ref Range    WBC 8 67 4 31 - 10 16 Thousand/uL    RBC 4 19 3 88 - 5 62 Million/uL    Hemoglobin 13 4 12 0 - 17 0 g/dL    Hematocrit 40 7 36 5 - 49 3 %    MCV 97 82 - 98 fL    MCH 32 0 26 8 - 34 3 pg    MCHC 32 9 31 4 - 37 4 g/dL    RDW 15 0 11 6 - 15 1 %    MPV 10 3 8 9 - 12 7 fL    Platelets 440 152 - 642 Thousands/uL    nRBC 0 /100 WBCs    Neutrophils Relative 48 43 - 75 %    Immat GRANS % 0 0 - 2 %    Lymphocytes Relative 37 14 - 44 %    Monocytes Relative 13 (H) 4 - 12 %    Eosinophils Relative 1 0 - 6 %    Basophils Relative 1 0 - 1 %    Neutrophils Absolute 4 24 1 85 - 7 62 Thousands/µL    Immature Grans Absolute 0 02 0 00 - 0 20 Thousand/uL    Lymphocytes Absolute 3 18 0 60 - 4 47 Thousands/µL    Monocytes Absolute 1 10 0 17 - 1 22 Thousand/µL    Eosinophils Absolute 0 08 0 00 - 0 61 Thousand/µL    Basophils Absolute 0 05 0 00 - 0 10 Thousands/µL   Comprehensive metabolic panel    Collection Time: 10/13/22  8:43 PM   Result Value Ref Range    Sodium 142 135 - 147 mmol/L    Potassium 3 9 3 5 - 5 3 mmol/L    Chloride 105 96 - 108 mmol/L    CO2 29 21 - 32 mmol/L    ANION GAP 8 4 - 13 mmol/L    BUN 14 5 - 25 mg/dL    Creatinine 0 91 0 60 - 1 30 mg/dL    Glucose 97 65 - 140 mg/dL    Calcium 9 4 8 4 - 10 2 mg/dL    AST 39 13 - 39 U/L    ALT 24 7 - 52 U/L    Alkaline Phosphatase 130 (H) 34 - 104 U/L    Total Protein 6 8 6 4 - 8 4 g/dL    Albumin 3 8 3 5 - 5 0 g/dL    Total Bilirubin 0 64 0 20 - 1 00 mg/dL    eGFR 81 ml/min/1 73sq m   Lipase    Collection Time: 10/13/22  8:43 PM   Result Value Ref Range    Lipase 22 11 - 82 u/L   HS Troponin 0hr (reflex protocol)    Collection Time: 10/13/22  8:43 PM   Result Value Ref Range    hs TnI 0hr 18 "Refer to ACS Flowchart"- see link ng/L   B-Type Natriuretic Peptide(BNP) AN, CA, EA Campuses Only    Collection Time: 10/13/22  8:43 PM   Result Value Ref Range    BNP 97 0 - 100 pg/mL   UA w Reflex to Microscopic w Reflex to Culture    Collection Time: 10/13/22 10:17 PM    Specimen: Urine, Clean Catch   Result Value Ref Range    Color, UA Light Yellow     Clarity, UA Clear     Specific Gravity, UA >=1 050 (H) 1 003 - 1 030    pH, UA 7 5 4 5, 5 0, 5 5, 6 0, 6 5, 7 0, 7 5, 8 0    Leukocytes, UA Negative Negative    Nitrite, UA Negative Negative    Protein, UA Trace (A) Negative mg/dl    Glucose, UA Negative Negative mg/dl    Ketones, UA Negative Negative mg/dl    Urobilinogen, UA <2 0 <2 0 mg/dl mg/dl    Bilirubin, UA Negative Negative    Occult Blood, UA Negative Negative   Urine Microscopic    Collection Time: 10/13/22 10:17 PM   Result Value Ref Range    RBC, UA 1-2 None Seen, 1-2 /hpf    WBC, UA 1-2 None Seen, 1-2 /hpf    Epithelial Cells None Seen None Seen, Occasional /hpf    Bacteria, UA None Seen None Seen, Occasional /hpf   HS Troponin I 2hr    Collection Time: 10/13/22 10:39 PM   Result Value Ref Range    hs TnI 2hr 20 "Refer to ACS Flowchart"- see link ng/L    Delta 2hr hsTnI 2 <20 ng/L   HS Troponin I 4hr    Collection Time: 10/14/22 12:39 AM   Result Value Ref Range    hs TnI 4hr 22 "Refer to ACS Flowchart"- see link ng/L    Delta 4hr hsTnI 4 <20 ng/L   Basic metabolic panel    Collection Time: 10/14/22  4:50 AM   Result Value Ref Range    Sodium 141 135 - 147 mmol/L    Potassium 4 5 3 5 - 5 3 mmol/L    Chloride 108 96 - 108 mmol/L    CO2 29 21 - 32 mmol/L    ANION GAP 4 4 - 13 mmol/L    BUN 12 5 - 25 mg/dL    Creatinine 0 80 0 60 - 1 30 mg/dL    Glucose 97 65 - 140 mg/dL    Calcium 8 9 8 4 - 10 2 mg/dL    eGFR 86 ml/min/1 73sq m   CBC (With Platelets)    Collection Time: 10/14/22  4:50 AM   Result Value Ref Range    WBC 8 07 4 31 - 10 16 Thousand/uL    RBC 3 93 3 88 - 5 62 Million/uL    Hemoglobin 12 4 12 0 - 17 0 g/dL    Hematocrit 38 7 36 5 - 49 3 %    MCV 99 (H) 82 - 98 fL    MCH 31 6 26 8 - 34 3 pg    MCHC 32 0 31 4 - 37 4 g/dL    RDW 15 0 11 6 - 15 1 %    Platelets 348 215 - 567 Thousands/uL    MPV 10 3 8 9 - 12 7 fL     Imaging: I have personally reviewed pertinent reports  EKG: NSR bifasicular block      Code Status: Level 1 - Full Code  Advance Directive and Living Will:      Power of :    POLST:      Counseling / Coordination of Care  Total floor / unit time spent today 45 minutes  Greater than 50% of total time was spent with the patient and / or family counseling and / or coordination of care

## 2022-10-14 NOTE — PLAN OF CARE
Problem: Potential for Falls  Goal: Patient will remain free of falls  Description: INTERVENTIONS:  - Educate patient/family on patient safety including physical limitations  - Instruct patient to call for assistance with activity   - Consult OT/PT to assist with strengthening/mobility   - Keep Call bell within reach  - Keep bed low and locked with side rails adjusted as appropriate  - Keep care items and personal belongings within reach  - Initiate and maintain comfort rounds  - Make Fall Risk Sign visible to staff  - Apply yellow socks and bracelet for high fall risk patients  - Consider moving patient to room near nurses station  Outcome: Progressing     Problem: Prexisting or High Potential for Compromised Skin Integrity  Goal: Skin integrity is maintained or improved  Description: INTERVENTIONS:  - Identify patients at risk for skin breakdown  - Assess and monitor skin integrity  - Assess and monitor nutrition and hydration status  - Monitor labs   - Assess for incontinence   - Turn and reposition patient  - Assist with mobility/ambulation  - Relieve pressure over bony prominences  - Avoid friction and shearing  - Provide appropriate hygiene as needed including keeping skin clean and dry  - Evaluate need for skin moisturizer/barrier cream  - Collaborate with interdisciplinary team   - Patient/family teaching  - Consider wound care consult   Outcome: Progressing     Problem: PAIN - ADULT  Goal: Verbalizes/displays adequate comfort level or baseline comfort level  Description: Interventions:  - Encourage patient to monitor pain and request assistance  - Assess pain using appropriate pain scale  - Administer analgesics based on type and severity of pain and evaluate response  - Implement non-pharmacological measures as appropriate and evaluate response  - Consider cultural and social influences on pain and pain management  - Notify physician/advanced practitioner if interventions unsuccessful or patient reports new pain  Outcome: Progressing     Problem: SAFETY ADULT  Goal: Patient will remain free of falls  Description: INTERVENTIONS:  - Educate patient/family on patient safety including physical limitations  - Instruct patient to call for assistance with activity   - Consult OT/PT to assist with strengthening/mobility   - Keep Call bell within reach  - Keep bed low and locked with side rails adjusted as appropriate  - Keep care items and personal belongings within reach  - Initiate and maintain comfort rounds  - Make Fall Risk Sign visible to staff  - Apply yellow socks and bracelet for high fall risk patients  - Consider moving patient to room near nurses station  Outcome: Progressing     Problem: CARDIOVASCULAR - ADULT  Goal: Maintains optimal cardiac output and hemodynamic stability  Description: INTERVENTIONS:  - Monitor I/O, vital signs and rhythm  - Monitor for S/S and trends of decreased cardiac output  - Administer and titrate ordered vasoactive medications to optimize hemodynamic stability  - Assess quality of pulses, skin color and temperature  - Assess for signs of decreased coronary artery perfusion  - Instruct patient to report change in severity of symptoms  Outcome: Progressing

## 2022-10-14 NOTE — ED NOTES
Patient changed into gown, repositioned  Pillow and warm blankets provided  Bedside table in reach along with call bell and urinal  Patient denies pain, no requests at this time  Encouraged patient to utilize call bell if anything is needed       Noemy Lam RN  10/14/22 0636

## 2022-10-14 NOTE — ASSESSMENT & PLAN NOTE
· History of chronic bradycardia with HR around 50  · Takes Toprol 12 5 (recently decreased from 25 mg)  · Has been asymptomatic  · HR dropped to 37 from time to time during the night, during the last admission it was around 45 and higher    Plan:    · Continue toprol 12 5mg per cardiology

## 2022-10-14 NOTE — ASSESSMENT & PLAN NOTE
· History of chronic bradycardia with HR around 50  · Takes Toprol 12 5 (recently decreased from 25 mg)  · Has been asymptomatic

## 2022-10-14 NOTE — ASSESSMENT & PLAN NOTE
· Continue home medications, is on Ramipril 5 mg BID (ordered as lisinopril) and Toprol-XL 12 5 mg daily    - Per cardiology, Toprol-XL is to be continued at 12 5 mg daily

## 2022-10-14 NOTE — ASSESSMENT & PLAN NOTE
· The patient came in to the hospital with chest pain while seating that was epigastric and went up to substernal region, nausea, diaphoresis, SOB and pallor, says that this time he feels like when he was having MI in the past   · History of MI-s, CAD, HLD, HTN, AAA  · EKG: No significant change from before, Sinus bradycardia  · POA labs: Initial Toponins 18-->20, BNP normal, otherwise unremarkable labs  · KATELYNN score is 3, Heart score 6 (Risk of MACE of 12-16 6%)  · Lipid panel was normal in 06/14/2022  · Echo from 06/14/2022 showed EF of 55% and Grade I diastolic dysfunction  · CTA chest abdomen pelvis: No evidence of aortic dissection or aneurysm  Dense atherosclerotic calcifications coronary arteries in keeping with atherosclerotic heart disease  Diffuse colonic diverticulosis without evidence of diverticulitis  Prostatomegaly  · Was supposed to get a stress test outpatient  · Has a history of hiatal hernia  · Differentials include cardiac vs musculoskeletal pain vs GI cause    Plan:  · Continue Aspirin, and Statin      - Pt remains without reciprocation of initial chest pain     - Suspect non cardiac etiology  - Pt has a hx of GERD and voices his pain is located epigastric    - Will try a PPI trial to see if tolerates well  - Per cardiology, they are comfortable with pt discharge today with outpatient follow up

## 2022-10-14 NOTE — H&P
[facility]  H&P- Pérez Inman 1945, 69 y o  male MRN: [id number]  Unit/Bed#: S -01 Encounter: [id number]  Primary Care Provider: Adri Julio MD   Date and time admitted to hospital: 10/13/2022  8:34 PM    * Chest pain  Assessment & Plan  · The patient came in to the hospital with chest pain while seating that was epigastric and went up to substernal region, nausea, diaphoresis, SOB and pallor, says that this time he feels like when he was having MI in the past   · History of MI-s, CAD, HLD, HTN, AAA  · EKG: No significant change from before, Sinus bradycardia  · POA labs: Initial Toponins 18-->20, BNP normal, otherwise unremarkable labs  · KATELYNN score is 3, Heart score 6 (Risk of MACE of 12-16 6%)  · Lipid panel was normal in 06/14/2022  · Echo from 06/14/2022 showed EF of 55% and Grade I diastolic dysfunction  · CTA chest abdomen pelvis: No evidence of aortic dissection or aneurysm  Dense atherosclerotic calcifications coronary arteries in keeping with atherosclerotic heart disease  Diffuse colonic diverticulosis without evidence of diverticulitis  Prostatomegaly    · Was supposed to get a stress test outpatient  · Has a history of hiatal hernia  · Differentials include cardiac vs musculoskeletal pain vs GI cause    Plan:  · Rule out cardiac cause  · Trend troponins  · Telemetry monitoring  · Continue Aspirin, and Statin  · Cardiology consulted    Shortness of breath  Assessment & Plan  · Had SOB, with diaphoresis and pallor  · CTA CAP: no acute finding in the lungs  · Patient on room air saturating well  · Started with pain and resolved after pain subsided  · Might be related to pain, doesn't seem volume overloaded, BNP normal    Plan:  · Continue monitoring  · Pain control   · Follow up with Covid/flu    Gastroesophageal reflux disease  Assessment & Plan  · Continue Pepcid    CAD S/P percutaneous coronary angioplasty  Assessment & Plan  · Patient had four stents s/p MI in 2003 and 2013  · Is on Aspirin, statin and Toprol  · Continue home medications    Dyslipidemia  Assessment & Plan  · Continue statin  · Last Lipid panel normal    Bradycardia  Assessment & Plan  · History of chronic bradycardia with HR around 50  · Takes Toprol 12 5 (recently decreased from 25 mg)  · Has been asymptomatic  · HR dropped to 37 from time to time during the night, during the last admission it was around 45 and higher    Plan:  · Monitor on telemetry  · Hold Toprol for now  · Cardiology consulted    Essential hypertension  Assessment & Plan  · Continue home medications, is on Ramipril 5 mg BID (ordered as lisinopril) and Toprol-XL 12 5 mg daily    VTE Pharmacologic Prophylaxis: VTE Score: 5 High Risk (Score >/= 5) - Pharmacological DVT Prophylaxis Ordered: enoxaparin (Lovenox)  Sequential Compression Devices Ordered  Code Status: Level 1 - Full Code   Discussion with family: Patient declined call to   Anticipated Length of Stay: Patient will be admitted on an observation basis with an anticipated length of stay of less than 2 midnights secondary to Chest pain  Chief Complaint: Chest pain, shortness of breath    History of Present Illness:  Berenice Laws is a 69 y o  male with a PMH of HTN, HLD, bradycardia with HR in around 46s, GERD, newly diagnosed AAA, CAD s/p stenting, MI history, hip replacement surgery, who presents with chest pain in the epigastric/substernal region, also nausea, diaphoresis, became short of breath as well  Patient has recent admission due to chest pain, and back then he didn't feel like he was having an MI, but this time he says the symptoms are similar to previous MI  In the ED he received morphine, zofran and aspirin  His shortness of breath, diaphoresis and nausea resolved  POA labs showed normal troponin, ECG didn't show any acute changes or ACS  During my assessment patient denied any complaints and said that he feels great after taking medications  Patient is being admitted for further cardiac workup and management  Review of Systems:  Review of Systems   Constitutional: Positive for diaphoresis  Negative for chills and fever  HENT: Negative for ear pain and sore throat  Eyes: Negative for pain and visual disturbance  Respiratory: Positive for shortness of breath  Negative for cough  Cardiovascular: Positive for chest pain and leg swelling  Negative for palpitations  Gastrointestinal: Positive for nausea  Negative for abdominal pain and vomiting  Genitourinary: Negative for dysuria and hematuria  Musculoskeletal: Negative for arthralgias and back pain  Skin: Positive for pallor  Negative for color change and rash  Neurological: Negative for seizures and syncope  All other systems reviewed and are negative  Past Medical and Surgical History:   Past Medical History:   Diagnosis Date   • Aortic aneurysm [facility])    • Cardiac disease    • Colon polyp    • Coronary artery disease    • Hypercholesteremia    • Hypertension    • Kidney stone    • Myocardial infarction ([facility] [address] )     5/04/2003: 2/8/2013       Past Surgical History:   Procedure Laterality Date   • CARDIAC SURGERY      patient reports having 4 stents placed   • COLONOSCOPY  07/2015    Due 7/2020   • COLONOSCOPY     • HERNIA REPAIR     • LITHOTRIPSY      x4   • OTHER SURGICAL HISTORY      stents   • TONSILLECTOMY     • UPPER GASTROINTESTINAL ENDOSCOPY         Meds/Allergies:  Prior to Admission medications    Medication Sig Start Date End Date Taking?  Authorizing Provider   allopurinol (ZYLOPRIM) 300 mg tablet Take 300 mg by mouth daily    Historical Provider, MD   aspirin 81 mg chewable tablet Chew 81 mg daily    Historical Provider, MD   atorvastatin (LIPITOR) 40 mg tablet Take 40 mg by mouth daily    Historical Provider, MD   cholecalciferol (VITAMIN D3) 1,000 units tablet Take 1,000 Units by mouth daily    Historical Provider, MD   Diclofenac Sodium (VOLTAREN) 1 % Apply 1 application topically 4 (four) times a day 12/3/21 12/3/22  Historical Provider, MD   famotidine (PEPCID) 20 mg tablet TAKE 1 TABLET BY MOUTH  TWICE DAILY 12/13/21   Halle Terry MD   metoprolol succinate (TOPROL-XL) 25 mg 24 hr tablet Take 0 5 tablets (12 5 mg total) by mouth daily 6/15/22   Jami De La Cruz MD   nitroglycerin (NITROSTAT) 0 4 mg SL tablet Place 1 tablet under the tongue every 5 (five) minutes as needed 7/1/20   Historical Provider, MD   ramipril (ALTACE) 5 mg capsule Take 5 mg by mouth 2 (two) times a day      Historical Provider, MD   senna-docusate sodium (SENOKOT S) 8 6-50 mg per tablet Take 1 tablet by mouth 2 (two) times a day 2/18/22 2/18/23  Historical Provider, MD MAGAÑA have reviewed home medications with patient personally  Allergies: No Known Allergies    Social History:  Marital Status: /Civil Union   Occupation: Unknown  Patient Pre-hospital Living Situation: Home  Patient Pre-hospital Level of Mobility: walks with walker  Patient Pre-hospital Diet Restrictions: None  Substance Use History:   Social History     Substance and Sexual Activity   Alcohol Use Not Currently     Social History     Tobacco Use   Smoking Status Former Smoker   • Years: 0 00   Smokeless Tobacco Never Used   Tobacco Comment    smoked in high school     Social History     Substance and Sexual Activity   Drug Use Never       Family History:  Family History   Problem Relation Age of Onset   • Heart disease Father    • Diabetes Paternal Uncle        Physical Exam:     Vitals:   Blood Pressure: 155/79 (10/14/22 0708)  Pulse: (!) 51 (10/14/22 0708)  Temperature: 98 2 °F (36 8 °C) (10/14/22 0708)  Temp Source: Oral (10/13/22 2031)  Respirations: 16 (10/14/22 0708)  Weight - Scale: 87 5 kg (193 lb) (10/14/22 0727)  SpO2: 97 % (10/14/22 0708)    Physical Exam  Vitals and nursing note reviewed  Constitutional:       General: He is not in acute distress  Appearance: He is well-developed   He is not ill-appearing  HENT:      Head: Normocephalic and atraumatic  Eyes:      Conjunctiva/sclera: Conjunctivae normal    Cardiovascular:      Rate and Rhythm: Regular rhythm  Bradycardia present  Pulmonary:      Effort: Pulmonary effort is normal  No respiratory distress  Breath sounds: Normal breath sounds  Abdominal:      Palpations: Abdomen is soft  Tenderness: There is no abdominal tenderness  There is no guarding  Musculoskeletal:      Cervical back: Normal range of motion and neck supple  No rigidity  Right lower leg: Edema (Chronic) present  Left lower leg: Edema (Chronic) present  Skin:     General: Skin is warm and dry  Coloration: Skin is not jaundiced  Neurological:      Mental Status: He is alert  Psychiatric:         Mood and Affect: Mood normal          Behavior: Behavior normal          Thought Content: Thought content normal          Judgment: Judgment normal       Comments: Patient is an awesome mood and feels much better          Additional Data:     Lab Results:  Results from last 7 days   Lab Units 10/14/22  0450 10/13/22  2043   WBC Thousand/uL 8 07 8 67   HEMOGLOBIN g/dL 12 4 13 4   HEMATOCRIT % 38 7 40 7   PLATELETS Thousands/uL 230 264   NEUTROS PCT %  --  48   LYMPHS PCT %  --  37   MONOS PCT %  --  13*   EOS PCT %  --  1     Results from last 7 days   Lab Units 10/14/22  0450 10/13/22  2043   SODIUM mmol/L 141 142   POTASSIUM mmol/L 4 5 3 9   CHLORIDE mmol/L 108 105   CO2 mmol/L 29 29   BUN mg/dL 12 14   CREATININE mg/dL 0 80 0 91   ANION GAP mmol/L 4 8   CALCIUM mg/dL 8 9 9 4   ALBUMIN g/dL  --  3 8   TOTAL BILIRUBIN mg/dL  --  0 64   ALK PHOS U/L  --  130*   ALT U/L  --  24   AST U/L  --  39   GLUCOSE RANDOM mg/dL 97 97                       Imaging: Reviewed radiology reports from this admission including: CTA CAP  CTA dissection protocol chest abdomen pelvis w wo contrast   Final Result by Linda Carranza DO (10/13 2131)      1    No evidence of aortic dissection or aneurysm  2   Dense atherosclerotic calcifications coronary arteries in keeping with atherosclerotic heart disease  3  Diffuse colonic diverticulosis without evidence of diverticulitis  4  Prostatomegaly  Additional incidental findings as above  Workstation performed: [id number]             EKG and Other Studies Reviewed on Admission:   · EKG: Sinus Bradycardia  HR 57     ** Please Note: This note has been constructed using a voice recognition system   **

## 2022-10-14 NOTE — DISCHARGE INSTR - AVS FIRST PAGE
Dear Rae Rinaldi,     It was our pleasure to care for you here at [facility]  It is our hope that we were always able to exceed the expected standards for your care during your stay  You were hospitalized due to chest pain  You were cared for on the floor by Burton Nieves under the service of Ashwini Valenzuela MD with the [facility] who covers for your primary care physician (PCP), Deb Patel MD, while you were hospitalized  If you have any questions or concerns related to this hospitalization, you may contact us at [phone]  For follow up as well as any medication refills, we recommend that you follow up with your primary care physician  A registered nurse will reach out to you by phone within a few days after your discharge to answer any additional questions that you may have after going home  However, at this time we provide for you here, the most important instructions / recommendations at discharge:     Notable Medication Adjustments -   Start taking Protonix 40mg once each day to help with reflux  Continue taking Toprol 12 5 mg (half a tablet) once each day  Testing Required after Discharge -   Be sure to get your cardiac stress test done  Important follow up information -   Follow up with your primary care provider and be sure to discuss any medication changes  Other Instructions -   Please return to the hospital if you have more chest pain  Please review this entire after visit summary as additional general instructions including medication list, appointments, activity, diet, any pertinent wound care, and other additional recommendations from your care team that may be provided for you        Sincerely,     Burton Nieves

## 2022-10-14 NOTE — ED PROVIDER NOTES
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 Taking?    Diclofenac Sodium (VOLTAREN) 1 %  Self Yes No   Sig: Apply 1 application topically 4 (four) times a day   allopurinol (ZYLOPRIM) 300 mg tablet  Self Yes No   Sig: Take 300 mg by mouth daily   aspirin 81 mg chewable tablet  Self Yes No   Sig: Chew 81 mg daily   atorvastatin (LIPITOR) 40 mg tablet  Self Yes No   Sig: Take 40 mg by mouth daily   cholecalciferol (VITAMIN D3) 1,000 units tablet  Self Yes No   Sig: Take 1,000 Units by mouth daily   famotidine (PEPCID) 20 mg tablet  Self No No   Sig: TAKE 1 TABLET BY MOUTH  TWICE DAILY   metoprolol succinate (TOPROL-XL) 25 mg 24 hr tablet   No No   Sig: Take 0 5 tablets (12 5 mg total) by mouth daily   nitroglycerin (NITROSTAT) 0 4 mg SL tablet  Self Yes No   Sig: Place 1 tablet under the tongue every 5 (five) minutes as needed   ramipril (ALTACE) 5 mg capsule  Self Yes No   Sig: Take 5 mg by mouth 2 (two) times a day     senna-docusate sodium (SENOKOT S) 8 6-50 mg per tablet  Self Yes No   Sig: Take 1 tablet by mouth 2 (two) times a day Facility-Administered Medications: None       Past Medical History:   Diagnosis Date   • Aortic aneurysm (HCC)    • Cardiac disease    • Colon polyp    • Coronary artery disease    • Hypercholesteremia    • Hypertension    • Kidney stone    • Myocardial infarction ([facility] [address] )     5/04/2003: 2/8/2013       Past Surgical History:   Procedure Laterality Date   • CARDIAC SURGERY      patient reports having 4 stents placed   • COLONOSCOPY  07/2015    Due 7/2020   • COLONOSCOPY     • HERNIA REPAIR     • LITHOTRIPSY      x4   • OTHER SURGICAL HISTORY      stents   • TONSILLECTOMY     • UPPER GASTROINTESTINAL ENDOSCOPY         Family History   Problem Relation Age of Onset   • Heart disease Father    • Diabetes Paternal Uncle      I have reviewed and agree with the history as documented  E-Cigarette/Vaping   • E-Cigarette Use Never User      E-Cigarette/Vaping Substances   • Nicotine No    • THC No    • CBD No    • Flavoring No    • Other No    • Unknown No      Social History     Tobacco Use   • Smoking status: Former Smoker     Years: 0 00   • Smokeless tobacco: Never Used   • Tobacco comment: smoked in high school   Vaping Use   • Vaping Use: Never used   Substance Use Topics   • Alcohol use: Not Currently   • Drug use: Never        Review of Systems   Constitutional: Negative for chills, diaphoresis and fever  Eyes: Negative for visual disturbance  Respiratory: Positive for chest tightness and shortness of breath  Negative for cough  Cardiovascular: Negative for chest pain, palpitations and leg swelling  Gastrointestinal: Positive for abdominal pain  Negative for constipation, diarrhea, nausea and vomiting  Endocrine: Negative for polyuria  Genitourinary: Negative for dysuria, frequency and urgency  Musculoskeletal: Negative for back pain  Skin: Positive for pallor  Neurological: Negative for weakness, numbness and headaches  All other systems reviewed and are negative        Physical Exam  ED Triage Vitals   Temperature Pulse Respirations Blood Pressure SpO2   10/13/22 2031 10/13/22 2031 10/13/22 2031 10/13/22 2031 10/13/22 2031   97 5 °F (36 4 °C) (!) 53 22 145/69 100 %      Temp Source Heart Rate Source Patient Position - Orthostatic VS BP Location FiO2 (%)   10/13/22 2031 10/13/22 2031 10/13/22 2031 10/13/22 2031 --   Oral Monitor Sitting Right arm       Pain Score       10/13/22 2100       No Pain             Orthostatic Vital Signs  Vitals:    10/13/22 2100 10/13/22 2130 10/13/22 2230 10/13/22 2330   BP: 154/80 162/85 158/73 133/64   Pulse: 64 (!) 52 (!) 48 (!) 47   Patient Position - Orthostatic VS:           Physical Exam  Vitals and nursing note reviewed  Constitutional:       General: He is in acute distress  Appearance: He is ill-appearing and diaphoretic  HENT:      Head: Normocephalic and atraumatic  Right Ear: External ear normal       Left Ear: External ear normal       Nose: Nose normal       Mouth/Throat:      Mouth: Mucous membranes are moist       Pharynx: Oropharynx is clear  Eyes:      General: No scleral icterus  Extraocular Movements: Extraocular movements intact  Conjunctiva/sclera: Conjunctivae normal       Pupils: Pupils are equal, round, and reactive to light  Cardiovascular:      Rate and Rhythm: Normal rate and regular rhythm  Pulses: Normal pulses  Radial pulses are 2+ on the right side and 2+ on the left side  Dorsalis pedis pulses are 2+ on the right side and 2+ on the left side  Posterior tibial pulses are 2+ on the right side and 2+ on the left side  Heart sounds:     Gallop present  S4 sounds present  Pulmonary:      Effort: Pulmonary effort is normal  No respiratory distress  Breath sounds: Normal breath sounds  No stridor  No wheezing, rhonchi or rales  Chest:      Chest wall: No mass, deformity, tenderness, crepitus or edema  Abdominal:      General: There is no distension        Palpations: Abdomen is soft  There is no mass  Tenderness: There is no abdominal tenderness  There is no right CVA tenderness, left CVA tenderness, guarding or rebound  Musculoskeletal:         General: No swelling, tenderness, deformity or signs of injury  Normal range of motion  Cervical back: Normal range of motion and neck supple  No tenderness  Right lower leg: No edema  Left lower leg: No edema  Skin:     General: Skin is warm  Capillary Refill: Capillary refill takes less than 2 seconds  Coloration: Skin is pale  Skin is not jaundiced  Neurological:      General: No focal deficit present  Mental Status: He is alert and oriented to person, place, and time     Psychiatric:         Mood and Affect: Mood normal          Behavior: Behavior normal          ED Medications  Medications   ondansetron (ZOFRAN) injection 4 mg (4 mg Intravenous Given 10/13/22 2046)   morphine injection 4 mg (4 mg Intravenous Given 10/13/22 2046)   iohexol (OMNIPAQUE) 350 MG/ML injection (SINGLE-DOSE) 100 mL (100 mL Intravenous Given 10/13/22 2100)   aspirin chewable tablet 324 mg (324 mg Oral Given 10/13/22 2255)       Diagnostic Studies  Results Reviewed     Procedure Component Value Units Date/Time    HS Troponin I 2hr [[id number]]  (Normal) Collected: 10/13/22 2239    Lab Status: Final result Specimen: Blood from Arm, Right Updated: 10/13/22 2315     hs TnI 2hr 20 ng/L      Delta 2hr hsTnI 2 ng/L     HS Troponin I 4hr [[id number]]     Lab Status: No result Specimen: Blood     Urine Microscopic [[id number]]  (Normal) Collected: 10/13/22 2217    Lab Status: Final result Specimen: Urine, Clean Catch Updated: 10/13/22 2240     RBC, UA 1-2 /hpf      WBC, UA 1-2 /hpf      Epithelial Cells None Seen /hpf      Bacteria, UA None Seen /hpf     UA w Reflex to Microscopic w Reflex to Culture [[id number]]  (Abnormal) Collected: 10/13/22 2217    Lab Status: Final result Specimen: Urine, Clean Catch Updated: 10/13/22 2237     Color, UA Light Yellow     Clarity, UA Clear     Specific Gravity, UA >=1 050     pH, UA 7 5     Leukocytes, UA Negative     Nitrite, UA Negative     Protein, UA Trace mg/dl      Glucose, UA Negative mg/dl      Ketones, UA Negative mg/dl      Urobilinogen, UA <2 0 mg/dl      Bilirubin, UA Negative     Occult Blood, UA Negative    B-Type Natriuretic Peptide(BNP) AN, CA, EA Campuses Only [[id number]]  (Normal) Collected: 10/13/22 2043    Lab Status: Final result Specimen: Blood from Arm, Right Updated: 10/13/22 2130     BNP 97 pg/mL     HS Troponin 0hr (reflex protocol) [[id number]]  (Normal) Collected: 10/13/22 2043    Lab Status: Final result Specimen: Blood from Arm, Right Updated: 10/13/22 2117     hs TnI 0hr 18 ng/L     Comprehensive metabolic panel [[id number]]  (Abnormal) Collected: 10/13/22 2043    Lab Status: Final result Specimen: Blood from Arm, Right Updated: 10/13/22 2110     Sodium 142 mmol/L      Potassium 3 9 mmol/L      Chloride 105 mmol/L      CO2 29 mmol/L      ANION GAP 8 mmol/L      BUN 14 mg/dL      Creatinine 0 91 mg/dL      Glucose 97 mg/dL      Calcium 9 4 mg/dL      AST 39 U/L      ALT 24 U/L      Alkaline Phosphatase 130 U/L      Total Protein 6 8 g/dL      Albumin 3 8 g/dL      Total Bilirubin 0 64 mg/dL      eGFR 81 ml/min/1 73sq m     Narrative:      [facility] guidelines for Chronic Kidney Disease (CKD):   •  Stage 1 with normal or high GFR (GFR > 90 mL/min/1 73 square meters)  •  Stage 2 Mild CKD (GFR = 60-89 mL/min/1 73 square meters)  •  Stage 3A Moderate CKD (GFR = 45-59 mL/min/1 73 square meters)  •  Stage 3B Moderate CKD (GFR = 30-44 mL/min/1 73 square meters)  •  Stage 4 Severe CKD (GFR = 15-29 mL/min/1 73 square meters)  •  Stage 5 End Stage CKD (GFR <15 mL/min/1 73 square meters)  Note: GFR calculation is accurate only with a steady state creatinine    Lipase [[id number]]  (Normal) Collected: 10/13/22 2043    Lab Status: Final result Specimen: Blood from Arm, Right Updated: 10/13/22 2110     Lipase 22 u/L     CBC and differential [[id number]]  (Abnormal) Collected: 10/13/22 2043    Lab Status: Final result Specimen: Blood from Arm, Right Updated: 10/13/22 2054     WBC 8 67 Thousand/uL      RBC 4 19 Million/uL      Hemoglobin 13 4 g/dL      Hematocrit 40 7 %      MCV 97 fL      MCH 32 0 pg      MCHC 32 9 g/dL      RDW 15 0 %      MPV 10 3 fL      Platelets 281 Thousands/uL      nRBC 0 /100 WBCs      Neutrophils Relative 48 %      Immat GRANS % 0 %      Lymphocytes Relative 37 %      Monocytes Relative 13 %      Eosinophils Relative 1 %      Basophils Relative 1 %      Neutrophils Absolute 4 24 Thousands/µL      Immature Grans Absolute 0 02 Thousand/uL      Lymphocytes Absolute 3 18 Thousands/µL      Monocytes Absolute 1 10 Thousand/µL      Eosinophils Absolute 0 08 Thousand/µL      Basophils Absolute 0 05 Thousands/µL     FLU/COVID - if FLU clinically relevant [[id number]] Collected: 10/13/22 2050    Lab Status: In process Specimen: Nares from Nose Updated: 10/13/22 2052                 CTA dissection protocol chest abdomen pelvis w wo contrast   Final Result by Henrietta Sun DO (10/13 2131)      1  No evidence of aortic dissection or aneurysm  2   Dense atherosclerotic calcifications coronary arteries in keeping with atherosclerotic heart disease  3  Diffuse colonic diverticulosis without evidence of diverticulitis  4  Prostatomegaly  Additional incidental findings as above  Workstation performed: [id number]               Procedures  ECG 12 Lead Documentation Only    Date/Time: 10/13/2022 10:35 PM  Performed by: Matilda May MD  Authorized by: Matilda May MD       EKG October 13, 2022 at 8:35 p m :  Bradycardia, rate of 57, normal WI interval, wide QRS interval, normal QT interval, , normal axis, pathologic Q-waves in V1 V2 with associated T-wave inversions, right bundle-branch block, LVH in aVL    Unchanged from previous EKG on June 13, 2022  ED Course             HEART Risk Score    Flowsheet Row Most Recent Value   Heart Score Risk Calculator    History 2 Filed at: 10/13/2022 2241   ECG 1 Filed at: 10/13/2022 2241   Age 2 Filed at: 10/13/2022 2241   Risk Factors 2 Filed at: 10/13/2022 2241   Troponin 1 Filed at: 10/13/2022 2241   HEART Score 8 Filed at: 10/13/2022 2241                        KATELYNN Risk Score    Flowsheet Row Most Recent Value   Age >= 72 1 Filed at: 10/13/2022 2305   Known CAD (stenosis >= 50%) 1 Filed at: 10/13/2022 2305   Recent (<=24 hrs) Service Angina 1 Filed at: 10/13/2022 2305   ST Deviation >= 0 5 mm --   3+ CAD Risk Factors (FHx, HTN, HLP, DM, Smoker) --   Aspirin Use Past 7 Days --   Elevated Cardiac Markers --   KATELYNN Risk Score (Calculated) --              MDM  Number of Diagnoses or Management Options  Chest pain, unspecified type  Diagnosis management comments: 44-year-old male presents with chest pain  History as above  Recent diagnosis of abdominal aortic aneurysm, concern for dissection, CT for dissection negative and unlikely at this time  Previous history of MIs, coronary artery disease, hypertension, hyperlipidemia, advanced age concerning for ACS or evolving unstable angina, heart score 8  Differential includes ACS, angina, gastritis, gastric reflux, cholecystitis, hiatal hernia, pneumothorax, perforated viscus  Will admit and to Internal Medicine for further cardiac workup in observation           Amount and/or Complexity of Data Reviewed  Clinical lab tests: ordered and reviewed  Tests in the radiology section of CPT®: ordered and reviewed  Tests in the medicine section of CPT®: ordered and reviewed  Decide to obtain previous medical records or to obtain history from someone other than the patient: yes  Obtain history from someone other than the patient: yes  Review and summarize past medical records: yes  Discuss the patient with other providers: yes  Independent visualization of images, tracings, or specimens: yes        Disposition  Final diagnoses:   Chest pain, unspecified type     Time reflects when diagnosis was documented in both MDM as applicable and the Disposition within this note     Time User Action Codes Description Comment    10/13/2022 11:14 PM Gi Chisholm Add [R07 9] Chest pain, unspecified type       ED Disposition     ED Disposition   Admit    Condition   Stable    Date/Time   Thu Oct 13, 2022 11:15 PM    Comment   Case was discussed with Dr Irma Daugherty and the patient's admission status was agreed to be Admission Status: observation status to the service of Dr Irma Daugherty   Follow-up Information    None         Patient's Medications   Discharge Prescriptions    No medications on file     No discharge procedures on file  PDMP Review     None           ED Provider  Attending physically available and evaluated Eryn Jenkins I managed the patient along with the ED Attending      Electronically Signed by         Angelina Louise MD  10/13/22 2985

## 2022-10-14 NOTE — ASSESSMENT & PLAN NOTE
· Had SOB, with diaphoresis and pallor  · CTA CAP: no acute finding in the lungs  · Patient on room air saturating well  · Started with pain and resolved after pain subsided  · Might be related to pain, doesn't seem volume overloaded, BNP normal    Plan:  · Continue monitoring  · Pain control   · Covid/flu negative

## 2022-10-14 NOTE — PROGRESS NOTES
[facility]  Progress Note - Suraj Sorensen 1945, 69 y o  male MRN: [id number]  Unit/Bed#: S -01 Encounter: [id number]  Primary Care Provider: Leonides Helton MD   Date and time admitted to hospital: 10/13/2022  8:34 PM    Shortness of breath  Assessment & Plan  · Had SOB, with diaphoresis and pallor  · CTA CAP: no acute finding in the lungs  · Patient on room air saturating well  · Started with pain and resolved after pain subsided  · Might be related to pain, doesn't seem volume overloaded, BNP normal    Plan:  · Continue monitoring  · Pain control   · Covid/flu negative    Gastroesophageal reflux disease  Assessment & Plan  · Continue Pepcid  · Will add PPI to regemin    CAD S/P percutaneous coronary angioplasty  Assessment & Plan  · Patient had four stents s/p MI in 2003 and 2013  · Is on Aspirin, statin and Toprol  · Continue home medications    Dyslipidemia  Assessment & Plan  · Continue statin  · Last Lipid panel normal    Bradycardia  Assessment & Plan  · History of chronic bradycardia with HR around 50  · Takes Toprol 12 5 (recently decreased from 25 mg)  · Has been asymptomatic  · HR dropped to 37 from time to time during the night, during the last admission it was around 45 and higher    Plan:    · Continue toprol 12 5mg per cardiology    Essential hypertension  Assessment & Plan  · Continue home medications, is on Ramipril 5 mg BID (ordered as lisinopril) and Toprol-XL 12 5 mg daily    - Per cardiology, Toprol-XL is to be continued at 12 5 mg daily     * Chest pain  Assessment & Plan  · The patient came in to the hospital with chest pain while seating that was epigastric and went up to substernal region, nausea, diaphoresis, SOB and pallor, says that this time he feels like when he was having MI in the past   · History of MI-s, CAD, HLD, HTN, AAA  · EKG: No significant change from before, Sinus bradycardia  · POA labs:  Initial Toponins 18-->20, BNP normal, otherwise unremarkable labs  · KATELYNN score is 3, Heart score 6 (Risk of MACE of 12-16 6%)  · Lipid panel was normal in 06/14/2022  · Echo from 06/14/2022 showed EF of 55% and Grade I diastolic dysfunction  · CTA chest abdomen pelvis: No evidence of aortic dissection or aneurysm  Dense atherosclerotic calcifications coronary arteries in keeping with atherosclerotic heart disease  Diffuse colonic diverticulosis without evidence of diverticulitis  Prostatomegaly  · Was supposed to get a stress test outpatient  · Has a history of hiatal hernia  · Differentials include cardiac vs musculoskeletal pain vs GI cause    Plan:  · Continue Aspirin, and Statin      - Pt remains without reciprocation of initial chest pain  - Suspect non cardiac etiology  - Pt has a hx of GERD and voices his pain is located epigastric    - Will try a PPI trial to see if tolerates well  - Per cardiology, they are comfortable with pt discharge today with outpatient follow up           Subjective/Objective     Subjective:   Pt examined at bedside this morning  He is well appearing and alert and oriented x 3  He is a pleasant gentleman and stated that he has had no recurrence of his presenting chest pain since being in the hospital overnight  He feels well and can ambulate to the restroom without difficulty or becoming short of breath or overly exerted  Upon further discussion the pt is questioning whether the pain may actually be from his GERD  We had a discussion where that could certainly be a possibility and discussed the benefit of adding a PPI to his regimen to try  The pt was agreeable to that plan  Awaiting final attestation and recommendations at this time from cardiology for final disposition  Objective:  Vitals: Blood pressure 155/79, pulse (!) 54, temperature 98 2 °F (36 8 °C), resp  rate 16, weight 87 5 kg (193 lb), SpO2 97 %  ,Body mass index is 28 5 kg/m²        Intake/Output Summary (Last 24 hours) at 10/14/2022 1252  Last data filed at 10/14/2022 0934  Gross per 24 hour   Intake 180 ml   Output 525 ml   Net -345 ml       Invasive Devices  Report    Peripheral Intravenous Line  Duration           Peripheral IV 10/13/22 Left Antecubital <1 day                Physical Exam: /79   Pulse (!) 54   Temp 98 2 °F (36 8 °C)   Resp 16   Wt 87 5 kg (193 lb)   SpO2 97%   BMI 28 50 kg/m²   General appearance: alert and oriented, in no acute distress  Lungs: clear to auscultation bilaterally  Heart: regular rate and rhythm, S1, S2 normal, no murmur, click, rub or gallop  Abdomen: soft, non-tender; bowel sounds normal; no masses,  no organomegaly  Extremities: extremities normal, warm and well-perfused; no cyanosis, clubbing, or edema  Pulses: 2+ and symmetric    Lab, Imaging and other studies: I have personally reviewed pertinent films in PACS  VTE Pharmacologic Prophylaxis: Lovenox  VTE Mechanical Prophylaxis: SCDs      Saad Patches, M4  Camelia Hernandez MD

## 2022-12-07 ENCOUNTER — APPOINTMENT (EMERGENCY)
Dept: RADIOLOGY | Facility: HOSPITAL | Age: 77
End: 2022-12-07

## 2022-12-07 ENCOUNTER — HOSPITAL ENCOUNTER (EMERGENCY)
Facility: HOSPITAL | Age: 77
Discharge: HOME | End: 2022-12-07
Attending: EMERGENCY MEDICINE

## 2022-12-07 ENCOUNTER — APPOINTMENT (EMERGENCY)
Dept: CT IMAGING | Facility: HOSPITAL | Age: 77
End: 2022-12-07

## 2022-12-07 VITALS
RESPIRATION RATE: 18 BRPM | TEMPERATURE: 97.6 F | SYSTOLIC BLOOD PRESSURE: 139 MMHG | OXYGEN SATURATION: 99 % | HEART RATE: 67 BPM | DIASTOLIC BLOOD PRESSURE: 60 MMHG

## 2022-12-07 DIAGNOSIS — S20.211A RIB CONTUSION, RIGHT, INITIAL ENCOUNTER: ICD-10-CM

## 2022-12-07 DIAGNOSIS — S00.83XA CONTUSION OF FOREHEAD, INITIAL ENCOUNTER: ICD-10-CM
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
## 2022-12-07 RX ORDER — KETOROLAC TROMETHAMINE 30 MG/ML
15 INJECTION, SOLUTION INTRAMUSCULAR; INTRAVENOUS ONCE
Status: COMPLETED | OUTPATIENT
Start: 2022-12-07 | End: 2022-12-07

## 2022-12-07 RX ORDER — LIDOCAINE 50 MG/G
1 PATCH TOPICAL ONCE
Status: DISCONTINUED | OUTPATIENT
Start: 2022-12-07 | End: 2022-12-07 | Stop reason: HOSPADM

## 2022-12-07 RX ORDER — NAPROXEN 500 MG/1
500 TABLET ORAL 2 TIMES DAILY WITH MEALS
Qty: 14 TABLET | Refills: 0 | Status: SHIPPED | OUTPATIENT
Start: 2022-12-07 | End: 2022-12-14

## 2022-12-07 RX ADMIN — LIDOCAINE 5% 1 PATCH: 700 PATCH TOPICAL at 13:36

## 2022-12-07 RX ADMIN — KETOROLAC TROMETHAMINE 15 MG: 30 INJECTION, SOLUTION INTRAMUSCULAR at 13:36

## 2022-12-07 NOTE — ED NOTES
Pt continues to complain of rib pain so DC held and lidoderm patch ordered     Sabina Álvarez, SERENE  12/07/22 5721

## 2022-12-07 NOTE — DISCHARGE INSTRUCTIONS
Please take naproxen as directed for pain  Make sure you take it with food  Take it at different time than your baby aspirin

## 2022-12-07 NOTE — ED PROVIDER NOTES
Emergency Department Trauma Note  Eryn Jenkins 69 y o  male MRN: [id number]  Unit/Bed#: ED-36/ED-36 Encounter: [id number]      Trauma Alert: Trauma Acuity: C  Model of Arrival: Mode of Arrival: Direct from scene via    Trauma Team: Current Providers  Attending Provider: Olamide Burrows DO  ED Technician: Erum Fiore  Registered Nurse: Shauna Baez RN  Registered Nurse: Tone Mckinley RN  Resident: Alan Garsia MD  Registered Nurse: Destiny Romo RN  Consultants:     None      History of Present Illness     Chief Complaint:   Chief Complaint   Patient presents with   • Fall     Pt arrives c/o trip and fall, + headstrike, denies LOC, on Aspirin  Abrasion noted on R forehead  Reports R posterior rib pain     HPI:  Eryn Jenkins is a 69 y o  male who presents with Fall at home  Mechanism:Details of Incident: pt got bumped in to and fell in to door fram and then to ground Injury Date: 12/07/22        15-year-old male, past medical history of cardiac disease, hypercholesterolemia presented to the ED after a fall at home hitting his head to the ground as well as the right side of his posterior rib to a door frame  Patient reports pain with inspiration in the posterior aspect of the right rib  There is ecchymosis on top of the right eyebrow, excoriation on the right knee  Patient denies loss of consciousness during the fall  Patient is on daily baby aspirin  He is neurologically intact  Review of Systems   Constitutional: Negative for chills and fever  HENT: Negative for ear pain and sore throat  Eyes: Negative for pain and visual disturbance  Respiratory: Negative for cough and shortness of breath  Patient report pain in the posterior aspect of the right ribs with inspiration  Cardiovascular: Negative for chest pain and palpitations  Gastrointestinal: Negative for abdominal pain and vomiting  Genitourinary: Negative for dysuria and hematuria     Musculoskeletal: Negative for arthralgias and back pain  Skin: Negative for color change and rash  Neurological: Negative for seizures and syncope  All other systems reviewed and are negative  Historical Information     Immunizations:   Immunization History   Administered Date(s) Administered   • COVID-19 PFIZER VACCINE 0 3 ML IM 02/15/2021, 03/08/2021       Past Medical History:   Diagnosis Date   • Aortic aneurysm (HCC)    • Cardiac disease    • Colon polyp    • Coronary artery disease    • Hypercholesteremia    • Hypertension    • Kidney stone    • Myocardial infarction ([facility] [address] )     5/04/2003: 2/8/2013       Family History   Problem Relation Age of Onset   • Heart disease Father    • Diabetes Paternal Uncle      Past Surgical History:   Procedure Laterality Date   • CARDIAC SURGERY      patient reports having 4 stents placed   • COLONOSCOPY  07/2015    Due 7/2020   • COLONOSCOPY     • HERNIA REPAIR     • LITHOTRIPSY      x4   • OTHER SURGICAL HISTORY      stents   • TONSILLECTOMY     • UPPER GASTROINTESTINAL ENDOSCOPY       Social History     Tobacco Use   • Smoking status: Former     Years: 0 00     Types: Cigarettes   • Smokeless tobacco: Never   • Tobacco comments:     smoked in high school   Vaping Use   • Vaping Use: Never used   Substance Use Topics   • Alcohol use: Not Currently   • Drug use: Never     E-Cigarette/Vaping   • E-Cigarette Use Never User      E-Cigarette/Vaping Substances   • Nicotine No    • THC No    • CBD No    • Flavoring No    • Other No    • Unknown No        Family History: non-contributory    Meds/Allergies   Prior to Admission Medications   Prescriptions Last Dose Informant Patient Reported? Taking?    Diclofenac Sodium (VOLTAREN) 1 %   Yes No   Sig: Apply 1 application topically 4 (four) times a day   Patient not taking: Reported on 10/14/2022   allopurinol (ZYLOPRIM) 300 mg tablet   Yes No   Sig: Take 300 mg by mouth daily   aspirin 81 mg chewable tablet   Yes No   Sig: Chew 81 mg daily atorvastatin (LIPITOR) 40 mg tablet   Yes No   Sig: Take 40 mg by mouth daily   cholecalciferol (VITAMIN D3) 1,000 units tablet   Yes No   Sig: Take 1,000 Units by mouth daily   famotidine (PEPCID) 20 mg tablet   No No   Sig: TAKE 1 TABLET BY MOUTH  TWICE DAILY   metoprolol succinate (TOPROL-XL) 25 mg 24 hr tablet   No No   Sig: Take 0 5 tablets (12 5 mg total) by mouth daily   nitroglycerin (NITROSTAT) 0 4 mg SL tablet   Yes No   Sig: Place 1 tablet under the tongue every 5 (five) minutes as needed   pantoprazole (PROTONIX) 40 mg tablet   No No   Sig: Take 1 tablet (40 mg total) by mouth daily   ramipril (ALTACE) 5 mg capsule   Yes No   Sig: Take 5 mg by mouth 2 (two) times a day     senna-docusate sodium (SENOKOT S) 8 6-50 mg per tablet   Yes No   Sig: Take 1 tablet by mouth 2 (two) times a day   Patient not taking: Reported on 10/14/2022      Facility-Administered Medications: None       No Known Allergies    PHYSICAL EXAM    PE limited by: None    Objective   Vitals:   First set: Temperature: 97 6 °F (36 4 °C) (12/07/22 1208)  Pulse: 77 (12/07/22 1208)  Respirations: 20 (12/07/22 1208)  Blood Pressure: 120/76 (12/07/22 1208)  SpO2: 97 % (12/07/22 1208)    Primary Survey:   (A) Airway: intact  (B) Breathing: normal  (C) Circulation: Pulses:   normal  (D) Disabliity:  GCS Total:  15  (E) Expose:  Completed    Secondary Survey: (Click on Physical Exam tab above)  Physical Exam  Vitals and nursing note reviewed  Constitutional:       General: He is not in acute distress  Appearance: He is well-developed  HENT:      Head: Normocephalic and atraumatic  Comments: Ecchymosis present  Eyes:      Conjunctiva/sclera: Conjunctivae normal    Cardiovascular:      Rate and Rhythm: Normal rate and regular rhythm  Heart sounds: No murmur heard  Pulmonary:      Effort: Pulmonary effort is normal  No respiratory distress  Breath sounds: Normal breath sounds     Chest:      Chest wall: Tenderness ( of the right ribs ) present  Abdominal:      Palpations: Abdomen is soft  Tenderness: There is no abdominal tenderness  Musculoskeletal:         General: No swelling  Arms:       Cervical back: Neck supple  Legs:       Comments: Excoriation present on the right knee  Skin:     General: Skin is warm and dry  Capillary Refill: Capillary refill takes less than 2 seconds  Neurological:      Mental Status: He is alert  Psychiatric:         Mood and Affect: Mood normal          Cervical spine cleared by clinical criteria? N/A     Invasive Devices     None                 Lab Results:   Results Reviewed     None                 Imaging Studies:   Direct to CT: Yes  XR chest 2 views   ED Interpretation by Sukhjinder Ross MD (12/07 1307)   Unremarkable chest x-ray  No sign of acute fracture or pneumothorax noted  Final Result by Trini Robles MD (12/07 1329)      No acute cardiopulmonary disease  Workstation performed: [id number]         CT head without contrast   Final Result by Annalee Kapadia MD (12/07 1300)      No acute intracranial abnormality  Workstation performed: [id number]               Procedures  Procedures         ED Course  ED Course as of 12/07/22 2134   Wed Dec 07, 2022   1307 Patient presented to the ED after he sustained a fall and hit his head and the right side of his posterior chest   No LOC  CT head unremarkable, Chest x-ray showed no sign of broken ribs or pneumothorax  Patient will be discharged home with pain med and follow-up with his PCP    2132 Patient given lidocaine patch and an injection of ketorolac in the ED as well as a 7-days course of naproxen for pain  Patient presented after a fall at home hitting his head to the ground as well as bumping his right-sided posterior rib to the door          MDM  Number of Diagnoses or Management Options  Contusion of forehead, initial encounter  Fall  Rib contusion, right, initial encounter  Diagnosis management comments: Patient presented after a fall with a head strike as well as a right-sided rib strike no LOC  CT scan of the head as well as checks x-ray showed no signs of bleed or broken rib or pneumothorax  Patient was given lidocaine patch and ketorolac injection in the ED as well as a 7-days course prescription of naproxen for pain  Return precautions discussed  Disposition  [facility] Transfer: No  Final diagnoses:   Fall   Contusion of forehead, initial encounter   Rib contusion, right, initial encounter     Time reflects when diagnosis was documented in both MDM as applicable and the Disposition within this note     Time User Action Codes Description Comment    12/7/2022  1:09 PM [employer] Add [O80  XXXA] Fall     12/7/2022  1:10 PM Shittou, Jayleen-Aziz Ike Add [S00 91XA] Abrasion of head, initial encounter     12/7/2022  1:10 PM Shittou, [employer]leen[employer] Remove [S00 91XA] Abrasion of head, initial encounter     12/7/2022  1:10 PM Shittou, [employer] Add [S00 83XA] Contusion of forehead, initial encounter     12/7/2022  1:12 PM Shittou, Jayleen[employer]iz [employer] Add [S20 211A] Rib contusion, right, initial encounter     12/7/2022  1:17 PM Shittpaul, [employer] [O45  XXXA] Fall     12/7/2022  1:17 PM Shittou, Jayleen-Aziz Ike Modify [S00 83XA] Contusion of forehead, initial encounter     12/7/2022  1:17 PM Shittou, Jayleen-Aziz Ike Modify [S20 211A] Rib contusion, right, initial encounter     12/7/2022  1:17 PM [employer] [Q35  XXXA] Fall     12/7/2022  1:17 PM [employer] Modify [W83 687S] Rib contusion, right, initial encounter       ED Disposition     ED Disposition   Discharge    Condition   Stable    Date/Time   Wed Dec 7, 2022  3:36 PM    Comment   Unknown Kipper discharge to home/self care                 Follow-up Information     Follow up With Specialties Details Why Contact Info    Christal Mullins MD Internal Medicine  As needed [address] [place] [address]  [phone]          Discharge Medication List as of 12/7/2022  1:18 PM      START taking these medications    Details   naproxen (Naprosyn) 500 mg tablet Take 1 tablet (500 mg total) by mouth 2 (two) times a day with meals for 7 days, Starting Wed 12/7/2022, Until Wed 12/14/2022, Normal         CONTINUE these medications which have NOT CHANGED    Details   allopurinol (ZYLOPRIM) 300 mg tablet Take 300 mg by mouth daily, Historical Med      aspirin 81 mg chewable tablet Chew 81 mg daily, Historical Med      atorvastatin (LIPITOR) 40 mg tablet Take 40 mg by mouth daily, Historical Med      cholecalciferol (VITAMIN D3) 1,000 units tablet Take 1,000 Units by mouth daily, Historical Med      Diclofenac Sodium (VOLTAREN) 1 % Apply 1 application topically 4 (four) times a day, Starting Fri 12/3/2021, Until Sat 12/3/2022, Historical Med      famotidine (PEPCID) 20 mg tablet TAKE 1 TABLET BY MOUTH  TWICE DAILY, Normal      metoprolol succinate (TOPROL-XL) 25 mg 24 hr tablet Take 0 5 tablets (12 5 mg total) by mouth daily, Starting Fri 10/14/2022, Normal      nitroglycerin (NITROSTAT) 0 4 mg SL tablet Place 1 tablet under the tongue every 5 (five) minutes as needed, Starting Wed 7/1/2020, Historical Med      pantoprazole (PROTONIX) 40 mg tablet Take 1 tablet (40 mg total) by mouth daily, Starting Fri 10/14/2022, Normal      ramipril (ALTACE) 5 mg capsule Take 5 mg by mouth 2 (two) times a day  , Historical Med      senna-docusate sodium (SENOKOT S) 8 6-50 mg per tablet Take 1 tablet by mouth 2 (two) times a day, Starting Fri 2/18/2022, Until Sat 2/18/2023, Historical Med           No discharge procedures on file      PDMP Review     None          ED Provider  Electronically Signed by         Sana Walls MD  12/07/22 6996

## 2023-01-05 ENCOUNTER — OFFICE VISIT (OUTPATIENT)
Dept: CARDIOLOGY CLINIC | Facility: CLINIC | Age: 78
End: 2023-01-05

## 2023-01-05 VITALS
DIASTOLIC BLOOD PRESSURE: 80 MMHG | SYSTOLIC BLOOD PRESSURE: 144 MMHG | TEMPERATURE: 97.7 F | HEART RATE: 68 BPM | OXYGEN SATURATION: 98 % | WEIGHT: 183 LBS | BODY MASS INDEX: 27.11 KG/M2 | HEIGHT: 69 IN

## 2023-01-05 DIAGNOSIS — Z98.61 HISTORY OF PTCA: ICD-10-CM

## 2023-01-05 DIAGNOSIS — I25.10 CORONARY ARTERY DISEASE INVOLVING NATIVE CORONARY ARTERY OF NATIVE HEART WITHOUT ANGINA PECTORIS: ICD-10-CM

## 2023-01-05 DIAGNOSIS — I10 ESSENTIAL HYPERTENSION: Chronic | ICD-10-CM

## 2023-01-05 DIAGNOSIS — Z98.61 CAD S/P PERCUTANEOUS CORONARY ANGIOPLASTY: Primary | ICD-10-CM

## 2023-01-05 DIAGNOSIS — I25.10 CAD S/P PERCUTANEOUS CORONARY ANGIOPLASTY: Primary | ICD-10-CM

## 2023-01-05 DIAGNOSIS — R00.1 BRADYCARDIA: ICD-10-CM

## 2023-01-05 RX ORDER — ASPIRIN 81 MG/1
TABLET, COATED ORAL
COMMUNITY
Start: 2022-11-19 | End: 2023-01-05 | Stop reason: SDUPTHER

## 2023-01-05 RX ORDER — ACETAMINOPHEN AND CODEINE PHOSPHATE 300; 30 MG/1; MG/1
TABLET ORAL
COMMUNITY
Start: 2022-12-09

## 2023-01-05 RX ORDER — ASPIRIN 81 MG/1
81 TABLET, COATED ORAL DAILY
Qty: 90 TABLET | Refills: 3 | Status: SHIPPED | OUTPATIENT
Start: 2023-01-05

## 2023-01-05 RX ORDER — METOPROLOL SUCCINATE 25 MG/1
12.5 TABLET, EXTENDED RELEASE ORAL DAILY
Qty: 60 TABLET | Refills: 2 | Status: SHIPPED | OUTPATIENT
Start: 2023-01-05

## 2023-01-05 NOTE — PATIENT INSTRUCTIONS
You were seen today in the Cardiology office for follow up  Please continue all your cardiac medications as prescribed  I have sent refills for your aspirin and metoprolol  Please call our office or use [employer] with any questions  negative - no polyuria, no polydipsia
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
## 2023-01-18 ENCOUNTER — APPOINTMENT (OUTPATIENT)
Dept: LAB | Facility: CLINIC | Age: 78
End: 2023-01-18

## 2023-01-18 DIAGNOSIS — C61 PROSTATE CANCER (HCC): ICD-10-CM

## 2023-01-18 LAB — PSA SERPL-MCNC: 4.5 NG/ML (ref 0–4)

## 2023-01-30 ENCOUNTER — OFFICE VISIT (OUTPATIENT)
Dept: PODIATRY | Facility: CLINIC | Age: 78
End: 2023-01-30

## 2023-01-30 VITALS
HEIGHT: 69 IN | BODY MASS INDEX: 27.11 KG/M2 | HEART RATE: 59 BPM | DIASTOLIC BLOOD PRESSURE: 82 MMHG | WEIGHT: 183 LBS | OXYGEN SATURATION: 97 % | SYSTOLIC BLOOD PRESSURE: 148 MMHG

## 2023-01-30 DIAGNOSIS — L84 CORNS: ICD-10-CM

## 2023-01-30 DIAGNOSIS — B35.1 ONYCHOMYCOSIS: Primary | ICD-10-CM

## 2023-01-30 NOTE — PROGRESS NOTES
Assessment/Plan:    The patient's clinical examination today is consistent with onychomycosis of the pedal nail plates  There are some tender callosities to the medial aspect of the great toes bilaterally  The pedal nail plates were sharply debrided with a pair of sterile nail clippers and mechanically reduced in thickness and girth utilizing a rotary bur x10  The callus lesions to the great toes bilaterally were debrided with a sterile #15 blade without incident  There were no other acute pedal issues  Recommend follow-up on as-needed basis  Diagnoses and all orders for this visit:    Onychomycosis    Corns          Subjective:      Patient ID: Benjamin Dawson is a 69 y o  male  The patient presents today for his initial consultation with [facility]'s podiatry group with a chief complaint of painful and thickened pedal nail plates and calluses to both feet  The patient states that he was being seen by podiatrist every 3 months at [facility] up until his last visit at which time they told him that they were unable to continue with palliative care for him  He recently found our office when he was bringing his wife to the [address] [place] [address],[facility] Floor for another appointment        The following portions of the patient's history were reviewed and updated as appropriate: allergies, current medications, past family history, past medical history, past social history, past surgical history and problem list       PAST MEDICAL HISTORY:  Past Medical History:   Diagnosis Date   • Aortic aneurysm ([facility] )    • Cardiac disease    • Colon polyp    • Coronary artery disease    • Hypercholesteremia    • Hypertension    • Kidney stone    • Myocardial infarction ([facility] [address] )     5/04/2003: 2/8/2013       PAST SURGICAL HISTORY:  Past Surgical History:   Procedure Laterality Date   • CARDIAC SURGERY      patient reports having 4 stents placed   • COLONOSCOPY  07/2015    Due 7/2020   • COLONOSCOPY     • HERNIA REPAIR     • LITHOTRIPSY      x4   • OTHER SURGICAL HISTORY      stents   • TONSILLECTOMY     • UPPER GASTROINTESTINAL ENDOSCOPY          ALLERGIES:  Patient has no known allergies  MEDICATIONS:  Current Outpatient Medications   Medication Sig Dispense Refill   • acetaminophen-codeine (TYLENOL #3) 300-30 mg per tablet TAKE 1 TO 2 TABLETS BY MOUTH EVERY 4 HOURS AS NEEDED FOR MODERATE PAIN     • allopurinol (ZYLOPRIM) 300 mg tablet Take 300 mg by mouth daily     • Aspirin Low Dose 81 MG EC tablet Take 1 tablet (81 mg total) by mouth daily 90 tablet 3   • atorvastatin (LIPITOR) 40 mg tablet Take 40 mg by mouth daily     • cholecalciferol (VITAMIN D3) 1,000 units tablet Take 1,000 Units by mouth daily     • famotidine (PEPCID) 20 mg tablet TAKE 1 TABLET BY MOUTH  TWICE DAILY 180 tablet 3   • metoprolol succinate (TOPROL-XL) 25 mg 24 hr tablet Take 0 5 tablets (12 5 mg total) by mouth daily 60 tablet 2   • nitroglycerin (NITROSTAT) 0 4 mg SL tablet Place 1 tablet under the tongue every 5 (five) minutes as needed     • ramipril (ALTACE) 5 mg capsule Take 5 mg by mouth 2 (two) times a day       • Diclofenac Sodium (VOLTAREN) 1 % Apply 1 application topically 4 (four) times a day (Patient not taking: Reported on 10/14/2022)     • naproxen (Naprosyn) 500 mg tablet Take 1 tablet (500 mg total) by mouth 2 (two) times a day with meals for 7 days 14 tablet 0   • pantoprazole (PROTONIX) 40 mg tablet Take 1 tablet (40 mg total) by mouth daily (Patient not taking: Reported on 1/5/2023) 30 tablet 0   • senna-docusate sodium (SENOKOT S) 8 6-50 mg per tablet Take 1 tablet by mouth 2 (two) times a day (Patient not taking: Reported on 10/14/2022)       No current facility-administered medications for this visit         SOCIAL HISTORY:  Social History     Socioeconomic History   • Marital status: /Civil Union     Spouse name: None   • Number of children: None   • Years of education: None   • Highest education level: None   Occupational History   • None   Tobacco Use   • Smoking status: Former     Years: 0 00     Types: Cigarettes   • Smokeless tobacco: Never   • Tobacco comments:     smoked in high school   Vaping Use   • Vaping Use: Never used   Substance and Sexual Activity   • Alcohol use: Not Currently   • Drug use: Never   • Sexual activity: Not Currently     Partners: Female   Other Topics Concern   • None   Social History Narrative   • None     Social Determinants of Health     Financial Resource Strain: Not on file   Food Insecurity: Not on file   Transportation Needs: Not on file   Physical Activity: Not on file   Stress: Not on file   Social Connections: Not on file   Intimate Partner Violence: Not on file   Housing Stability: Not on file        Review of Systems   Constitutional: Negative  HENT: Negative  Eyes: Negative  Respiratory: Negative  Cardiovascular: Negative  Endocrine: Negative  Musculoskeletal: Negative  Neurological: Negative  Hematological: Negative  Psychiatric/Behavioral: Negative  Objective:      /82 (BP Location: Right arm, Patient Position: Sitting, Cuff Size: Standard)   Pulse 59   Ht 5' 9" (1 753 m)   Wt 83 kg (183 lb)   SpO2 97%   BMI 27 02 kg/m²          Physical Exam  Vitals reviewed  Constitutional:       Appearance: Normal appearance  HENT:      Head: Normocephalic and atraumatic  Nose: Nose normal    Eyes:      Conjunctiva/sclera: Conjunctivae normal       Pupils: Pupils are equal, round, and reactive to light  Cardiovascular:      Pulses:           Dorsalis pedis pulses are 2+ on the right side and 2+ on the left side  Posterior tibial pulses are 1+ on the right side and 1+ on the left side  Pulmonary:      Effort: Pulmonary effort is normal    Feet:      Right foot:      Skin integrity: Callus and dry skin present  Toenail Condition: Right toenails are abnormally thick and long  Fungal disease present       Left foot:      Skin integrity: Callus and dry skin present  Toenail Condition: Left toenails are abnormally thick and long  Fungal disease present  Comments: The pedal nail plates are thickened and dystrophic with subungual debris and discoloration consistent with onychomycosis; there is tenderness with palpation of the bilateral hallucal nail plates, they are also tender close toed shoe gear; laxity is are noted to the medial aspect of the hallux bilaterally as well as the distal aspects of the right second and third toes; there is dry scaling skin bilaterally; the interdigital spaces are clear and without maceration  Skin:     General: Skin is warm  Capillary Refill: Capillary refill takes less than 2 seconds  Neurological:      General: No focal deficit present  Mental Status: He is alert and oriented to person, place, and time  Psychiatric:         Mood and Affect: Mood normal          Behavior: Behavior normal          Thought Content:  Thought content normal

## 2023-02-10 ENCOUNTER — OFFICE VISIT (OUTPATIENT)
Dept: OBGYN CLINIC | Facility: CLINIC | Age: 78
End: 2023-02-10

## 2023-02-10 ENCOUNTER — APPOINTMENT (OUTPATIENT)
Dept: RADIOLOGY | Facility: AMBULARY SURGERY CENTER | Age: 78
End: 2023-02-10

## 2023-02-10 VITALS
HEART RATE: 61 BPM | WEIGHT: 183 LBS | DIASTOLIC BLOOD PRESSURE: 81 MMHG | HEIGHT: 69 IN | BODY MASS INDEX: 27.11 KG/M2 | SYSTOLIC BLOOD PRESSURE: 159 MMHG

## 2023-02-10 DIAGNOSIS — S22.000A COMPRESSION FRACTURE OF BODY OF THORACIC VERTEBRA (HCC): Primary | ICD-10-CM

## 2023-02-10 DIAGNOSIS — G89.29 CHRONIC BILATERAL LOW BACK PAIN WITHOUT SCIATICA: ICD-10-CM

## 2023-02-10 DIAGNOSIS — M54.50 CHRONIC BILATERAL LOW BACK PAIN WITHOUT SCIATICA: ICD-10-CM

## 2023-02-10 NOTE — PROGRESS NOTES
1  Compression fracture of body of thoracic vertebra (HCC)        2  Chronic bilateral low back pain without sciatica  XR spine lumbar 2 or 3 views injury    XR spine thoracic 2 vw        Orders Placed This Encounter   Procedures   • XR spine lumbar 2 or 3 views injury   • XR spine thoracic 2 vw        Impression:  Thoracolumbar pain that is multifactorial but predominantly due to mild compression deformity of the lower thoracic spine  There are no concerning neurological deficits  We discussed different treatment options and decided to proceed with a thoracolumbosacral orthosis that he will wear when out of bed  I will see him back in 4 weeks to reassess  In the interim, he will try topical medications that we discussed  If symptoms worsened any, would obtain an MRI of his thoracic spine  No follow-ups on file  Patient is in agreement with the above plan  Imaging Studies (I personally reviewed images in PACS and report if it was available):  Thoracic spine x-rays that are most recent to this encounter were reviewed  These images show minimal compression deformities of the T10 and T11 vertebral bodies which are likely acute due to his physical examination  There are flowing anterior osteophytes  Lumbar spine x-rays show grade 1 anterolisthesis of L4 on L5  There is mild lower lumbar facet hypertrophy  The disc spaces are preserved  HPI:  Benjamin Dawson is a 69 y o  male  who presents for evaluation of   Chief Complaint   Patient presents with   • Lower Back - Pain     Pt presents today with complaints of lower back pain after suffering a fall 3 weeks ago  He was walking up the steps at home and fell, landing flat on his back  Pt states he initially had pain in his lower back/tailbone region, but pain is now traveling up his spine  Onset/Mechanism: Started about 20 days ago after he fell onto his mid and low back  Location: Mid back mostly and also into the low back    Radiation: This pain into his extremities  Quality: Full pressure  Provocative: Physical activity  Severity: 8 out of 10 at its worst and currently a 7 out of 10  Associated Symptoms: Denies numbness, tingling or weakness in his extremities  Treatment so far: No treatment  No red flag symptoms including fever/chills, unintentional weight change, bowel/bladder incontinence, scissoring gait, personal/family history of cancer, pain worse at night, intravenous drug abuse or trauma  Following history reviewed and updated:  Past Medical History:   Diagnosis Date   • Aortic aneurysm ([facility] )    • Cardiac disease    • Colon polyp    • Coronary artery disease    • Hypercholesteremia    • Hypertension    • Kidney stone    • Myocardial infarction ([facility] )     5/04/2003: 2/8/2013     Past Surgical History:   Procedure Laterality Date   • CARDIAC SURGERY      patient reports having 4 stents placed   • COLONOSCOPY  07/2015    Due 7/2020   • COLONOSCOPY     • HERNIA REPAIR     • LITHOTRIPSY      x4   • OTHER SURGICAL HISTORY      stents   • TONSILLECTOMY     • UPPER GASTROINTESTINAL ENDOSCOPY       Social History   Social History     Substance and Sexual Activity   Alcohol Use Not Currently     Social History     Substance and Sexual Activity   Drug Use Never     Social History     Tobacco Use   Smoking Status Former   • Years: 0 00   • Types: Cigarettes   Smokeless Tobacco Never   Tobacco Comments    smoked in high school     Family History   Problem Relation Age of Onset   • Heart disease Father    • Diabetes Paternal Uncle      No Known Allergies     Constitutional:  /81   Pulse 61   Ht 5' 9" (1 753 m)   Wt 83 kg (183 lb)   BMI 27 02 kg/m²    General: NAD  Eyes: Anicteric sclerae  Neck: Supple  Lungs: Unlabored breathing  Cardiovascular: No lower extremity edema  Skin: Intact without erythema  Neurologic: Sensation intact to light touch  Psychiatric: Mood and affect are appropriate      Orthopedic Examination  • Inspection: No obvious deformities, lesions or rashes  • ROM: Forward flexed posture with limited extension range of motion  • Palpation: Visibly tender to palpation along the T9-T12 midline spine  There are no step offs  • Neuro: Bilateral extremity strength is normal and symmetric  No muscle atrophy or abnormal tone  Bilateral extremity muscle stretch reflexes are physiologic and symmetric   No myelopathic signs  Sensation to light touch is intact throughout  Neural compression testing: Normal bilateral SLR/dural stretch  Positive bilateral Bell's maneuver  Gait is slowed with a shortened step/stride length and decreased floresita  He uses a rolling walker for ambulation assistance  No scissoring      Procedures

## 2023-02-10 NOTE — PATIENT INSTRUCTIONS
You can obtain diclofenac cream/gel/ointment over the counter  Many brands make this medication and they include Voltaren, Aspercreme and Aleve  You can use this up to 3 times per day  It is best to wash the area with water, pat dry and then apply  The cream absorbs better after this  Be careful not to let any pets or children get in contact with the medication as it can be harmful to them  If you develop a skin reaction, stop using the cream      Over-the-counter salonpas patches can be applied to the area of discomfort to help with pain  There are two types of patches; one contains lidocaine 4% and the other contains menthol (and sometimes camphor and methyl salicylate)  You can try one or the other and see which one works best for you

## 2023-02-13 ENCOUNTER — TELEPHONE (OUTPATIENT)
Dept: OBGYN CLINIC | Facility: HOSPITAL | Age: 78
End: 2023-02-13

## 2023-02-13 NOTE — TELEPHONE ENCOUNTER
Caller: Patient    Doctor: Nas Braun    Reason for call:  Can the patient wear his back brace to bed? It makes him feel better  Please advise      Call back#: [phone]

## 2023-03-13 ENCOUNTER — OFFICE VISIT (OUTPATIENT)
Dept: OBGYN CLINIC | Facility: CLINIC | Age: 78
End: 2023-03-13

## 2023-03-13 VITALS
BODY MASS INDEX: 27.11 KG/M2 | SYSTOLIC BLOOD PRESSURE: 162 MMHG | HEIGHT: 69 IN | WEIGHT: 183 LBS | HEART RATE: 72 BPM | DIASTOLIC BLOOD PRESSURE: 80 MMHG

## 2023-03-13 DIAGNOSIS — M54.50 CHRONIC BILATERAL LOW BACK PAIN WITHOUT SCIATICA: Primary | ICD-10-CM

## 2023-03-13 DIAGNOSIS — G89.29 CHRONIC BILATERAL LOW BACK PAIN WITHOUT SCIATICA: Primary | ICD-10-CM

## 2023-03-13 NOTE — PROGRESS NOTES
1  Chronic bilateral low back pain without sciatica  Ambulatory referral to Physical Therapy        Orders Placed This Encounter   Procedures   • Ambulatory referral to Physical Therapy     Impression:  Patient is here in follow up of thoracolumbar pain that is multifactorial but predominantly due to mild compression deformity of the lower thoracic spine  There are no concerning neurological deficits      He can be weaned out of the thoracolumbosacral orthosis  He will start PT  Continue with topical medications that we discussed  If symptoms worsened any, would obtain an MRI of his thoracic spine      I will see him back in 5 weeks to reassess  Imaging Studies (I personally reviewed images in PACS and report if it was available):  Thoracic spine x-rays that are most recent to this encounter were reviewed  These images show minimal compression deformities of the T10 and T11 vertebral bodies which are likely acute due to his physical examination  There are flowing anterior osteophytes      Lumbar spine x-rays show grade 1 anterolisthesis of L4 on L5  There is mild lower lumbar facet hypertrophy  The disc spaces are preserved  No follow-ups on file  Patient is in agreement with the above plan  HPI:  Zabrina Mcghee is a 69 y o  male  who presents in follow up  Here for   Chief Complaint   Patient presents with   • Spine - Follow-up, Fracture       Since last visit: See above      Following history reviewed and updated:  Past Medical History:   Diagnosis Date   • Aortic aneurysm [facility])    • Cardiac disease    • Colon polyp    • Coronary artery disease    • Hypercholesteremia    • Hypertension    • Kidney stone    • Myocardial infarction ([facility] [address] )     5/04/2003: 2/8/2013     Past Surgical History:   Procedure Laterality Date   • CARDIAC SURGERY      patient reports having 4 stents placed   • COLONOSCOPY  07/2015    Due 7/2020   • COLONOSCOPY     • HERNIA REPAIR     • LITHOTRIPSY      x4   • OTHER SURGICAL HISTORY      stents   • TONSILLECTOMY     • UPPER GASTROINTESTINAL ENDOSCOPY       Social History   Social History     Substance and Sexual Activity   Alcohol Use Not Currently     Social History     Substance and Sexual Activity   Drug Use Never     Social History     Tobacco Use   Smoking Status Former   • Years: 0 00   • Types: Cigarettes   Smokeless Tobacco Never   Tobacco Comments    smoked in high school     Family History   Problem Relation Age of Onset   • Heart disease Father    • Diabetes Paternal Uncle      No Known Allergies     Constitutional:  /80   Pulse 72   Ht 5' 9" (1 753 m)   Wt 83 kg (183 lb)   BMI 27 02 kg/m²    General: NAD  Eyes: Clear sclerae  ENT: No inflammation, lesion, or mass of lips  No tracheal deviation  Musculoskeletal: As mentioned below  Integumentary: No visible rashes or skin lesions  Pulmonary/Chest: Effort normal  No respiratory distress  Neuro: CN's grossly intact, CACERES  Psych: Normal affect and judgement  Vascular: WWP  Back Exam     Tenderness   The patient is experiencing tenderness in the sacroiliac  Range of Motion   Extension: abnormal   Flexion: normal     Muscle Strength   The patient has normal back strength  Other   Sensation: normal  Gait: Forward flexed posture  Erythema: no back redness  Scars: absent    Comments:  No scissoring or myelopathic signs               Procedures

## 2023-05-11 ENCOUNTER — OFFICE VISIT (OUTPATIENT)
Dept: PODIATRY | Facility: CLINIC | Age: 78
End: 2023-05-11

## 2023-05-11 VITALS
DIASTOLIC BLOOD PRESSURE: 88 MMHG | BODY MASS INDEX: 26.75 KG/M2 | OXYGEN SATURATION: 96 % | SYSTOLIC BLOOD PRESSURE: 161 MMHG | HEIGHT: 69 IN | HEART RATE: 54 BPM | WEIGHT: 180.6 LBS

## 2023-05-11 DIAGNOSIS — L84 CORNS: ICD-10-CM

## 2023-05-11 DIAGNOSIS — B35.1 ONYCHOMYCOSIS: Primary | ICD-10-CM

## 2023-05-11 DIAGNOSIS — I73.9 PVD (PERIPHERAL VASCULAR DISEASE) (HCC): ICD-10-CM

## 2023-05-11 RX ORDER — COVID-19 MOLECULAR TEST ASSAY
KIT MISCELLANEOUS
COMMUNITY
Start: 2023-04-18

## 2023-05-11 NOTE — PROGRESS NOTES
Assessment/Plan:     The patient's clinical examination today is significant for thickened and dystrophic pedal nail plates with discoloration and subungual debris consistent with onychomycosis x10  There are no open lesions  There are callus lesions noted to the medial aspect of the hallux bilaterally, left worse than the right  The interdigital spaces are clear without maceration  There is some mild dry scaling skin to his feet bilaterally with decreased turgor  There is an absence of hair growth  Pedal pulses are palpable but diminished bilaterally  The pedal nail plates are sharply debrided with a sterile nail clipper V76 without complication  The nails were then reduced in thickness and girth utilizing a rotary bur  The callus lesions were debrided with a sterile #15 blade without complication  There are no other acute pedal issues  Recommend follow-up in 4 months or as needed  Diagnoses and all orders for this visit:    Onychomycosis    Corns    PVD (peripheral vascular disease) ([facility] )    Other orders  -     BinaxNOW COVID-19 Ag Home Test KIT; TEST AS DIRECTED TODAY          Subjective:     Patient ID: Rubén Lemon is a 69 y o  male  The patient presents today for follow-up of painful elongated pedal nail plates  The patient states he has been doing well since his last visit, but notes that the nails are starting get too long once again  He has no other acute pedal issues today        PAST MEDICAL HISTORY:  Past Medical History:   Diagnosis Date   • Aortic aneurysm [facility])    • Cardiac disease    • Colon polyp    • Coronary artery disease    • Hypercholesteremia    • Hypertension    • Kidney stone    • Myocardial infarction ([facility] [address] )     5/04/2003: 2/8/2013   • Osteoporosis 04/01/2023    pt reports       PAST SURGICAL HISTORY:  Past Surgical History:   Procedure Laterality Date   • CARDIAC SURGERY      patient reports having 4 stents placed   • COLONOSCOPY  07/2015    Due 7/2020   • COLONOSCOPY     • HERNIA REPAIR     • LITHOTRIPSY      x4   • OTHER SURGICAL HISTORY      stents   • TONSILLECTOMY     • UPPER GASTROINTESTINAL ENDOSCOPY          ALLERGIES:  Patient has no known allergies      MEDICATIONS:  Current Outpatient Medications   Medication Sig Dispense Refill   • acetaminophen-codeine (TYLENOL #3) 300-30 mg per tablet TAKE 1 TO 2 TABLETS BY MOUTH EVERY 4 HOURS AS NEEDED FOR MODERATE PAIN     • alendronate (FOSAMAX) 70 mg tablet Take 70 mg by mouth Once a week     • allopurinol (ZYLOPRIM) 300 mg tablet Take 300 mg by mouth daily     • Aspirin Low Dose 81 MG EC tablet Take 1 tablet (81 mg total) by mouth daily 90 tablet 3   • atorvastatin (LIPITOR) 40 mg tablet Take 40 mg by mouth daily     • BinaxNOW COVID-19 Ag Home Test KIT TEST AS DIRECTED TODAY     • cholecalciferol (VITAMIN D3) 1,000 units tablet Take 1,000 Units by mouth daily     • Diclofenac Sodium (VOLTAREN) 1 % Apply 2 g topically 3 (three) times a day as needed (Pain) 100 g 1   • famotidine (PEPCID) 20 mg tablet TAKE 1 TABLET BY MOUTH  TWICE DAILY 180 tablet 3   • lidocaine (Lidoderm) 5 % Apply 1 patch topically over 12 hours daily Remove & Discard patch within 12 hours or as directed by MD 30 patch 1   • metoprolol succinate (TOPROL-XL) 25 mg 24 hr tablet Take 0 5 tablets (12 5 mg total) by mouth daily 60 tablet 2   • nitroglycerin (NITROSTAT) 0 4 mg SL tablet Place 1 tablet under the tongue every 5 (five) minutes as needed     • pantoprazole (PROTONIX) 40 mg tablet Take 1 tablet (40 mg total) by mouth daily 30 tablet 0   • ramipril (ALTACE) 5 mg capsule Take 1 capsule (5 mg total) by mouth 2 (two) times a day 60 capsule 3   • Diclofenac Sodium (VOLTAREN) 1 % Apply 1 application topically 4 (four) times a day (Patient not taking: Reported on 10/14/2022)     • Diclofenac Sodium (VOLTAREN) 1 % Apply 2 g topically 3 (three) times a day as needed (Pain) (Patient not taking: Reported on 5/11/2023) 150 g 3   • naproxen (Naprosyn) 500 mg tablet Take 1 tablet (500 mg total) by mouth 2 (two) times a day with meals for 7 days 14 tablet 0   • senna-docusate sodium (SENOKOT S) 8 6-50 mg per tablet Take 1 tablet by mouth 2 (two) times a day       No current facility-administered medications for this visit  SOCIAL HISTORY:  Social History     Socioeconomic History   • Marital status: /Civil Union     Spouse name: None   • Number of children: None   • Years of education: None   • Highest education level: None   Occupational History   • None   Tobacco Use   • Smoking status: Former     Years: 0 00     Types: Cigarettes   • Smokeless tobacco: Never   • Tobacco comments:     smoked in high school   Vaping Use   • Vaping Use: Never used   Substance and Sexual Activity   • Alcohol use: Not Currently   • Drug use: Never   • Sexual activity: Not Currently     Partners: Female   Other Topics Concern   • None   Social History Narrative   • None     Social Determinants of Health     Financial Resource Strain: Not on file   Food Insecurity: Not on file   Transportation Needs: Not on file   Physical Activity: Not on file   Stress: Not on file   Social Connections: Not on file   Intimate Partner Violence: Not on file   Housing Stability: Not on file        Review of Systems   Constitutional: Negative  HENT: Negative  Eyes: Negative  Respiratory: Negative  Cardiovascular: Negative  Endocrine: Negative  Musculoskeletal: Negative  Skin: Negative  Neurological: Negative  Hematological: Negative  Psychiatric/Behavioral: Negative  Objective:     Physical Exam  Vitals reviewed  Constitutional:       Appearance: Normal appearance  HENT:      Head: Normocephalic and atraumatic  Nose: Nose normal    Eyes:      Conjunctiva/sclera: Conjunctivae normal       Pupils: Pupils are equal, round, and reactive to light     Cardiovascular:      Pulses:           Dorsalis pedis pulses are 1+ on the right side and 1+ on the left side         Posterior tibial pulses are 1+ on the right side and 1+ on the left side  Pulmonary:      Effort: Pulmonary effort is normal    Feet:      Right foot:      Skin integrity: Callus present  Toenail Condition: Right toenails are abnormally thick and long  Fungal disease present  Left foot:      Skin integrity: Callus present  Toenail Condition: Left toenails are abnormally thick and long  Fungal disease present  Comments: The patient's clinical examination today is significant for thickened and dystrophic pedal nail plates with discoloration and subungual debris consistent with onychomycosis x10  There are no open lesions  There are callus lesions noted to the medial aspect of the hallux bilaterally, left worse than the right  The interdigital spaces are clear without maceration  There is some mild dry scaling skin to his feet bilaterally with decreased turgor  There is an absence of hair growth  Pedal pulses are palpable but diminished bilaterally  Skin:     General: Skin is warm  Capillary Refill: Capillary refill takes less than 2 seconds  Neurological:      General: No focal deficit present  Mental Status: He is alert and oriented to person, place, and time  Psychiatric:         Mood and Affect: Mood normal          Behavior: Behavior normal          Thought Content:  Thought content normal

## 2023-06-08 ENCOUNTER — TELEPHONE (OUTPATIENT)
Dept: UROLOGY | Facility: AMBULATORY SURGERY CENTER | Age: 78
End: 2023-06-08

## 2023-06-08 NOTE — TELEPHONE ENCOUNTER
New Patient    What is the reason for the patient’s appointment?: Prostate Cancer- Elevated PSA    Patient states he has Prostate cancer and is not currently on treatment right now his current doctor just watches his PSA     Patient requesting to be seen in October but not sure if he should be seen sooner       What office location does the patient prefer?: [facility]     Does patient have Imaging/Lab Results:    PSA's are in chart     Have patient records been requested?:  If No, are the records showing in Epic:   Records in [facility]     Records will be sent over by current Urology office- Fax was given to patient     INSURANCE:  Do we accept the patient's insurance or is the patient Self-Pay?:     Insurance Provider: Christiana BOJORQUEZ [address] [facility]  Plan Type/Number: [id number]  Member ID#: [id number]      HISTORY:   Has the patient had any previous Urologist(s)?: Carmita Gillespie     Was the patient seen in the ED?: no     Has the patient had any outside testing done?: records will be faxed in     Does the patient have a personal history of cancer?: prostate 2016 had a positive biopsy       CB: [phone]

## 2023-06-08 NOTE — TELEPHONE ENCOUNTER
Symptoms as verbalized by patient: pt has elevated PSA  Pt reports changing over to office from a previous urologist  Pt reports had an appt with him yesterday  Reports PSA was 5 02, takes 300mg  Pt had a biopsy back Jan 2016 in office, reports current urologist just does labs every 4 months  No current issues  Was advised follow up in October and looking to come to us  Pt does state does not want any treatment for cancer at this time but wants to be seen  Office seen:   known to     Last office visit : NEW PT       History of: prostate CA    Next fu appt: 10/30/23  Recent Labs/Imaging /Testing: PSA on file   Pain: denies  Nausea/Vomiting: denies  Fever/Chills: denies  Frequency/Urgencey: denies  Stream: pt reports good     Appt scheduled per pt request in October   Pt will try to get records from current urologist

## 2023-06-18 DIAGNOSIS — I10 ESSENTIAL HYPERTENSION: Chronic | ICD-10-CM

## 2023-06-19 RX ORDER — RAMIPRIL 5 MG/1
CAPSULE ORAL
Qty: 180 CAPSULE | Refills: 3 | Status: SHIPPED | OUTPATIENT
Start: 2023-06-19

## 2023-07-11 ENCOUNTER — OFFICE VISIT (OUTPATIENT)
Dept: CARDIOLOGY CLINIC | Facility: CLINIC | Age: 78
End: 2023-07-11
Payer: COMMERCIAL

## 2023-07-11 VITALS
WEIGHT: 184 LBS | HEART RATE: 50 BPM | OXYGEN SATURATION: 96 % | HEIGHT: 69 IN | BODY MASS INDEX: 27.25 KG/M2 | SYSTOLIC BLOOD PRESSURE: 140 MMHG | DIASTOLIC BLOOD PRESSURE: 76 MMHG

## 2023-07-11 DIAGNOSIS — I25.10 CAD S/P PERCUTANEOUS CORONARY ANGIOPLASTY: ICD-10-CM

## 2023-07-11 DIAGNOSIS — I25.10 CORONARY ARTERY DISEASE INVOLVING NATIVE CORONARY ARTERY OF NATIVE HEART WITHOUT ANGINA PECTORIS: Primary | ICD-10-CM

## 2023-07-11 DIAGNOSIS — R00.1 BRADYCARDIA: ICD-10-CM

## 2023-07-11 DIAGNOSIS — I10 ESSENTIAL HYPERTENSION: Chronic | ICD-10-CM

## 2023-07-11 DIAGNOSIS — Z98.61 CAD S/P PERCUTANEOUS CORONARY ANGIOPLASTY: ICD-10-CM

## 2023-07-11 PROBLEM — I25.118 CORONARY ARTERY DISEASE OF NATIVE ARTERY OF NATIVE HEART WITH STABLE ANGINA PECTORIS (HCC): Status: ACTIVE | Noted: 2023-07-11

## 2023-07-11 PROCEDURE — 93000 ELECTROCARDIOGRAM COMPLETE: CPT | Performed by: INTERNAL MEDICINE

## 2023-07-11 PROCEDURE — 99213 OFFICE O/P EST LOW 20 MIN: CPT | Performed by: INTERNAL MEDICINE

## 2023-07-11 NOTE — PROGRESS NOTES
[facility]    CHIEF COMPLAINT:   Chief Complaint   Patient presents with   • Follow-up     HPI:  Johanna Yang is a 69 y.o. male with a past medical history significant for polio, hypertension, dyslipidemia, coronary artery disease, history of MI and prior coronary stenting, bradycardia. Briefly, he was admitted from 12/13/2022 - 10/14/2022 with complaints of epigastric and substernal discomfort. He had associated nausea and diaphoresis. His ECG was negative for acute ischemic changes. High-sensitivity troponin negative x3. CTA chest was performed and there was no evidence of dissection or aneurysm. Notably, he had a similar presentation in June 2022 and I saw him in the hospital at that time. Symptoms were felt to be GI related. He followed up with his cardiologist, Dr. Suleman Ribeiro. He underwent pharmacologic NM stress imaging on 7/15/2022 which revealed a medium sized, moderate intensity, fixed defect of the apical anterior. Gated SPECT revealed apical anterior hypokinesis with calculated LVEF 45%. No ischemia reported.     Interval history:  No lightheadedness, acute visual changes, chest pain, palpitations, shortness of breath, orthopnea, PND. +lower back pain     The following portions of the patient's history were reviewed and updated as appropriate: allergies, current medications, past family history, past medical history, past social history, past surgical history, and problem list.    SINCE LAST OV I REVIEWED WITH THE PATIENT THE INTERIM LABS, TEST RESULTS, CONSULTANT(S) NOTES AND PERFORMED AN INTERIM REVIEW OF HISTORY    Past Medical History:   Diagnosis Date   • Aortic aneurysm ([address])    • Cardiac disease    • Colon polyp    • Coronary artery disease    • Hypercholesteremia    • Hypertension    • Kidney stone    • Myocardial infarction ([address])     5/04/2003: 2/8/2013   • Osteoporosis 04/01/2023    pt reports       Past Surgical History:   Procedure Laterality Date   • CARDIAC SURGERY      patient reports having 4 stents placed   • COLONOSCOPY  07/2015    Due 7/2020   • COLONOSCOPY     • HERNIA REPAIR     • LITHOTRIPSY      x4   • OTHER SURGICAL HISTORY      stents   • TONSILLECTOMY     • UPPER GASTROINTESTINAL ENDOSCOPY         Social History     Socioeconomic History   • Marital status: /Civil Union     Spouse name: Not on file   • Number of children: Not on file   • Years of education: Not on file   • Highest education level: Not on file   Occupational History   • Not on file   Tobacco Use   • Smoking status: Former     Years: 0.00     Types: Cigarettes   • Smokeless tobacco: Never   • Tobacco comments:     smoked in high school   Vaping Use   • Vaping Use: Never used   Substance and Sexual Activity   • Alcohol use: Not Currently   • Drug use: Never   • Sexual activity: Not Currently     Partners: Female   Other Topics Concern   • Not on file   Social History Narrative   • Not on file     Social Determinants of Health     Financial Resource Strain: Not on file   Food Insecurity: Not on file   Transportation Needs: Not on file   Physical Activity: Not on file   Stress: Not on file   Social Connections: Not on file   Intimate Partner Violence: Not on file   Housing Stability: Not on file       Family History   Problem Relation Age of Onset   • Heart disease Father    • Diabetes Paternal Uncle        No Known Allergies    Current Outpatient Medications   Medication Sig Dispense Refill   • alendronate (FOSAMAX) 70 mg tablet Take 70 mg by mouth Once a week     • allopurinol (ZYLOPRIM) 300 mg tablet Take 300 mg by mouth daily     • Aspirin Low Dose 81 MG EC tablet Take 1 tablet (81 mg total) by mouth daily 90 tablet 3   • atorvastatin (LIPITOR) 40 mg tablet Take 40 mg by mouth daily     • cholecalciferol (VITAMIN D3) 1,000 units tablet Take 1,000 Units by mouth daily     • Diclofenac Sodium (VOLTAREN) 1 % Apply 2 g topically 3 (three) times a day as needed (Pain) 100 g 1   • famotidine (PEPCID) 20 mg tablet TAKE 1 TABLET BY MOUTH  TWICE DAILY 180 tablet 3   • lidocaine (Lidoderm) 5 % Apply 1 patch topically over 12 hours daily Remove & Discard patch within 12 hours or as directed by MD 30 patch 1   • metoprolol succinate (TOPROL-XL) 25 mg 24 hr tablet Take 0.5 tablets (12.5 mg total) by mouth daily 60 tablet 2   • nitroglycerin (NITROSTAT) 0.4 mg SL tablet Place 1 tablet under the tongue every 5 (five) minutes as needed     • pantoprazole (PROTONIX) 40 mg tablet Take 1 tablet (40 mg total) by mouth daily 30 tablet 0   • ramipril (ALTACE) 5 mg capsule TAKE 1 CAPSULE BY MOUTH TWICE  DAILY 180 capsule 3   • acetaminophen-codeine (TYLENOL #3) 300-30 mg per tablet TAKE 1 TO 2 TABLETS BY MOUTH EVERY 4 HOURS AS NEEDED FOR MODERATE PAIN (Patient not taking: Reported on 7/11/2023)     • BinaxNOW COVID-19 Ag Home Test KIT TEST AS DIRECTED TODAY (Patient not taking: Reported on 7/11/2023)     • Diclofenac Sodium (VOLTAREN) 1 % Apply 1 application topically 4 (four) times a day (Patient not taking: Reported on 10/14/2022)     • Diclofenac Sodium (VOLTAREN) 1 % Apply 2 g topically 3 (three) times a day as needed (Pain) (Patient not taking: Reported on 5/11/2023) 150 g 3   • naproxen (Naprosyn) 500 mg tablet Take 1 tablet (500 mg total) by mouth 2 (two) times a day with meals for 7 days 14 tablet 0   • senna-docusate sodium (SENOKOT S) 8.6-50 mg per tablet Take 1 tablet by mouth 2 (two) times a day       No current facility-administered medications for this visit. /76   Pulse (!) 50   Ht 5' 9" (1.753 m)   Wt 83.5 kg (184 lb)   SpO2 96%   BMI 27.17 kg/m²     Review of Systems   All other systems reviewed and are negative. Physical Exam  Vitals reviewed. Constitutional:       General: He is not in acute distress. Appearance: Normal appearance. He is well-developed. He is not toxic-appearing. HENT:      Head: Normocephalic and atraumatic.    Eyes:      General: No scleral icterus. Conjunctiva/sclera: Conjunctivae normal.   Cardiovascular:      Rate and Rhythm: Normal rate and regular rhythm. Pulses: Normal pulses. Heart sounds: Normal heart sounds. No murmur heard. No gallop. Pulmonary:      Effort: Pulmonary effort is normal. No respiratory distress. Breath sounds: Normal breath sounds. No wheezing or rales. Abdominal:      General: Abdomen is flat. Bowel sounds are normal. There is no distension. Palpations: Abdomen is soft. Tenderness: There is no abdominal tenderness. Musculoskeletal:         General: No swelling. Right lower leg: No edema. Left lower leg: No edema. Skin:     General: Skin is warm and dry. Capillary Refill: Capillary refill takes less than 2 seconds. Coloration: Skin is not jaundiced. Neurological:      Mental Status: He is alert. Psychiatric:         Mood and Affect: Mood normal.          Lab Results   Component Value Date    K 4.5 10/14/2022     10/14/2022    CO2 29 10/14/2022    BUN 12 10/14/2022    CREATININE 0.80 10/14/2022    CALCIUM 8.9 10/14/2022    ALT 24 10/13/2022    AST 39 10/13/2022    INR 1.09 06/26/2020       Lab Results   Component Value Date    HDL 41 06/14/2022    LDLCALC 63 06/14/2022    TRIG 83 06/14/2022       Lab Results   Component Value Date    WBC 8.07 10/14/2022    HGB 12.4 10/14/2022    HCT 38.7 10/14/2022     10/14/2022       Lab Results   Component Value Date     12/04/2019    HGBA1C 5.5 12/04/2019     Cardiac studies:   ECG - 7/11/23: SB, RBBB, LAFB, septal infarct, cannot rule out lateral infarct   ECG - 10/13/22: SB, RBBB, LAFB, septal infarct, lateral infarct    Pharmacologic NM stress -7/15/2022: revealed a medium sized, moderate intensity, fixed defect of the apical anterior. Gated SPECT revealed apical anterior hypokinesis with calculated LVEF 45%. No ischemia reported.     TTE - 6/14/22:  •  Left Ventricle: Left ventricular cavity size is normal. Wall thickness is normal. The left ventricular ejection fraction is 55%. Systolic function is normal. Diastolic function is mildly abnormal, consistent with grade I (abnormal) relaxation. •  The following segments are hypokinetic: apical anterior, apical septal and apex. •  All other segments are normal.  •  Left Atrium: The atrium is mildly dilated. •  Mitral Valve: There is mild regurgitation. •  Frequent PVC's are noted. •  Wall motion abnormality matches area of infarct noted on prior stress nuclear study from 10/29/20      ASSESSMENT AND PLAN:  Lucia Weeks was seen today for follow-up. Diagnoses and all orders for this visit:    #. Coronary artery disease involving native coronary artery of native heart without angina pectoris  #. CAD S/P percutaneous coronary angioplasty  -     POCT ECG  #. Essential hypertension  -     POCT ECG  #. Bradycardia  -     POCT ECG    Mr. Florence Tanner has a history of coronary artery disease and prior PCI/stents. Pharmacologic NM stress testing in July 2022 revealed a medium sized, moderate intensity, fixed defect of the apical anterior. Gated SPECT revealed apical anterior hypokinesis with a calculated LVEF 45%. No ischemia was reported. He presents today for follow-up and has no anginal complaints. Lipid panel from 5/25/23: , TGs 66, HDL 53, LDL 68. LDAL at goal <70. Continue atorvastatin 40 mg daily. Continue aspirin 81 mg daily. Has SL NTG but does not use. He has asymptomatic sinus bradycardia and is on a low-dose of metoprolol succinate for which we can continue at this time. Given that he is 10 years post his last myocardial infarction, this can be discontinued if not tolerated. If antianginal therapy is needed we can start a long-acting nitrate or calcium channel blocker. Blood pressure is mildly elevated today on ramipril 5 g daily    Follow up in 6 months or earlier PRN.     Arsen Quiles MD

## 2023-07-11 NOTE — PATIENT INSTRUCTIONS
You were seen today in the Cardiology office for follow up. No medication changes were made today. Please continue all your cardiac medications as prescribed. Thank you for choosing [facility].

## 2023-08-10 ENCOUNTER — TRANSCRIBE ORDERS (OUTPATIENT)
Dept: PAIN MEDICINE | Facility: CLINIC | Age: 78
End: 2023-08-10

## 2023-08-10 ENCOUNTER — HOSPITAL ENCOUNTER (OUTPATIENT)
Dept: RADIOLOGY | Facility: HOSPITAL | Age: 78
Discharge: HOME | End: 2023-08-10
Payer: COMMERCIAL

## 2023-08-10 ENCOUNTER — CONSULT (OUTPATIENT)
Dept: PAIN MEDICINE | Facility: CLINIC | Age: 78
End: 2023-08-10
Payer: COMMERCIAL

## 2023-08-10 VITALS
BODY MASS INDEX: 27.17 KG/M2 | WEIGHT: 184 LBS | DIASTOLIC BLOOD PRESSURE: 78 MMHG | HEART RATE: 60 BPM | SYSTOLIC BLOOD PRESSURE: 152 MMHG

## 2023-08-10 DIAGNOSIS — S22.000A COMPRESSION FRACTURE OF BODY OF THORACIC VERTEBRA (HCC): ICD-10-CM

## 2023-08-10 DIAGNOSIS — M47.812 CERVICAL SPONDYLOSIS: ICD-10-CM

## 2023-08-10 DIAGNOSIS — M54.2 NECK PAIN: ICD-10-CM

## 2023-08-10 DIAGNOSIS — M47.812 CERVICAL FACET JOINT SYNDROME: ICD-10-CM

## 2023-08-10 DIAGNOSIS — S22.000A COMPRESSION FRACTURE OF BODY OF THORACIC VERTEBRA (HCC): Primary | ICD-10-CM

## 2023-08-10 PROCEDURE — 72050 X-RAY EXAM NECK SPINE 4/5VWS: CPT

## 2023-08-10 PROCEDURE — 99204 OFFICE O/P NEW MOD 45 MIN: CPT | Performed by: PHYSICAL MEDICINE & REHABILITATION

## 2023-08-10 NOTE — PROGRESS NOTES
Assessment  1. Compression fracture of body of thoracic vertebra (HCC)    2. Neck pain    3. Cervical facet joint syndrome    4. Cervical spondylosis        Plan  Mr. Zulema Zhu is a pleasant 78-year-old male significant past medical history of coronary artery disease, compression fracture T10, T11 and peripheral vascular disease presents for initial evaluation regarding several months duration of worsening neck pain bilaterally. During today's evaluation he is demonstrating pain that is likely multifactorial in nature with the majority of his pain appears to be generating from the cervical facets. At this time we will  1. Place the patient in a formal physical therapy program x 4 weeks or longer if needed  2. We will order updated cervical x-ray to evaluate for any progressive and worsening bony pathology contributing to symptoms  3. We will schedule the patient for left-sided C4, C5, C6 medial branch nerve blocks with intention of moving forward towards radiofrequency ablation if there is an appropriate diagnostic response. The initial blocks will be performed with 0.25% bupivacaine and if an appropriate response is obtained upon review of the patient's pain diary, a confirmatory block will be scheduled with 0.75% bupivacaine. In the office today, we reviewed the nature of facet joint pathology in depth using a spine model. We discussed the approach we would use for the injections and provided literature for home review. The patient understands the risks associated with the procedure including bleeding, infection, tissue injury, and allergic reaction and provided verbal informed consent in the office today. My impressions and treatment recommendations were discussed in detail with the patient who verbalized understanding and had no further questions. Discharge instructions were provided. I personally saw and examined the patient and I agree with the above discussed plan of care.     Orders Placed This Encounter   Procedures   • X-ray cervical spine complete 4 or 5 vw     Standing Status:   Future     Standing Expiration Date:   8/10/2027     Scheduling Instructions:      Bring along any outside films relating to this procedure. • FL spine and pain procedure     Standing Status:   Future     Standing Expiration Date:   8/10/2027     Order Specific Question:   Reason for Exam:     Answer:   Left sided C4, C5, C6 MBB #1     Order Specific Question:   Anticoagulant hold needed? Answer:   No   • Ambulatory referral to Physical Therapy     Standing Status:   Future     Standing Expiration Date:   8/10/2024     Referral Priority:   Routine     Referral Type:   Physical Therapy     Referral Reason:   Specialty Services Required     Requested Specialty:   Physical Therapy     Number of Visits Requested:   1     Expiration Date:   8/10/2024     No orders of the defined types were placed in this encounter. History of Present Illness    Alisa Carmen is a 69 y.o. male presents to [address] [place] [address] [facility] for initial evaluation regarding 8 to 9 months of head and neck pain. Denies any significant inciting event or recent trauma. Today reports moderate to severe pain rated 8 out of 10 and interfering with daily activities including walking. Reports pain is nearly constant 60 to 95% of the time that is worse in the afternoon. Describes symptoms as shooting, throbbing pain. Denies any significant weakness or falls. Requires a cane for ambulation. Symptoms are worse with walking. Reports no significant relief with home exercises and rest.  Denies smoking or marijuana or alcohol use. Not currently take anything for pain. Presents today for initial evaluation. I have personally reviewed and/or updated the patient's past medical history, past surgical history, family history, social history, current medications, allergies, and vital signs today.      Review of Systems   Constitutional: Negative for fever and unexpected weight change. HENT: Negative for trouble swallowing. Eyes: Negative for visual disturbance. Respiratory: Negative for shortness of breath and wheezing. Cardiovascular: Negative for chest pain and palpitations. Gastrointestinal: Negative for constipation, diarrhea, nausea and vomiting. Endocrine: Negative for cold intolerance, heat intolerance and polydipsia. Genitourinary: Negative for difficulty urinating and frequency. Musculoskeletal: Negative for arthralgias, gait problem, joint swelling and myalgias. Skin: Negative for rash. Neurological: Negative for dizziness, seizures, syncope, weakness and headaches. Hematological: Does not bruise/bleed easily. Psychiatric/Behavioral: Negative for dysphoric mood. All other systems reviewed and are negative.       Patient Active Problem List   Diagnosis   • Chest pain   • Essential hypertension   • Bradycardia   • Dyslipidemia   • Prostate cancer ([address])   • CAD S/P percutaneous coronary angioplasty   • History of PTCA with 4 stents   • Gastroesophageal reflux disease   • Arthritis   • PVD (peripheral vascular disease) ([facility])   • Hyperuricemia   • Vitamin D deficiency   • Class 1 obesity due to excess calories with serious comorbidity and body mass index (BMI) of 30.0 to 30.9 in adult   • Shortness of breath   • Chronic bilateral low back pain without sciatica   • Compression fracture of body of thoracic vertebra ([facility])   • Coronary artery disease of native artery of native heart with stable angina pectoris [facility])       Past Medical History:   Diagnosis Date   • Aortic aneurysm ([facility])    • Cardiac disease    • Colon polyp    • Coronary artery disease    • Hypercholesteremia    • Hypertension    • Kidney stone    • Myocardial infarction ([address])     5/04/2003: 2/8/2013   • Osteoporosis 04/01/2023    pt reports       Past Surgical History:   Procedure Laterality Date   • CARDIAC SURGERY      patient reports having 4 stents placed   • COLONOSCOPY  07/2015    Due 7/2020   • COLONOSCOPY     • HERNIA REPAIR     • LITHOTRIPSY      x4   • OTHER SURGICAL HISTORY      stents   • TONSILLECTOMY     • UPPER GASTROINTESTINAL ENDOSCOPY         Family History   Problem Relation Age of Onset   • Heart disease Father    • Diabetes Paternal Uncle        Social History     Occupational History   • Not on file   Tobacco Use   • Smoking status: Former     Years: 0.00     Types: Cigarettes   • Smokeless tobacco: Never   • Tobacco comments:     smoked in high school   Vaping Use   • Vaping Use: Never used   Substance and Sexual Activity   • Alcohol use: Not Currently   • Drug use: Never   • Sexual activity: Not Currently     Partners: Female       Current Outpatient Medications on File Prior to Visit   Medication Sig   • acetaminophen-codeine (TYLENOL #3) 300-30 mg per tablet    • alendronate (FOSAMAX) 70 mg tablet Take 70 mg by mouth Once a week   • allopurinol (ZYLOPRIM) 300 mg tablet Take 300 mg by mouth daily   • Aspirin Low Dose 81 MG EC tablet Take 1 tablet (81 mg total) by mouth daily   • atorvastatin (LIPITOR) 40 mg tablet Take 40 mg by mouth daily   • BinaxNOW COVID-19 Ag Home Test KIT    • cholecalciferol (VITAMIN D3) 1,000 units tablet Take 1,000 Units by mouth daily   • Diclofenac Sodium (VOLTAREN) 1 % Apply 1 application topically 4 (four) times a day (Patient not taking: Reported on 10/14/2022)   • Diclofenac Sodium (VOLTAREN) 1 % Apply 2 g topically 3 (three) times a day as needed (Pain)   • Diclofenac Sodium (VOLTAREN) 1 % Apply 2 g topically 3 (three) times a day as needed (Pain)   • famotidine (PEPCID) 20 mg tablet TAKE 1 TABLET BY MOUTH  TWICE DAILY   • lidocaine (Lidoderm) 5 % Apply 1 patch topically over 12 hours daily Remove & Discard patch within 12 hours or as directed by MD   • metoprolol succinate (TOPROL-XL) 25 mg 24 hr tablet Take 0.5 tablets (12.5 mg total) by mouth daily   • naproxen (Naprosyn) 500 mg tablet Take 1 tablet (500 mg total) by mouth 2 (two) times a day with meals for 7 days   • nitroglycerin (NITROSTAT) 0.4 mg SL tablet Place 1 tablet under the tongue every 5 (five) minutes as needed   • pantoprazole (PROTONIX) 40 mg tablet Take 1 tablet (40 mg total) by mouth daily   • ramipril (ALTACE) 5 mg capsule TAKE 1 CAPSULE BY MOUTH TWICE  DAILY   • senna-docusate sodium (SENOKOT S) 8.6-50 mg per tablet Take 1 tablet by mouth 2 (two) times a day     No current facility-administered medications on file prior to visit. No Known Allergies    Physical Exam    /78   Pulse 60   Wt 83.5 kg (184 lb)   BMI 27.17 kg/m²     Constitutional: normal, well developed, well nourished, alert, in no distress and non-toxic and no overt pain behavior. Eyes: anicteric  HEENT: grossly intact  Neck: supple, symmetric, trachea midline and no masses   Pulmonary:even and unlabored  Cardiovascular:No edema or pitting edema present  Skin:Normal without rashes or lesions and well hydrated  Psychiatric:Mood and affect appropriate  Neurologic:Cranial Nerves II-XII grossly intact  Musculoskeletal:normal, tenderness to palpation left-sided cervical paraspinals, decreased active and passive range of motion with cervical flexion, extension, left sidebending and rotation limited by pain, MMT 5 out of 5 bilateral upper extremities, sensation grossly intact to light touch, negative Spurling bilaterally, positive cervical compression testing bilateral cervical facets    Imaging          THORACIC SPINE     INDICATION:   M54.50: Low back pain, unspecified  G89.29: Other chronic pain.     COMPARISON:  None     VIEWS:  XR SPINE THORACIC 2 VW        FINDINGS: Large hiatal hernia.     There is no fracture or pathologic bone lesion.     Thoracic vertebral alignment is within normal limits.     Mild multilevel degenerative changes.      There is no displacement of the paraspinal line.      The pedicles appear intact.     IMPRESSION:  Large hiatal hernia.   Mild multilevel degenerative changes  No acute osseous abnormality.       Cervical spinal stenosis [K74.61 (ICD-10-CM)]; DDD (degenerative disc   disease), cervical [M50.30 (ICD-10-CM)] . Technique:4 radiographic views of the cervical spine . Comparisons: None. Findings: Suboptimal evaluation of the dens on the AP view. Suboptimal   evaluation of C7 and T1 on the lateral view. There is suboptimal evaluation of   the lower neural foramina bilaterally. No acute fracture or dislocation visualized. Disc spaces appear grossly preserved. Bone mineralization appears unremarkable. No acute soft tissue abnormality visualized. Impressions:     Limited study. No acute abnormality visualized.

## 2023-08-16 ENCOUNTER — TELEPHONE (OUTPATIENT)
Age: 78
End: 2023-08-16

## 2023-08-16 NOTE — TELEPHONE ENCOUNTER
.Caller: pt    Doctor: Mone Hernadez    Reason for call: Pt called in today about his X-ray results. Pt is aware that results are still pending. Please keep an eye out for the results.     Call back#: [phone]

## 2023-08-17 NOTE — TELEPHONE ENCOUNTER
Caller: Lawanda Gracia spouse     Doctor: Dr Kayode Henderson     Reason for call: Pt's wife called in to check on the status of the XRAY .  Was advised they are still PENDING     Call back#: [phone] once reviewed

## 2023-08-21 ENCOUNTER — TELEPHONE (OUTPATIENT)
Age: 78
End: 2023-08-21

## 2023-08-21 NOTE — TELEPHONE ENCOUNTER
----- Message from Evette Bright DO sent at 8/21/2023 10:42 AM EDT -----  Regarding: FW: X-Rays Question   Contact: [phone]  Please let patient know of cervical x-ray results demonstrating multilevel facet hypertrophy, foraminal narrowing and degenerative disc disease  Would still proceed with medial branch block plan as previously scheduled  Thank you  ----- Message -----  From: Angel Luis Bhakta, RN  Sent: 8/21/2023   7:59 AM EDT  To: Evette Bright DO  Subject: FW: X-Rays Question                              Xray results are now final in Epic.  ----- Message -----  From: Rene Parker RN  Sent: 8/21/2023   7:44 AM EDT  To: [facility]  Subject: FW: X-Rays Question                                ----- Message -----  From: Terrence Spurling  Sent: 8/18/2023   5:25 PM EDT  To: [facility]  Subject: X-Rays Question                                  This message is being sent by Karina Fong on behalf of Terrence Spurling. I had x-rays on my neck at [facility]. Alexsukhwinder Bolds on August 10 as you requested that I get done. I called about them and was told they were pending. WHY? Is there a problem interpreting them. I am concerned …please let me know…my procedure is in September . .thank you…Kiko Mclain

## 2023-09-05 ENCOUNTER — OFFICE VISIT (OUTPATIENT)
Dept: PODIATRY | Facility: CLINIC | Age: 78
End: 2023-09-05
Payer: COMMERCIAL

## 2023-09-05 VITALS
WEIGHT: 185 LBS | HEIGHT: 69 IN | SYSTOLIC BLOOD PRESSURE: 149 MMHG | BODY MASS INDEX: 27.4 KG/M2 | OXYGEN SATURATION: 98 % | HEART RATE: 58 BPM | DIASTOLIC BLOOD PRESSURE: 81 MMHG

## 2023-09-05 DIAGNOSIS — I73.9 PVD (PERIPHERAL VASCULAR DISEASE) (HCC): ICD-10-CM

## 2023-09-05 DIAGNOSIS — L84 CORNS: ICD-10-CM

## 2023-09-05 DIAGNOSIS — B35.1 ONYCHOMYCOSIS: Primary | ICD-10-CM

## 2023-09-05 PROCEDURE — 99212 OFFICE O/P EST SF 10 MIN: CPT | Performed by: PODIATRIST

## 2023-09-05 RX ORDER — NIRMATRELVIR AND RITONAVIR 300-100 MG
KIT ORAL
COMMUNITY
Start: 2023-08-25

## 2023-09-05 NOTE — PROGRESS NOTES
Assessment/Plan:     The patient's clinical examination today is significant for thickened and dystrophic pedal nail plates with discoloration and subungual debris consistent with onychomycosis x10. There are no open lesions. There are callus lesions noted to the medial aspect of the hallux bilaterally, left worse than the right. The interdigital spaces are clear without maceration. There is some mild dry scaling skin to his feet bilaterally with decreased turgor. There is an absence of hair growth. Pedal pulses are palpable but diminished bilaterally.     The pedal nail plates are sharply debrided with a sterile nail clipper Y18 without complication. The nails were then reduced in thickness and girth utilizing a rotary bur. The callus lesions were debrided with a rotary bur without complication. There are no other acute pedal issues. Recommend follow-up in 4 months or as needed.        Diagnoses and all orders for this visit:    Onychomycosis    PVD (peripheral vascular disease) ([address])    [place]orns    Other orders  -     Paxlovid, 300/100, tablet therapy pack; TAKE 2 NIRMATRELVIR TABLETS AND 1 RITONAVIR TABLET TOGETHER BY MOUTH TWICE DAILY FOR 5 DAYS (Patient not taking: Reported on 9/5/2023)          Subjective:     Patient ID: Adam Thornton is a 69 y.o. male. The patient presents today for follow-up of painful elongated pedal nail plates. The patient states he has been doing well since his last visit, but notes that the nails are starting get too long once again.   He has no other acute pedal issues today.         PAST MEDICAL HISTORY:  Past Medical History:   Diagnosis Date   • Aortic aneurysm [facility])    • Cardiac disease    • Colon polyp    • Coronary artery disease    • Hypercholesteremia    • Hypertension    • Kidney stone    • Myocardial infarction ([address])     5/04/2003: 2/8/2013   • Osteoporosis 04/01/2023    pt reports       PAST SURGICAL HISTORY:  Past Surgical History:   Procedure Laterality Date • CARDIAC SURGERY      patient reports having 4 stents placed   • COLONOSCOPY  07/2015    Due 7/2020   • COLONOSCOPY     • HERNIA REPAIR     • LITHOTRIPSY      x4   • OTHER SURGICAL HISTORY      stents   • TONSILLECTOMY     • UPPER GASTROINTESTINAL ENDOSCOPY          ALLERGIES:  Patient has no known allergies.     MEDICATIONS:  Current Outpatient Medications   Medication Sig Dispense Refill   • acetaminophen-codeine (TYLENOL #3) 300-30 mg per tablet      • alendronate (FOSAMAX) 70 mg tablet Take 70 mg by mouth Once a week     • allopurinol (ZYLOPRIM) 300 mg tablet Take 300 mg by mouth daily     • Aspirin Low Dose 81 MG EC tablet Take 1 tablet (81 mg total) by mouth daily 90 tablet 3   • atorvastatin (LIPITOR) 40 mg tablet Take 40 mg by mouth daily     • BinaxNOW COVID-19 Ag Home Test KIT      • cholecalciferol (VITAMIN D3) 1,000 units tablet Take 1,000 Units by mouth daily     • Diclofenac Sodium (VOLTAREN) 1 % Apply 2 g topically 3 (three) times a day as needed (Pain) 150 g 3   • Diclofenac Sodium (VOLTAREN) 1 % Apply 2 g topically 3 (three) times a day as needed (Pain) 100 g 1   • famotidine (PEPCID) 20 mg tablet TAKE 1 TABLET BY MOUTH  TWICE DAILY 180 tablet 3   • lidocaine (Lidoderm) 5 % Apply 1 patch topically over 12 hours daily Remove & Discard patch within 12 hours or as directed by MD 30 patch 1   • metoprolol succinate (TOPROL-XL) 25 mg 24 hr tablet Take 0.5 tablets (12.5 mg total) by mouth daily 60 tablet 2   • nitroglycerin (NITROSTAT) 0.4 mg SL tablet Place 1 tablet under the tongue every 5 (five) minutes as needed     • pantoprazole (PROTONIX) 40 mg tablet Take 1 tablet (40 mg total) by mouth daily 30 tablet 0   • ramipril (ALTACE) 5 mg capsule TAKE 1 CAPSULE BY MOUTH TWICE  DAILY 180 capsule 3   • Diclofenac Sodium (VOLTAREN) 1 % Apply 1 application topically 4 (four) times a day (Patient not taking: Reported on 10/14/2022)     • naproxen (Naprosyn) 500 mg tablet Take 1 tablet (500 mg total) by mouth 2 (two) times a day with meals for 7 days 14 tablet 0   • Paxlovid, 300/100, tablet therapy pack TAKE 2 NIRMATRELVIR TABLETS AND 1 RITONAVIR TABLET TOGETHER BY MOUTH TWICE DAILY FOR 5 DAYS (Patient not taking: Reported on 9/5/2023)     • senna-docusate sodium (SENOKOT S) 8.6-50 mg per tablet Take 1 tablet by mouth 2 (two) times a day       No current facility-administered medications for this visit. SOCIAL HISTORY:  Social History     Socioeconomic History   • Marital status: /Civil Union     Spouse name: None   • Number of children: None   • Years of education: None   • Highest education level: None   Occupational History   • None   Tobacco Use   • Smoking status: Former     Years: 0.00     Types: Cigarettes   • Smokeless tobacco: Never   • Tobacco comments:     smoked in high school   Vaping Use   • Vaping Use: Never used   Substance and Sexual Activity   • Alcohol use: Not Currently   • Drug use: Never   • Sexual activity: Not Currently     Partners: Female   Other Topics Concern   • None   Social History Narrative   • None     Social Determinants of Health     Financial Resource Strain: Not on file   Food Insecurity: Not on file   Transportation Needs: Not on file   Physical Activity: Not on file   Stress: Not on file   Social Connections: Not on file   Intimate Partner Violence: Not on file   Housing Stability: Not on file         Review of Systems   Constitutional: Negative. HENT: Negative. Eyes: Negative. Respiratory: Negative. Cardiovascular: Negative. Endocrine: Negative. Musculoskeletal: Negative. Skin: Negative. Neurological: Negative. Hematological: Negative. Psychiatric/Behavioral: Negative. Objective:     Physical Exam  Vitals reviewed. Constitutional:       Appearance: Normal appearance. HENT:      Head: Normocephalic and atraumatic.       Nose: Nose normal.   Eyes:      Conjunctiva/sclera: Conjunctivae normal.      Pupils: Pupils are equal, round, and reactive to light. Cardiovascular:      Pulses:           Dorsalis pedis pulses are 1+ on the right side and 1+ on the left side. Posterior tibial pulses are 0 on the right side and 0 on the left side. Pulmonary:      Effort: Pulmonary effort is normal.   Feet:      Right foot:      Skin integrity: Dry skin present. Toenail Condition: Right toenails are abnormally thick and long. Fungal disease present. Left foot:      Skin integrity: Dry skin present. Toenail Condition: Left toenails are abnormally thick and long. Fungal disease present. Comments: The patient's clinical examination today is significant for thickened and dystrophic pedal nail plates with discoloration and subungual debris consistent with onychomycosis x10. There are no open lesions. There are callus lesions noted to the medial aspect of the hallux bilaterally, left worse than the right. The interdigital spaces are clear without maceration. There is some mild dry scaling skin to his feet bilaterally with decreased turgor. There is an absence of hair growth. Pedal pulses are palpable but diminished bilaterally. Skin:     General: Skin is warm. Capillary Refill: Capillary refill takes 2 to 3 seconds. Neurological:      General: No focal deficit present. Mental Status: He is alert and oriented to person, place, and time. Psychiatric:         Mood and Affect: Mood normal.         Behavior: Behavior normal.         Thought Content:  Thought content normal.

## 2023-10-12 ENCOUNTER — APPOINTMENT (OUTPATIENT)
Dept: LAB | Facility: HOSPITAL | Age: 78
End: 2023-10-12
Attending: SPECIALIST
Payer: COMMERCIAL

## 2023-10-12 DIAGNOSIS — C61 MALIGNANT NEOPLASM OF PROSTATE (HCC): ICD-10-CM

## 2023-10-12 LAB — PSA SERPL-MCNC: 4.77 NG/ML (ref 0–4)

## 2023-10-12 PROCEDURE — G0103 PSA SCREENING: HCPCS

## 2023-10-12 PROCEDURE — 36415 COLL VENOUS BLD VENIPUNCTURE: CPT

## 2023-12-05 ENCOUNTER — APPOINTMENT (OUTPATIENT)
Dept: RADIOLOGY | Facility: AMBULARY SURGERY CENTER | Age: 78
End: 2023-12-05
Payer: COMMERCIAL

## 2023-12-05 ENCOUNTER — OFFICE VISIT (OUTPATIENT)
Dept: OBGYN CLINIC | Facility: CLINIC | Age: 78
End: 2023-12-05
Payer: COMMERCIAL

## 2023-12-05 DIAGNOSIS — M25.512 CHRONIC LEFT SHOULDER PAIN: Primary | ICD-10-CM

## 2023-12-05 DIAGNOSIS — G89.29 CHRONIC LEFT SHOULDER PAIN: ICD-10-CM

## 2023-12-05 DIAGNOSIS — M25.512 CHRONIC LEFT SHOULDER PAIN: ICD-10-CM

## 2023-12-05 DIAGNOSIS — G89.29 CHRONIC LEFT SHOULDER PAIN: Primary | ICD-10-CM

## 2023-12-05 PROCEDURE — 99214 OFFICE O/P EST MOD 30 MIN: CPT | Performed by: PHYSICAL MEDICINE & REHABILITATION

## 2023-12-05 PROCEDURE — 73030 X-RAY EXAM OF SHOULDER: CPT

## 2023-12-05 PROCEDURE — 20610 DRAIN/INJ JOINT/BURSA W/O US: CPT | Performed by: PHYSICAL MEDICINE & REHABILITATION

## 2023-12-05 RX ORDER — TRIAMCINOLONE ACETONIDE 40 MG/ML
80 INJECTION, SUSPENSION INTRA-ARTICULAR; INTRAMUSCULAR
Status: COMPLETED | OUTPATIENT
Start: 2023-12-05 | End: 2023-12-05

## 2023-12-05 RX ORDER — ROPIVACAINE HYDROCHLORIDE 5 MG/ML
10 INJECTION, SOLUTION EPIDURAL; INFILTRATION; PERINEURAL
Status: COMPLETED | OUTPATIENT
Start: 2023-12-05 | End: 2023-12-05

## 2023-12-05 RX ADMIN — TRIAMCINOLONE ACETONIDE 80 MG: 40 INJECTION, SUSPENSION INTRA-ARTICULAR; INTRAMUSCULAR at 14:30

## 2023-12-05 RX ADMIN — ROPIVACAINE HYDROCHLORIDE 10 ML: 5 INJECTION, SOLUTION EPIDURAL; INFILTRATION; PERINEURAL at 14:30

## 2023-12-05 NOTE — PROGRESS NOTES
1. Chronic left shoulder pain  XR shoulder 2+ vw left        Orders Placed This Encounter   Procedures    Large joint arthrocentesis    XR shoulder 2+ vw left     Impression:  Left shoulder pain likely secondary to rotator cuff arthropathy. We discussed different treatment options and decided to proceed with a subacromial space steroid injection. He reported immediate relief. I will see him back in 6 weeks to reassess. Imaging Studies (I personally reviewed images in PACS and report):  Left shoulder x-rays most recent to this encounter reviewed. These images show chronic appearing Hill Sachs deformity. Mild AC joint osteoarthritis. No follow-ups on file. Patient is in agreement with the above plan. HPI:  Ton Schulte is a 69 y.o. male  who presents for evaluation of No chief complaint on file. Onset/Mechanism: Pain started a week ago after reaching a lot. Location: At the shoulder joint area. Radiation: Down the arm but mostly stays there. Provocative: Reaching activities. Severity: Painful. Associated Symptoms: Denies. Treatment so far: No recent treatment.     Following history reviewed and updated:  Past Medical History:   Diagnosis Date    Aortic aneurysm ([address])     Cardiac disease     Colon polyp     Coronary artery disease     Hypercholesteremia     Hypertension     Kidney stone     Myocardial infarction ([address])     5/04/2003: 2/8/2013    Osteoporosis 04/01/2023    pt reports     Past Surgical History:   Procedure Laterality Date    CARDIAC SURGERY      patient reports having 4 stents placed    COLONOSCOPY  07/2015    Due 7/2020    COLONOSCOPY      HERNIA REPAIR      LITHOTRIPSY      x4    OTHER SURGICAL HISTORY      stents    TONSILLECTOMY      UPPER GASTROINTESTINAL ENDOSCOPY       Social History   Social History     Substance and Sexual Activity   Alcohol Use Not Currently     Social History     Substance and Sexual Activity   Drug Use Never     Social History     Tobacco Use Smoking Status Former    Years: 0.00    Types: Cigarettes   Smokeless Tobacco Never   Tobacco Comments    smoked in high school     Family History   Problem Relation Age of Onset    Heart disease Father     Diabetes Paternal Uncle      No Known Allergies     Constitutional:  There were no vitals taken for this visit. General: NAD. Eyes: Anicteric sclerae. Neck: Supple. Lungs: Unlabored breathing. Cardiovascular: No lower extremity edema. Skin: Intact without erythema. Neurologic: Sensation intact to light touch. Psychiatric: Mood and affect are appropriate. Left Shoulder Exam     Tenderness   The patient is experiencing tenderness in the acromion. Range of Motion   Active abduction:  90 abnormal   Passive abduction:  normal   Forward flexion:  100 abnormal     Tests   Ling test: positive  Impingement: positive    Other   Erythema: absent  Scars: absent  Sensation: normal  Pulse: present              Large joint arthrocentesis: L subacromial bursa  Universal Protocol:  Consent: Verbal consent obtained. Written consent not obtained. Risks and benefits: risks, benefits and alternatives were discussed  Consent given by: patient  Timeout called at: 12/5/2023 2:45 PM.  Patient understanding: patient states understanding of the procedure being performed  Site marked: the operative site was marked  Patient identity confirmed: verbally with patient  Supporting Documentation  Indications: pain   Procedure Details  Location: shoulder - L subacromial bursa  Needle gauge: 21G 2''  Ultrasound guidance: no  Approach: posterolateral  Medications administered: 80 mg triamcinolone acetonide 40 mg/mL; 10 mL ropivacaine 0.5 %    Patient tolerance: patient tolerated the procedure well with no immediate complications  Dressing:  Sterile dressing applied    There was little to no resistance encountered during the injection. Risks of this procedure include:    - Risk of bleeding since a needle is involved.   - Risk of infection (1/10,000 chance as per recent studies). Signs/symptoms were discussed and they would prompt an urgent evaluation at an emergency department.  - Risk of pigmentation or skin dimpling in the skin (2-3% chance as per recent studies) from the steroid. - Risk of increased pain from steroid flare (1% chance as per recent studies) that typically lasts 24-48 hours. - Risk of increased blood sugars from the steroid medication that can last for a few weeks. If the patient is a diabetic or pre-diabetic, they were encouraged to closely monitor their blood sugars and discuss with PCP if elevated more than usual or if having symptoms. The benefits outweigh the risks and so the procedure was completed.

## 2023-12-22 ENCOUNTER — APPOINTMENT (EMERGENCY)
Dept: CT IMAGING | Facility: HOSPITAL | Age: 78
End: 2023-12-22
Payer: COMMERCIAL

## 2023-12-22 ENCOUNTER — APPOINTMENT (EMERGENCY)
Dept: RADIOLOGY | Facility: HOSPITAL | Age: 78
End: 2023-12-22
Payer: COMMERCIAL

## 2023-12-22 ENCOUNTER — HOSPITAL ENCOUNTER (EMERGENCY)
Facility: HOSPITAL | Age: 78
Discharge: HOME | End: 2023-12-22
Attending: EMERGENCY MEDICINE
Payer: COMMERCIAL

## 2023-12-22 VITALS
TEMPERATURE: 98.3 F | HEART RATE: 50 BPM | DIASTOLIC BLOOD PRESSURE: 76 MMHG | BODY MASS INDEX: 27.48 KG/M2 | OXYGEN SATURATION: 99 % | SYSTOLIC BLOOD PRESSURE: 152 MMHG | RESPIRATION RATE: 16 BRPM | WEIGHT: 186.07 LBS

## 2023-12-22 DIAGNOSIS — K21.9 GASTROESOPHAGEAL REFLUX DISEASE WITHOUT ESOPHAGITIS: ICD-10-CM

## 2023-12-22 DIAGNOSIS — R10.9 ABDOMINAL PAIN: Primary | ICD-10-CM

## 2023-12-22 DIAGNOSIS — I10 HYPERTENSION: ICD-10-CM

## 2023-12-22 LAB
2HR DELTA HS TROPONIN: -1 NG/L
ALBUMIN SERPL BCP-MCNC: 4.1 G/DL (ref 3.5–5)
ALP SERPL-CCNC: 99 U/L (ref 34–104)
ALT SERPL W P-5'-P-CCNC: 17 U/L (ref 7–52)
ANION GAP SERPL CALCULATED.3IONS-SCNC: 8 MMOL/L
AST SERPL W P-5'-P-CCNC: 24 U/L (ref 13–39)
BACTERIA UR QL AUTO: NORMAL /HPF
BASOPHILS # BLD AUTO: 0.04 THOUSANDS/ÂΜL (ref 0–0.1)
BASOPHILS NFR BLD AUTO: 0 % (ref 0–1)
BILIRUB SERPL-MCNC: 0.98 MG/DL (ref 0.2–1)
BILIRUB UR QL STRIP: NEGATIVE
BUN SERPL-MCNC: 16 MG/DL (ref 5–25)
CALCIUM SERPL-MCNC: 9.1 MG/DL (ref 8.4–10.2)
CARDIAC TROPONIN I PNL SERPL HS: 18 NG/L (ref 8–18)
CARDIAC TROPONIN I PNL SERPL HS: 20 NG/L
CARDIAC TROPONIN I PNL SERPL HS: 21 NG/L
CHLORIDE SERPL-SCNC: 107 MMOL/L (ref 96–108)
CLARITY UR: CLEAR
CO2 SERPL-SCNC: 25 MMOL/L (ref 21–32)
COLOR UR: ABNORMAL
CREAT SERPL-MCNC: 0.79 MG/DL (ref 0.6–1.3)
EOSINOPHIL # BLD AUTO: 0.08 THOUSAND/ÂΜL (ref 0–0.61)
EOSINOPHIL NFR BLD AUTO: 1 % (ref 0–6)
ERYTHROCYTE [DISTWIDTH] IN BLOOD BY AUTOMATED COUNT: 17.1 % (ref 11.6–15.1)
GFR SERPL CREATININE-BSD FRML MDRD: 86 ML/MIN/1.73SQ M
GLUCOSE SERPL-MCNC: 97 MG/DL (ref 65–140)
GLUCOSE UR STRIP-MCNC: NEGATIVE MG/DL
HCT VFR BLD AUTO: 42.3 % (ref 36.5–49.3)
HGB BLD-MCNC: 13.5 G/DL (ref 12–17)
HGB UR QL STRIP.AUTO: NEGATIVE
IMM GRANULOCYTES # BLD AUTO: 0.01 THOUSAND/UL (ref 0–0.2)
IMM GRANULOCYTES NFR BLD AUTO: 0 % (ref 0–2)
KETONES UR STRIP-MCNC: NEGATIVE MG/DL
LEUKOCYTE ESTERASE UR QL STRIP: NEGATIVE
LIPASE SERPL-CCNC: 22 U/L (ref 11–82)
LYMPHOCYTES # BLD AUTO: 2.7 THOUSANDS/ÂΜL (ref 0.6–4.47)
LYMPHOCYTES NFR BLD AUTO: 30 % (ref 14–44)
MCH RBC QN AUTO: 30.4 PG (ref 26.8–34.3)
MCHC RBC AUTO-ENTMCNC: 31.9 G/DL (ref 31.4–37.4)
MCV RBC AUTO: 95 FL (ref 82–98)
MONOCYTES # BLD AUTO: 0.88 THOUSAND/ÂΜL (ref 0.17–1.22)
MONOCYTES NFR BLD AUTO: 10 % (ref 4–12)
NEUTROPHILS # BLD AUTO: 5.2 THOUSANDS/ÂΜL (ref 1.85–7.62)
NEUTS SEG NFR BLD AUTO: 59 % (ref 43–75)
NITRITE UR QL STRIP: NEGATIVE
NON-SQ EPI CELLS URNS QL MICRO: NORMAL /HPF
NRBC BLD AUTO-RTO: 0 /100 WBCS
PH UR STRIP.AUTO: 6.5 [PH]
PLATELET # BLD AUTO: 284 THOUSANDS/UL (ref 149–390)
PMV BLD AUTO: 10.2 FL (ref 8.9–12.7)
POTASSIUM SERPL-SCNC: 4.4 MMOL/L (ref 3.5–5.3)
PROT SERPL-MCNC: 7.2 G/DL (ref 6.4–8.4)
PROT UR STRIP-MCNC: ABNORMAL MG/DL
RBC # BLD AUTO: 4.44 MILLION/UL (ref 3.88–5.62)
RBC #/AREA URNS AUTO: NORMAL /HPF
SODIUM SERPL-SCNC: 140 MMOL/L (ref 135–147)
SP GR UR STRIP.AUTO: >=1.05 (ref 1–1.03)
UROBILINOGEN UR STRIP-ACNC: <2 MG/DL
WBC # BLD AUTO: 8.91 THOUSAND/UL (ref 4.31–10.16)
WBC #/AREA URNS AUTO: NORMAL /HPF

## 2023-12-22 PROCEDURE — 74174 CTA ABD&PLVS W/CONTRAST: CPT

## 2023-12-22 PROCEDURE — G1004 CDSM NDSC: HCPCS

## 2023-12-22 PROCEDURE — 36415 COLL VENOUS BLD VENIPUNCTURE: CPT

## 2023-12-22 PROCEDURE — 71275 CT ANGIOGRAPHY CHEST: CPT

## 2023-12-22 PROCEDURE — 99285 EMERGENCY DEPT VISIT HI MDM: CPT

## 2023-12-22 PROCEDURE — 71045 X-RAY EXAM CHEST 1 VIEW: CPT

## 2023-12-22 PROCEDURE — 84484 ASSAY OF TROPONIN QUANT: CPT

## 2023-12-22 PROCEDURE — 93005 ELECTROCARDIOGRAM TRACING: CPT

## 2023-12-22 PROCEDURE — 80053 COMPREHEN METABOLIC PANEL: CPT

## 2023-12-22 PROCEDURE — 99285 EMERGENCY DEPT VISIT HI MDM: CPT | Performed by: EMERGENCY MEDICINE

## 2023-12-22 PROCEDURE — 96374 THER/PROPH/DIAG INJ IV PUSH: CPT

## 2023-12-22 PROCEDURE — 96361 HYDRATE IV INFUSION ADD-ON: CPT

## 2023-12-22 PROCEDURE — 83690 ASSAY OF LIPASE: CPT

## 2023-12-22 PROCEDURE — 81001 URINALYSIS AUTO W/SCOPE: CPT

## 2023-12-22 PROCEDURE — 85025 COMPLETE CBC W/AUTO DIFF WBC: CPT

## 2023-12-22 RX ORDER — PANTOPRAZOLE SODIUM 40 MG/1
40 TABLET, DELAYED RELEASE ORAL DAILY
Qty: 30 TABLET | Refills: 0 | Status: SHIPPED | OUTPATIENT
Start: 2023-12-22

## 2023-12-22 RX ORDER — FENTANYL CITRATE 50 UG/ML
50 INJECTION, SOLUTION INTRAMUSCULAR; INTRAVENOUS ONCE
Status: DISCONTINUED | OUTPATIENT
Start: 2023-12-22 | End: 2023-12-22

## 2023-12-22 RX ORDER — FAMOTIDINE 10 MG/ML
20 INJECTION, SOLUTION INTRAVENOUS ONCE
Status: COMPLETED | OUTPATIENT
Start: 2023-12-22 | End: 2023-12-22

## 2023-12-22 RX ADMIN — FAMOTIDINE 20 MG: 10 INJECTION INTRAVENOUS at 03:41

## 2023-12-22 RX ADMIN — IOHEXOL 100 ML: 350 INJECTION, SOLUTION INTRAVENOUS at 04:47

## 2023-12-22 RX ADMIN — SODIUM CHLORIDE 1000 ML: 0.9 INJECTION, SOLUTION INTRAVENOUS at 05:44

## 2023-12-22 NOTE — ED PROVIDER NOTES
"History  Chief Complaint   Patient presents with    Chest Pain     Pt has upper abd/low chest pain/SOB that started tonight, becoming worse when he got up to urinate tonight. Pt endorses cardiac hx, fall in January of this year that effects his back/abdominal muscles due to use of a back brace.      Patient is a 78-year-old male with past medical history significant for hypertension, dyslipidemia CAD status post stenting presenting to emergency department for evaluation of acute onset epigastric pain.  Patient states that he was going to use the restroom this morning when he had acute onset epigastric discomfort that lasted approximately 15 minutes.  He has difficulty describing the characteristic of the pain, noting it to be \"intense \".  He denies stabbing or ripping sensation.  Patient denies associated nausea, vomiting, diaphoresis.  Denies radiation of the pain to his upper back, arms, or neck.  Denies traveling of discomfort to lower abdomen.  Patient states this largely improved after approximately 15 minutes but he became concerned that something was wrong and requested transfer to the emergency department.  Patient is otherwise been in his normal state of health.  Denies any additional symptoms at this time.  Of note patient had a stress test in July 2022 which prompted gated SPECT significant for apical anterior hypokinesis with LVEF 45%.        Prior to Admission Medications   Prescriptions Last Dose Informant Patient Reported? Taking?   Aspirin Low Dose 81 MG EC tablet  Self No No   Sig: Take 1 tablet (81 mg total) by mouth daily   BinaxNOW COVID-19 Ag Home Test KIT  Self Yes No   Diclofenac Sodium (VOLTAREN) 1 %  Self No No   Sig: Apply 2 g topically 3 (three) times a day as needed (Pain)   Diclofenac Sodium (VOLTAREN) 1 %  Self No No   Sig: Apply 2 g topically 3 (three) times a day as needed (Pain)   Paxlovid, 300/100, tablet therapy pack  Self Yes No   Sig: TAKE 2 NIRMATRELVIR TABLETS AND 1 RITONAVIR " TABLET TOGETHER BY MOUTH TWICE DAILY FOR 5 DAYS   Patient not taking: Reported on 9/5/2023   acetaminophen-codeine (TYLENOL #3) 300-30 mg per tablet  Self Yes No   alendronate (FOSAMAX) 70 mg tablet  Self Yes No   Sig: Take 70 mg by mouth Once a week   allopurinol (ZYLOPRIM) 300 mg tablet  Self Yes No   Sig: Take 300 mg by mouth daily   atorvastatin (LIPITOR) 40 mg tablet  Self Yes No   Sig: Take 40 mg by mouth daily   chlorhexidine (PERIDEX) 0.12 % solution   Yes No   cholecalciferol (VITAMIN D3) 1,000 units tablet  Self Yes No   Sig: Take 1,000 Units by mouth daily   econazole nitrate 1 % cream   Yes No   famotidine (PEPCID) 20 mg tablet  Self No No   Sig: TAKE 1 TABLET BY MOUTH  TWICE DAILY   lidocaine (Lidoderm) 5 %  Self No No   Sig: Apply 1 patch topically over 12 hours daily Remove & Discard patch within 12 hours or as directed by MD   metoprolol succinate (TOPROL-XL) 25 mg 24 hr tablet  Self No No   Sig: Take 0.5 tablets (12.5 mg total) by mouth daily   naproxen (Naprosyn) 500 mg tablet   No No   Sig: Take 1 tablet (500 mg total) by mouth 2 (two) times a day with meals for 7 days   nitroglycerin (NITROSTAT) 0.4 mg SL tablet  Self Yes No   Sig: Place 1 tablet under the tongue every 5 (five) minutes as needed   pantoprazole (PROTONIX) 40 mg tablet  Self No No   Sig: Take 1 tablet (40 mg total) by mouth daily   pantoprazole (PROTONIX) 40 mg tablet   No Yes   Sig: Take 1 tablet (40 mg total) by mouth daily   ramipril (ALTACE) 5 mg capsule  Self No No   Sig: TAKE 1 CAPSULE BY MOUTH TWICE  DAILY   senna-docusate sodium (SENOKOT S) 8.6-50 mg per tablet  Self Yes No   Sig: Take 1 tablet by mouth 2 (two) times a day      Facility-Administered Medications: None       Past Medical History:   Diagnosis Date    Aortic aneurysm (HCC)     Cardiac disease     Colon polyp     Coronary artery disease     Hypercholesteremia     Hypertension     Kidney stone     Myocardial infarction (HCC)     5/04/2003: 2/8/2013     Osteoporosis 04/01/2023    pt reports       Past Surgical History:   Procedure Laterality Date    CARDIAC SURGERY      patient reports having 4 stents placed    COLONOSCOPY  07/2015    Due 7/2020    COLONOSCOPY      HERNIA REPAIR      LITHOTRIPSY      x4    OTHER SURGICAL HISTORY      stents    TONSILLECTOMY      UPPER GASTROINTESTINAL ENDOSCOPY         Family History   Problem Relation Age of Onset    Heart disease Father     Diabetes Paternal Uncle      I have reviewed and agree with the history as documented.    E-Cigarette/Vaping    E-Cigarette Use Never User      E-Cigarette/Vaping Substances    Nicotine No     THC No     CBD No     Flavoring No     Other No     Unknown No      Social History     Tobacco Use    Smoking status: Former     Types: Cigarettes    Smokeless tobacco: Never    Tobacco comments:     smoked in high school   Vaping Use    Vaping status: Never Used   Substance Use Topics    Alcohol use: Not Currently    Drug use: Never        Review of Systems   Constitutional: Negative.  Negative for diaphoresis and fever.   HENT: Negative.     Eyes: Negative.    Respiratory: Negative.  Negative for cough and shortness of breath.    Cardiovascular: Negative.  Negative for chest pain.   Gastrointestinal:  Positive for abdominal pain.   Endocrine: Negative.    Genitourinary: Negative.    Musculoskeletal: Negative.    Skin: Negative.    Allergic/Immunologic: Negative.    Neurological: Negative.    Hematological: Negative.    Psychiatric/Behavioral: Negative.     All other systems reviewed and are negative.      Physical Exam  ED Triage Vitals   Temperature Pulse Respirations Blood Pressure SpO2   12/22/23 0351 12/22/23 0254 12/22/23 0254 12/22/23 0254 12/22/23 0254   98.3 °F (36.8 °C) 65 20 (!) 176/86 99 %      Temp Source Heart Rate Source Patient Position - Orthostatic VS BP Location FiO2 (%)   12/22/23 0351 12/22/23 0254 12/22/23 0254 12/22/23 0254 --   Oral Monitor Lying Right arm       Pain Score        --                    Orthostatic Vital Signs  Vitals:    12/22/23 0254 12/22/23 0530 12/22/23 0730 12/22/23 0800   BP: (!) 176/86 150/73 (!) 173/82 152/76   Pulse: 65 (!) 49 (!) 52 (!) 50   Patient Position - Orthostatic VS: Lying Sitting  Sitting       Physical Exam  Vitals and nursing note reviewed.   Constitutional:       General: He is not in acute distress.     Appearance: Normal appearance. He is not ill-appearing, toxic-appearing or diaphoretic.   HENT:      Head: Normocephalic and atraumatic.   Eyes:      General: No scleral icterus.        Right eye: No discharge.         Left eye: No discharge.      Extraocular Movements: Extraocular movements intact.      Conjunctiva/sclera: Conjunctivae normal.   Cardiovascular:      Rate and Rhythm: Normal rate.      Pulses: Normal pulses.      Heart sounds: Normal heart sounds. No murmur heard.     No friction rub. No gallop.   Pulmonary:      Effort: Pulmonary effort is normal. No respiratory distress.      Breath sounds: Normal breath sounds. No stridor. No wheezing, rhonchi or rales.   Abdominal:      General: Abdomen is flat. Bowel sounds are normal. There is no distension.      Palpations: Abdomen is soft.      Tenderness: There is abdominal tenderness. There is no guarding or rebound.      Comments: Epigastric tenderness to palpation   Musculoskeletal:         General: No swelling. Normal range of motion.      Cervical back: Normal range of motion. No rigidity.      Right lower leg: No edema.      Left lower leg: No edema.   Skin:     General: Skin is warm and dry.      Capillary Refill: Capillary refill takes less than 2 seconds.      Coloration: Skin is not jaundiced.      Findings: No bruising or lesion.   Neurological:      General: No focal deficit present.      Mental Status: He is alert and oriented to person, place, and time. Mental status is at baseline.   Psychiatric:         Mood and Affect: Mood normal.         Behavior: Behavior normal.          Thought Content: Thought content normal.         Judgment: Judgment normal.         ED Medications  Medications   Famotidine (PF) (PEPCID) injection 20 mg (20 mg Intravenous Given 12/22/23 0341)   iohexol (OMNIPAQUE) 350 MG/ML injection (MULTI-DOSE) 100 mL (100 mL Intravenous Given 12/22/23 0447)   sodium chloride 0.9 % bolus 1,000 mL (0 mL Intravenous Stopped 12/22/23 0644)       Diagnostic Studies  Results Reviewed       Procedure Component Value Units Date/Time    HS Troponin I 2hr [[id number]]  (Normal) Collected: 12/22/23 0720    Lab Status: Final result Specimen: Blood from Line, Venous Updated: 12/22/23 0755     hs TnI 2hr 20 ng/L      Delta 2hr hsTnI -1 ng/L     Urine Microscopic [[id number]]  (Normal) Collected: 12/22/23 0640    Lab Status: Final result Specimen: Urine, Clean Catch Updated: 12/22/23 0744     RBC, UA None Seen /hpf      WBC, UA None Seen /hpf      Epithelial Cells Occasional /hpf      Bacteria, UA None Seen /hpf     UA w Reflex to Microscopic w Reflex to Culture [[id number]]  (Abnormal) Collected: 12/22/23 0640    Lab Status: Final result Specimen: Urine, Clean Catch Updated: 12/22/23 0743     Color, UA Light Yellow     Clarity, UA Clear     Specific Gravity, UA >=1.050     pH, UA 6.5     Leukocytes, UA Negative     Nitrite, UA Negative     Protein, UA Trace mg/dl      Glucose, UA Negative mg/dl      Ketones, UA Negative mg/dl      Urobilinogen, UA <2.0 mg/dl      Bilirubin, UA Negative     Occult Blood, UA Negative    High Sensitivity Troponin I Random [[id number]]  (Normal) Collected: 12/22/23 0702    Lab Status: Final result Specimen: Blood from Line, Venous Updated: 12/22/23 0735     HS TnI random 18 ng/L     HS Troponin 0hr (reflex protocol) [[id number]]  (Normal) Collected: 12/22/23 0332    Lab Status: Final result Specimen: Blood from Arm, Right Updated: 12/22/23 0408     hs TnI 0hr 21 ng/L     Comprehensive metabolic panel [[id number]] Collected: 12/22/23 0332    Lab Status: Final result  Specimen: Blood from Arm, Right Updated: 12/22/23 0400     Sodium 140 mmol/L      Potassium 4.4 mmol/L      Chloride 107 mmol/L      CO2 25 mmol/L      ANION GAP 8 mmol/L      BUN 16 mg/dL      Creatinine 0.79 mg/dL      Glucose 97 mg/dL      Calcium 9.1 mg/dL      AST 24 U/L      ALT 17 U/L      Alkaline Phosphatase 99 U/L      Total Protein 7.2 g/dL      Albumin 4.1 g/dL      Total Bilirubin 0.98 mg/dL      eGFR 86 ml/min/1.73sq m     Narrative:      National Kid[employer] Foundation guidelines for Chronic Kidney Disease (CKD):     Stage 1 with normal or high GFR (GFR > 90 mL/min/1.73 square meters)    Stage 2 Mild CKD (GFR = 60-89 mL/min/1.73 square meters)    Stage 3A Moderate CKD (GFR = 45-59 mL/min/1.73 square meters)    Stage 3B Moderate CKD (GFR = 30-44 mL/min/1.73 square meters)    Stage 4 Severe CKD (GFR = 15-29 mL/min/1.73 square meters)    Stage 5 End Stage CKD (GFR <15 mL/min/1.73 square meters)  Note: GFR calculation is accurate only with a steady state creatinine    Lipase [[id number]]  (Normal) Collected: 12/22/23 0332    Lab Status: Final result Specimen: Blood from Arm, Right Updated: 12/22/23 0400     Lipase 22 u/L     CBC and differential [[id number]]  (Abnormal) Collected: 12/22/23 0332    Lab Status: Final result Specimen: Blood from Arm, Right Updated: 12/22/23 0344     WBC 8.91 Thousand/uL      RBC 4.44 Million/uL      Hemoglobin 13.5 g/dL      Hematocrit 42.3 %      MCV 95 fL      MCH 30.4 pg      MCHC 31.9 g/dL      RDW 17.1 %      MPV 10.2 fL      Platelets 284 Thousands/uL      nRBC 0 /100 WBCs      Neutrophils Relative 59 %      Immat GRANS % 0 %      Lymphocytes Relative 30 %      Monocytes Relative 10 %      Eosinophils Relative 1 %      Basophils Relative 0 %      Neutrophils Absolute 5.20 Thousands/µL      Immature Grans Absolute 0.01 Thousand/uL      Lymphocytes Absolute 2.70 Thousands/µL      Monocytes Absolute 0.88 Thousand/µL      Eosinophils Absolute 0.08 Thousand/µL       Basophils Absolute 0.04 Thousands/µL                    CTA dissection protocol chest/abdomen/pelvis   Final Result by Xavier Colon MD (12/22 0523)      No aortic aneurysm, dissection or intramural hematoma.      No infiltrate or pleural effusion.      No acute intra-abdominal abnormality. No free air or free fluid.      Dense atherosclerotic calcifications coronary arteries in keeping with atherosclerotic heart disease.      Scattered colonic diverticulosis with no inflammatory changes present to suggest acute diverticulitis.      Severe chronic compression deformity of the T12 vertebral body. This finding is new when compared to a CTA abdomen/pelvis dated October 13, 2022.      Large hiatal hernia.      Workstation performed: [id number]         XR chest 1 view portable   ED Interpretation by Ulises Rodriguez DO (12/22 0909)   No effusion, infiltrate, pneumothorax            Procedures  ECG 12 Lead Documentation Only    Date/Time: 12/22/2023 8:38 AM    Performed by: Ulises Rodriguez DO  Authorized by: Ulises Rodriguez DO    Indications / Diagnosis:  Epigastric pain  ECG reviewed by me, the ED Provider: yes    Patient location:  ED  Previous ECG:     Previous ECG:  Compared to current    Similarity:  No change  Interpretation:     Interpretation: abnormal    Rate:     ECG rate assessment: normal    Rhythm:     Rhythm: sinus rhythm    Ectopy:     Ectopy: none    QRS:     QRS axis:  Normal  Conduction:     Conduction: abnormal      Abnormal conduction: complete RBBB and LAFB    ST segments:     ST segments:  Normal  T waves:     T waves: normal          ED Course  ED Course as of 12/22/23 0909   Fri Dec 22, 2023   0339 Blood Pressure(!): 176/86   0533 Comprehensive metabolic panel   0533 hs TnI 0hr: 21   0533 Lipase: 22   0533 CBC and differential(!)   0610 No aortic aneurysm, dissection or intramural hematoma.     No infiltrate or pleural effusion.     No acute intra-abdominal  abnormality. No free air or free fluid.     Dense atherosclerotic calcifications coronary arteries in keeping with atherosclerotic heart disease.     Scattered colonic diverticulosis with no inflammatory changes present to suggest acute diverticulitis.     Severe chronic compression deformity of the T12 vertebral body. This finding is new when compared to a CTA abdomen/pelvis dated October 13, 2022.     Large hiatal hernia.     0735 HS TnI random: 18   0752 UA w Reflex to Microscopic w Reflex to Culture(!)   0752 Urine Microscopic                             SBIRT 20yo+      Flowsheet Row Most Recent Value   Initial Alcohol Screen: US AUDIT-C     1. How often do you have a drink containing alcohol? 0 Filed at: 12/22/2023 0349   2. How many drinks containing alcohol do you have on a typical day you are drinking?  0 Filed at: 12/22/2023 0349   3a. Male UNDER 65: How often do you have five or more drinks on one occasion? 0 Filed at: 12/22/2023 0349   3b. FEMALE Any Age, or MALE 65+: How often do you have 4 or more drinks on one occassion? 0 Filed at: 12/22/2023 0349   Audit-C Score 0 Filed at: 12/22/2023 0349   CARLOS: How many times in the past year have you...    Used an illegal drug or used a prescription medication for non-medical reasons? Never Filed at: 12/22/2023 0349                  Medical Decision Making  Patient is a 78-year-old male presented to emergency department for evaluation of acute onset epigastric pain that is resolved upon arrival.  Patient examines well, in no acute distress.  He does have some mild epigastric pain to palpation but otherwise has a benign abdomen.  His EKG is without any acute ischemic changes from prior, compared to October 13, 2022.  Patient received dissection study which was remarkable only for chronic compression fracture.  Patient notes marked improvement with Pepcid administration, will recommend resuming Protonix which patient was on previously.  He is amenable to this  plan.  Second because of negative scan, doubt pneumothorax, doubt PE, doubt pericarditis, [place], ACS.  Instruct patient to follow-up, return precautions given.  Patient discharged.    Amount and/or Complexity of Data Reviewed  Labs: ordered. Decision-making details documented in ED Course.  Radiology: ordered.    Risk  Prescription drug management.          Disposition  Final diagnoses:   Abdominal pain   Hypertension     Time reflects when diagnosis was documented in both MDM as applicable and the Disposition within this note       Time User Action Codes Description Comment    12/22/2023  7:57 AM Ulises Rodriguez Add [K21.9] Gastroesophageal reflux disease without esophagitis     12/22/2023  7:57 AM Ulises Rodriguez Add [R10.9] Abdominal pain     12/22/2023  7:57 AM Ulises Rodriguez Add [I10] Hypertension           ED Disposition       ED Disposition   Discharge    Condition   Stable    Date/Time   Fri Dec 22, 2023 0750    Comment   Kiko Anthony discharge to home/self care.                   Follow-up Information       Follow up With Specialties Details Why Contact Info    Wallace Giang MD Internal Medicine In 1 week  [address] [place] [address]  [address] [place] [address]  [phone]              Discharge Medication List as of 12/22/2023  7:58 AM        CONTINUE these medications which have CHANGED    Details   pantoprazole (PROTONIX) 40 mg tablet Take 1 tablet (40 mg total) by mouth daily, Starting Fri 12/22/2023, Normal           CONTINUE these medications which have NOT CHANGED    Details   acetaminophen-codeine (TYLENOL #3) 300-30 mg per tablet Historical Med      alendronate (FOSAMAX) 70 mg tablet Take 70 mg by mouth Once a week, Starting Fri 4/14/2023, Until Sat 4/13/2024, Historical Med      allopurinol (ZYLOPRIM) 300 mg tablet Take 300 mg by mouth daily, Historical Med      Aspirin Low Dose 81 MG EC tablet Take 1 tablet (81 mg total) by mouth daily, Starting Thu 1/5/2023, Normal       atorvastatin (LIPITOR) 40 mg tablet Take 40 mg by mouth daily, Historical Med      BinaxNOW COVID-19 Ag Home Test KIT Historical Med      chlorhexidine (PERIDEX) 0.12 % solution Starting Wed 9/6/2023, Historical Med      cholecalciferol (VITAMIN D3) 1,000 units tablet Take 1,000 Units by mouth daily, Historical Med      !! Diclofenac Sodium (VOLTAREN) 1 % Apply 2 g topically 3 (three) times a day as needed (Pain), Starting Mon 3/13/2023, Normal      !! Diclofenac Sodium (VOLTAREN) 1 % Apply 2 g topically 3 (three) times a day as needed (Pain), Starting Mon 4/17/2023, Normal      econazole nitrate 1 % cream Starting Wed 9/20/2023, Historical Med      famotidine (PEPCID) 20 mg tablet TAKE 1 TABLET BY MOUTH  TWICE DAILY, Normal      lidocaine (Lidoderm) 5 % Apply 1 patch topically over 12 hours daily Remove & Discard patch within 12 hours or as directed by MD, Starting Mon 4/17/2023, Normal      metoprolol succinate (TOPROL-XL) 25 mg 24 hr tablet Take 0.5 tablets (12.5 mg total) by mouth daily, Starting Thu 1/5/2023, Normal      naproxen (Naprosyn) 500 mg tablet Take 1 tablet (500 mg total) by mouth 2 (two) times a day with meals for 7 days, Starting Wed 12/7/2022, Until Wed 12/14/2022, Normal      nitroglycerin (NITROSTAT) 0.4 mg SL tablet Place 1 tablet under the tongue every 5 (five) minutes as needed, Starting Wed 7/1/2020, Historical Med      Paxlovid, 300/100, tablet therapy pack TAKE 2 NIRMATRELVIR TABLETS AND 1 RITONAVIR TABLET TOGETHER BY MOUTH TWICE DAILY FOR 5 DAYS, Historical Med      ramipril (ALTACE) 5 mg capsule TAKE 1 CAPSULE BY MOUTH TWICE  DAILY, Normal      senna-docusate sodium (SENOKOT S) 8.6-50 mg per tablet Take 1 tablet by mouth 2 (two) times a day, Starting Fri 2/18/2022, Until Sat 2/18/2023, Historical Med       !! - Potential duplicate medications found. Please discuss with provider.        No discharge procedures on file.    PDMP Review         Value Time User    PDMP Reviewed  Yes  12/22/2023  2:44 AM Glynn Mejia MD             ED Provider  Attending physically available and evaluated Kiko Cruz. I managed the patient along with the ED Attending.    Electronically Signed by           Ulises Rodriguez,   12/22/23 0840       Ulises Rodriguez,   12/22/23 0840       Ulises Rodriguez, DO  12/22/23 0909

## 2023-12-22 NOTE — ED NOTES
Wife calls and states she will come to  pt.Wife states she will not come in to ER and will let registration know when she arrives. Wife requests that pt is dressed and brought out to her.      Lara Grajeda RN  12/22/23 0847

## 2023-12-22 NOTE — ED ATTENDING ATTESTATION
12/22/2023  I, Glynn Mejia MD, saw and evaluated the patient. I have discussed the patient with the resident/non-physician practitioner and agree with the resident's/non-physician practitioner's findings, Plan of Care, and MDM as documented in the resident's/non-physician practitioner's note, except where noted. All available labs and Radiology studies were reviewed.  I was present for key portions of any procedure(s) performed by the resident/non-physician practitioner and I was immediately available to provide assistance.       At this point I agree with the current assessment done in the Emergency Department.  I have conducted an independent evaluation of this patient a history and physical is as follows:  Patient is a 78 year old male with acute onset of lower chest/epigastric pain without radiation tonight. No prior episodes. No fever. No cough. No sob. No N/V/D. No GI bleeding. Has had prior hernia surgery. Has had coronary stenting. Was last seen at  Orthoped[facility] in [place] on 12/5/23 for chronic left shoulder pain. [employer] website checked on this patient and last Rx filled was on 12/9/22 for tylenol #3 for three days. Patient declines pain medication. NCAT. Moist mucous membranes. Lungs clear. Chest nontender. Heart regular without murmur. Abdomen with epigastric tenderness. Good bowel sounds. No guarding or rebound. No edema. No rash noted. No jaundice. DDX including but not limited to: ACS, MI, PE, PTX, pneumonia, dissection, pleurisy, pericarditis, myocarditis; doubt rhabdomyolysis. DDx including but not limited to: appendicitis, gastroenteritis, gastritis, PUD, GERD, gastroparesis, hepatitis, pancreatitis, colitis, enteritis, diverticulitis, food poisoning, epiploic appendagitis, mesenteric adenitis, mesenteric panniculitis, mesenteric ischemia, IBD, IBS, ileus, bowel obstruction, volvulus, internal hernia, cholecystitis, biliary colic, choledocholithiasis, perforated viscus, tumor, splenic  etiology, constipation, AAA, doubt testicular torsion; renal colic, pyelonephritis, UTI.  Will check EKG, CXR, labs and CT.     ED Course         Critical Care Time  Procedures

## 2023-12-24 LAB
ATRIAL RATE: 63 BPM
P AXIS: 19 DEGREES
PR INTERVAL: 192 MS
QRS AXIS: -64 DEGREES
QRSD INTERVAL: 130 MS
QT INTERVAL: 454 MS
QTC INTERVAL: 464 MS
T WAVE AXIS: 60 DEGREES
VENTRICULAR RATE: 63 BPM

## 2024-01-05 ENCOUNTER — OFFICE VISIT (OUTPATIENT)
Dept: PODIATRY | Facility: CLINIC | Age: 79
End: 2024-01-05
Payer: COMMERCIAL

## 2024-01-05 VITALS
SYSTOLIC BLOOD PRESSURE: 154 MMHG | WEIGHT: 185 LBS | BODY MASS INDEX: 27.4 KG/M2 | HEART RATE: 53 BPM | DIASTOLIC BLOOD PRESSURE: 90 MMHG | HEIGHT: 69 IN

## 2024-01-05 DIAGNOSIS — L84 CORNS: ICD-10-CM

## 2024-01-05 DIAGNOSIS — I73.9 PVD (PERIPHERAL VASCULAR DISEASE) (HCC): ICD-10-CM

## 2024-01-05 DIAGNOSIS — B35.1 ONYCHOMYCOSIS: Primary | ICD-10-CM

## 2024-01-05 PROCEDURE — 11721 DEBRIDE NAIL 6 OR MORE: CPT | Performed by: PODIATRIST

## 2024-01-05 PROCEDURE — 11055 PARING/CUTG B9 HYPRKER LES 1: CPT | Performed by: PODIATRIST

## 2024-01-05 NOTE — PROGRESS NOTES
Assessment/Plan:     The patient's clinical examination today is significant for thickened and dystrophic pedal nail plates with discoloration and subungual debris consistent with onychomycosis x10.  There are no open lesions.  There are callused lesions noted to the medial aspect of the hallux bilaterally, left worse than the right, as well as the distal aspects of toes 2,3 right foot.  The interdigital spaces are clear without maceration.  The pedal skin with decreased turgor.  There is an absence of hair growth.  Pedal pulses are palpable but diminished bilaterally.     The pedal nail plates are sharply debrided with a sterile nail clipper x10 without complication.  The nails were then reduced in thickness and girth utilizing a rotary bur.  The callus lesions were debrided with a rotary bur without complication. There are no other acute pedal issues.      Recommend follow-up in 4 months or as needed.        Diagnoses and all orders for this visit:    Onychomycosis    PVD (peripheral vascular disease) ([facility])          Subjective:     Patient ID: Kiko Cruz is a 78 y.o. male.    The patient presents today for follow-up of painful elongated pedal nail plates.  The patient states he has been doing well since his last visit, but notes that the nails are starting get too long once again.  He has no other acute pedal issues today.                Review of Systems   Constitutional: Negative.    HENT: Negative.     Eyes: Negative.    Respiratory: Negative.     Cardiovascular: Negative.    Endocrine: Negative.    Musculoskeletal: Negative.    Neurological: Negative.    Hematological: Negative.    Psychiatric/Behavioral: Negative.           Objective:     Physical Exam  Vitals reviewed.   Constitutional:       Appearance: Normal appearance.   HENT:      Head: Normocephalic and atraumatic.      Nose: Nose normal.   Eyes:      Conjunctiva/sclera: Conjunctivae normal.      Pupils: Pupils are equal, round, and reactive to  light.   Cardiovascular:      Pulses:           Dorsalis pedis pulses are 1+ on the right side and 1+ on the left side.        Posterior tibial pulses are 0 on the right side and 1+ on the left side.   Pulmonary:      Effort: Pulmonary effort is normal.   Feet:      Right foot:      Skin integrity: Callus present.      Toenail Condition: Right toenails are abnormally thick and long. Fungal disease present.     Left foot:      Toenail Condition: Left toenails are abnormally thick and long. Fungal disease present.     Comments: The patient's clinical examination today is significant for thickened and dystrophic pedal nail plates with discoloration and subungual debris consistent with onychomycosis x10.  There are no open lesions.  There are callused lesions noted to the medial aspect of the hallux bilaterally, left worse than the right, as well as the distal aspects of toes 2,3 right foot.  The interdigital spaces are clear without maceration.  The pedal skin with decreased turgor.  There is an absence of hair growth.  Pedal pulses are palpable but diminished bilaterally.  Skin:     General: Skin is warm.      Capillary Refill: Capillary refill takes 2 to 3 seconds.   Neurological:      General: No focal deficit present.      Mental Status: He is alert and oriented to person, place, and time.   Psychiatric:         Mood and Affect: Mood normal.         Behavior: Behavior normal.         Thought Content: Thought content normal.

## 2024-02-08 DIAGNOSIS — Z98.61 HISTORY OF PTCA: ICD-10-CM

## 2024-02-09 RX ORDER — METOPROLOL SUCCINATE 25 MG/1
37.5 TABLET, EXTENDED RELEASE ORAL DAILY
Qty: 45 TABLET | Refills: 3 | Status: SHIPPED | OUTPATIENT
Start: 2024-02-09

## 2024-02-14 DIAGNOSIS — I25.10 CORONARY ARTERY DISEASE INVOLVING NATIVE CORONARY ARTERY OF NATIVE HEART WITHOUT ANGINA PECTORIS: ICD-10-CM

## 2024-02-14 DIAGNOSIS — Z98.61 HISTORY OF PTCA: Primary | ICD-10-CM

## 2024-02-14 RX ORDER — ATORVASTATIN CALCIUM 40 MG/1
40 TABLET, FILM COATED ORAL DAILY
Qty: 90 TABLET | Refills: 1 | Status: SHIPPED | OUTPATIENT
Start: 2024-02-14

## 2024-02-16 DIAGNOSIS — Z98.61 HISTORY OF PTCA: ICD-10-CM

## 2024-02-16 DIAGNOSIS — I25.10 CORONARY ARTERY DISEASE INVOLVING NATIVE CORONARY ARTERY OF NATIVE HEART WITHOUT ANGINA PECTORIS: ICD-10-CM

## 2024-02-16 RX ORDER — ATORVASTATIN CALCIUM 40 MG/1
40 TABLET, FILM COATED ORAL DAILY
Qty: 90 TABLET | Refills: 1 | OUTPATIENT
Start: 2024-02-16

## 2024-02-19 DIAGNOSIS — Z98.61 HISTORY OF PTCA: ICD-10-CM

## 2024-02-19 RX ORDER — ASPIRIN 81 MG/1
81 TABLET, COATED ORAL DAILY
Qty: 90 TABLET | Refills: 3 | Status: CANCELLED | OUTPATIENT
Start: 2024-02-19

## 2024-02-22 ENCOUNTER — OFFICE VISIT (OUTPATIENT)
Dept: CARDIOLOGY CLINIC | Facility: CLINIC | Age: 79
End: 2024-02-22
Payer: COMMERCIAL

## 2024-02-22 VITALS
OXYGEN SATURATION: 98 % | HEIGHT: 69 IN | HEART RATE: 67 BPM | DIASTOLIC BLOOD PRESSURE: 70 MMHG | SYSTOLIC BLOOD PRESSURE: 146 MMHG | BODY MASS INDEX: 28.26 KG/M2 | WEIGHT: 190.8 LBS

## 2024-02-22 DIAGNOSIS — Z98.61 CAD S/P PERCUTANEOUS CORONARY ANGIOPLASTY: Primary | ICD-10-CM

## 2024-02-22 DIAGNOSIS — I25.10 CAD S/P PERCUTANEOUS CORONARY ANGIOPLASTY: Primary | ICD-10-CM

## 2024-02-22 DIAGNOSIS — I25.10 CORONARY ARTERY DISEASE INVOLVING NATIVE CORONARY ARTERY OF NATIVE HEART WITHOUT ANGINA PECTORIS: ICD-10-CM

## 2024-02-22 PROCEDURE — 99213 OFFICE O/P EST LOW 20 MIN: CPT | Performed by: INTERNAL MEDICINE

## 2024-02-22 RX ORDER — METOPROLOL SUCCINATE 25 MG/1
12.5 TABLET, EXTENDED RELEASE ORAL DAILY
Start: 2024-02-22

## 2024-02-22 RX ORDER — AMOXICILLIN 500 MG/1
TABLET, FILM COATED ORAL
COMMUNITY
Start: 2024-02-13

## 2024-02-22 NOTE — PATIENT INSTRUCTIONS
You were seen today in the Cardiology office for follow up. No medication changes were made today.  Please continue your current cardiac medications as prescribed.    Thank you for choosing [facility].

## 2024-02-22 NOTE — PROGRESS NOTES
[facility]    CHIEF COMPLAINT:   Chief Complaint   Patient presents with    Follow-up     HPI:  Kiko Cruz is a 78 y.o. male with a past medical history significant for polio, hypertension, dyslipidemia, coronary artery disease, history of MI and prior coronary stenting, bradycardia.  Briefly, he was admitted from 12/13/2022 - 10/14/2022 with complaints of epigastric and substernal discomfort.  He had associated nausea and diaphoresis.  His ECG was negative for acute ischemic changes.  High-sensitivity troponin negative x3.  CTA chest was performed and there was no evidence of dissection or aneurysm.  Notably, he had a similar presentation in June 2022 and I saw him in the hospital at that time.  Symptoms were felt to be GI related.  He followed up with his cardiologist, Dr. Smith.  He underwent pharmacologic NM stress imaging on 7/15/2022 which revealed a medium sized, moderate intensity, fixed defect of the apical anterior.  Gated SPECT revealed apical anterior hypokinesis with calculated LVEF 45%.  No ischemia reported.    OV - 7/11/23:  No lightheadedness, acute visual changes, chest pain, palpitations, shortness of breath, orthopnea, PND. +lower back pain. No medication changes.     Interval history: He was upset and felt I did not see him since 1 year. He didn't recall the office appointment in July 2023. He is doing well with no acute complaints. He was seen in the emergency department this past December for abdominal pain. Symptoms improved with famotidine. He denies any lightheadedness, visual changes, chest pain, palpitations, shortness of breath, orthopnea, PND.      The following portions of the patient's history were reviewed and updated as appropriate: allergies, current medications, past family history, past medical history, past social history, past surgical history, and problem list.    SINCE LAST OV I REVIEWED WITH THE PATIENT THE INTERIM LABS, TEST RESULTS,  CONSULTANT(S) NOTES AND PERFORMED AN INTERIM REVIEW OF HISTORY    Past Medical History:   Diagnosis Date    Aortic aneurysm (HCC)     Cardiac disease     Colon polyp     Coronary artery disease     Hypercholesteremia     Hypertension     Kidney stone     Myocardial infarction (HCC)     5/04/2003: 2/8/2013    Osteoporosis 04/01/2023    pt reports       Past Surgical History:   Procedure Laterality Date    CARDIAC SURGERY      patient reports having 4 stents placed    COLONOSCOPY  07/2015    Due 7/2020    COLONOSCOPY      HERNIA REPAIR      LITHOTRIPSY      x4    OTHER SURGICAL HISTORY      stents    TONSILLECTOMY      UPPER GASTROINTESTINAL ENDOSCOPY         Social History     Socioeconomic History    Marital status: /Civil Union     Spouse name: Not on file    Number of children: Not on file    Years of education: Not on file    Highest education level: Not on file   Occupational History    Not on file   Tobacco Use    Smoking status: Former     Types: Cigarettes    Smokeless tobacco: Never    Tobacco comments:     smoked in high school   Vaping Use    Vaping status: Never Used   Substance and Sexual Activity    Alcohol use: Not Currently    Drug use: Never    Sexual activity: Not Currently     Partners: Female   Other Topics Concern    Not on file   Social History Narrative    Not on file     Social Determinants of Health     Financial Resource Strain: Not on file   Food Insecurity: Not on file   Transportation Needs: Not on file   Physical Activity: Not on file   Stress: Not on file   Social Connections: Not on file   Intimate Partner Violence: Not on file   Housing Stability: Not on file       Family History   Problem Relation Age of Onset    Heart disease Father     Diabetes Paternal Uncle        No Known Allergies    Current Outpatient Medications   Medication Sig Dispense Refill    acetaminophen-codeine (TYLENOL #3) 300-30 mg per tablet       alendronate (FOSAMAX) 70 mg tablet Take 70 mg by mouth Once a  week      allopurinol (ZYLOPRIM) 300 mg tablet Take 300 mg by mouth daily      amoxicillin (AMOXIL) 500 MG tablet TAKE FOUR TS PO 1 HOUR B DAPP      Aspirin Low Dose 81 MG EC tablet Take 1 tablet (81 mg total) by mouth daily 90 tablet 3    atorvastatin (LIPITOR) 40 mg tablet Take 1 tablet (40 mg total) by mouth daily 90 tablet 1    BinaxNOW COVID-19 Ag Home Test KIT       cholecalciferol (VITAMIN D3) 1,000 units tablet Take 1,000 Units by mouth daily      Diclofenac Sodium (VOLTAREN) 1 % Apply 2 g topically 3 (three) times a day as needed (Pain) 100 g 1    econazole nitrate 1 % cream       famotidine (PEPCID) 20 mg tablet TAKE 1 TABLET BY MOUTH  TWICE DAILY 180 tablet 3    lidocaine (Lidoderm) 5 % Apply 1 patch topically over 12 hours daily Remove & Discard patch within 12 hours or as directed by MD 30 patch 1    metoprolol succinate (TOPROL-XL) 25 mg 24 hr tablet TAKE ONE-HALF TABLET BY MOUTH  DAILY 45 tablet 3    nitroglycerin (NITROSTAT) 0.4 mg SL tablet Place 1 tablet under the tongue every 5 (five) minutes as needed      pantoprazole (PROTONIX) 40 mg tablet Take 1 tablet (40 mg total) by mouth daily 30 tablet 0    ramipril (ALTACE) 5 mg capsule TAKE 1 CAPSULE BY MOUTH TWICE  DAILY 180 capsule 3    chlorhexidine (PERIDEX) 0.12 % solution  (Patient not taking: Reported on 2/22/2024)      Diclofenac Sodium (VOLTAREN) 1 % Apply 2 g topically 3 (three) times a day as needed (Pain) (Patient not taking: Reported on 2/22/2024) 150 g 3    naproxen (Naprosyn) 500 mg tablet Take 1 tablet (500 mg total) by mouth 2 (two) times a day with meals for 7 days 14 tablet 0    Paxlovid, 300/100, tablet therapy pack TAKE 2 NIRMATRELVIR TABLETS AND 1 RITONAVIR TABLET TOGETHER BY MOUTH TWICE DAILY FOR 5 DAYS (Patient not taking: Reported on 9/5/2023)      senna-docusate sodium (SENOKOT S) 8.6-50 mg per tablet Take 1 tablet by mouth 2 (two) times a day       No current facility-administered medications for this visit.       /70  "(BP Location: Left arm, Patient Position: Sitting, Cuff Size: Adult)   Pulse 67   Ht 5' 9\" (1.753 m)   Wt 86.5 kg (190 lb 12.8 oz)   SpO2 98%   BMI 28.18 kg/m²     Review of Systems   All other systems reviewed and are negative.      Physical Exam  Vitals reviewed.   Constitutional:       General: He is not in acute distress.     Appearance: Normal appearance. He is well-developed. He is not toxic-appearing.   HENT:      Head: Normocephalic and atraumatic.   Eyes:      General: No scleral icterus.     Conjunctiva/sclera: Conjunctivae normal.   Cardiovascular:      Rate and Rhythm: Normal rate and regular rhythm.      Pulses: Normal pulses.      Heart sounds: Normal heart sounds. No murmur heard.     No gallop.   Pulmonary:      Effort: Pulmonary effort is normal. No respiratory distress.      Breath sounds: Normal breath sounds. No wheezing or rales.   Abdominal:      General: Abdomen is flat. Bowel sounds are normal. There is no distension.      Palpations: Abdomen is soft.      Tenderness: There is no abdominal tenderness.   Skin:     General: Skin is warm and dry.      Capillary Refill: Capillary refill takes less than 2 seconds.      Coloration: Skin is not jaundiced.   Neurological:      Mental Status: He is alert.   Psychiatric:         Mood and Affect: Mood normal.          Lab Results   Component Value Date    K 4.4 12/22/2023     12/22/2023    CO2 25 12/22/2023    BUN 16 12/22/2023    CREATININE 0.79 12/22/2023    CALCIUM 9.1 12/22/2023    ALT 17 12/22/2023    AST 24 12/22/2023    INR 1.0 01/31/2022       Lab Results   Component Value Date    HDL 41 06/14/2022    LDLCALC 63 06/14/2022    TRIG 83 06/14/2022       Lab Results   Component Value Date    WBC 8.91 12/22/2023    HGB 13.5 12/22/2023    HCT 42.3 12/22/2023     12/22/2023       Lab Results   Component Value Date     12/04/2019    HGBA1C 5.5 12/04/2019     Cardiac studies:     ECG - 7/11/23: SB, RBBB, LAFB, septal infarct, " cannot rule out lateral infarct   ECG - 10/13/22: SB, RBBB, LAFB, septal infarct, lateral infarct    Pharmacologic NM stress -7/15/2022: revealed a medium sized, moderate intensity, fixed defect of the apical anterior.  Gated SPECT revealed apical anterior hypokinesis with calculated LVEF 45%.  No ischemia reported.    TTE - 6/14/22:    Left Ventricle: Left ventricular cavity size is normal. Wall thickness is normal. The left ventricular ejection fraction is 55%. Systolic function is normal. Diastolic function is mildly abnormal, consistent with grade I (abnormal) relaxation.    The following segments are hypokinetic: apical anterior, apical septal and apex.    All other segments are normal.    Left Atrium: The atrium is mildly dilated.    Mitral Valve: There is mild regurgitation.    Frequent PVC's are noted.    Wall motion abnormality matches area of infarct noted on prior stress nuclear study from 10/29/20      ASSESSMENT AND PLAN:  Kiko was seen today for follow-up.    Diagnoses and all orders for this visit:    #. CAD S/P percutaneous coronary angioplasty  #. Coronary artery disease involving native coronary artery of native heart without angina pectoris  -     metoprolol succinate (TOPROL-XL) 25 mg 24 hr tablet; Take 0.5 tablets (12.5 mg total) by mouth daily  Is a history of coronary artery disease and prior PCI/stents.  He had pharmacologic NM stress testing in July 2022 which demonstrated a medium sized, moderate intensity, fixed defect of the apical anterior.  Gated SPECT revealed apical anterior hypokinesis with calculated LVEF 45%.  No ischemia was reported.  He had an echocardiogram 1 month prior which showed preserved LVEF 55%, hypokinesis of the apical anterior, apical septal, and apex.    Lipid panel from 5/2023 shows total cholesterol 134, triglycerides 6 6, HDL 53, LDL 68.    Continue atorvastatin 40 mg and aspirin 81 mg daily.  He does have asymptomatic sinus bradycardia we can continue  metoprolol succinate 12.5 mg daily at this time.  If unable to tolerate, this can be discontinued given that his MI was > 10 years ago.  Can also consider long-acting nitrate or calcium channel blocker if he needs additional antianginal therapy.    Roderick Orr MD

## 2024-03-06 DIAGNOSIS — Z98.61 HISTORY OF PTCA: ICD-10-CM

## 2024-03-06 RX ORDER — ASPIRIN 81 MG/1
81 TABLET, COATED ORAL DAILY
Qty: 90 TABLET | Refills: 3 | Status: SHIPPED | OUTPATIENT
Start: 2024-03-06

## 2024-03-28 ENCOUNTER — TELEPHONE (OUTPATIENT)
Dept: PODIATRY | Facility: CLINIC | Age: 79
End: 2024-03-28

## 2024-04-15 ENCOUNTER — APPOINTMENT (OUTPATIENT)
Dept: LAB | Facility: HOSPITAL | Age: 79
End: 2024-04-15
Payer: COMMERCIAL

## 2024-04-15 DIAGNOSIS — C61 MALIGNANT NEOPLASM OF PROSTATE (HCC): ICD-10-CM

## 2024-04-15 LAB — PSA SERPL-MCNC: 5.7 NG/ML (ref 0–4)

## 2024-04-15 PROCEDURE — G0103 PSA SCREENING: HCPCS

## 2024-04-15 PROCEDURE — 36415 COLL VENOUS BLD VENIPUNCTURE: CPT

## 2024-04-17 DIAGNOSIS — I10 ESSENTIAL HYPERTENSION: Chronic | ICD-10-CM

## 2024-04-17 RX ORDER — RAMIPRIL 5 MG/1
CAPSULE ORAL
Qty: 180 CAPSULE | Refills: 1 | Status: SHIPPED | OUTPATIENT
Start: 2024-04-17

## 2024-05-21 ENCOUNTER — OFFICE VISIT (OUTPATIENT)
Dept: PODIATRY | Facility: CLINIC | Age: 79
End: 2024-05-21
Payer: COMMERCIAL

## 2024-05-21 VITALS
WEIGHT: 190 LBS | BODY MASS INDEX: 28.14 KG/M2 | SYSTOLIC BLOOD PRESSURE: 149 MMHG | DIASTOLIC BLOOD PRESSURE: 90 MMHG | OXYGEN SATURATION: 100 % | HEART RATE: 71 BPM | HEIGHT: 69 IN

## 2024-05-21 DIAGNOSIS — L84 CORNS: ICD-10-CM

## 2024-05-21 DIAGNOSIS — I73.9 PVD (PERIPHERAL VASCULAR DISEASE) (HCC): ICD-10-CM

## 2024-05-21 DIAGNOSIS — B35.1 ONYCHOMYCOSIS: Primary | ICD-10-CM

## 2024-05-21 PROCEDURE — 11056 PARNG/CUTG B9 HYPRKR LES 2-4: CPT | Performed by: PODIATRIST

## 2024-05-21 PROCEDURE — 11721 DEBRIDE NAIL 6 OR MORE: CPT | Performed by: PODIATRIST

## 2024-05-21 NOTE — PROGRESS NOTES
Assessment/Plan:     The patient's clinical examination today is significant for thickened and dystrophic pedal nail plates with discoloration and subungual debris consistent with onychomycosis x10.  There are no open lesions.  There are callused lesions noted to the medial aspect of the hallux bilaterally, left worse than the right.  The interdigital spaces are clear without maceration.  The pedal skin with decreased turgor.  There is an absence of hair growth.  Pedal pulses are palpable but diminished bilaterally.     The pedal nail plates are sharply debrided with a sterile nail clipper x10 without complication.  The nails were then reduced in thickness and girth utilizing a rotary bur.  The callused lesions were debrided with a sterile #15 without complication x 2. There are no other acute pedal issues.       Recommend follow-up in 4 months or as needed.     Diagnoses and all orders for this visit:    Onychomycosis    Corns    PVD (peripheral vascular disease) ([facility])          Subjective:     Patient ID: Kiko Cruz is a 78 y.o. male.    The patient presents today for follow-up of painful elongated pedal nail plates.  The patient states he has been doing well since his last visit, but notes that the nails are starting get too long once again.  He also notes calluses on his feet which he attributes to not wearing socks.  He has no other acute pedal issues today.           PAST MEDICAL HISTORY:  Past Medical History:   Diagnosis Date    Aortic aneurysm (HCC)     Cardiac disease     Colon polyp     Coronary artery disease     Hypercholesteremia     Hypertension     Kidney stone     Myocardial infarction (HCC)     5/04/2003: 2/8/2013    Osteoporosis 04/01/2023    pt reports       PAST SURGICAL HISTORY:  Past Surgical History:   Procedure Laterality Date    CARDIAC SURGERY      patient reports having 4 stents placed    COLONOSCOPY  07/2015    Due 7/2020    COLONOSCOPY      HERNIA REPAIR      LITHOTRIPSY      x4     OTHER SURGICAL HISTORY      stents    TONSILLECTOMY      UPPER GASTROINTESTINAL ENDOSCOPY          ALLERGIES:  Patient has no known allergies.    MEDICATIONS:  Current Outpatient Medications   Medication Sig Dispense Refill    acetaminophen-codeine (TYLENOL #3) 300-30 mg per tablet       alendronate (FOSAMAX) 70 mg tablet Take 70 mg by mouth Once a week      allopurinol (ZYLOPRIM) 300 mg tablet Take 300 mg by mouth daily      Aspirin Low Dose 81 MG EC tablet Take 1 tablet (81 mg total) by mouth daily 90 tablet 3    atorvastatin (LIPITOR) 40 mg tablet Take 1 tablet (40 mg total) by mouth daily 90 tablet 1    BinaxNOW COVID-19 Ag Home Test KIT       cholecalciferol (VITAMIN D3) 1,000 units tablet Take 1,000 Units by mouth daily      Diclofenac Sodium (VOLTAREN) 1 % Apply 2 g topically 3 (three) times a day as needed (Pain) 100 g 1    econazole nitrate 1 % cream       famotidine (PEPCID) 20 mg tablet TAKE 1 TABLET BY MOUTH  TWICE DAILY 180 tablet 3    lidocaine (Lidoderm) 5 % Apply 1 patch topically over 12 hours daily Remove & Discard patch within 12 hours or as directed by MD 30 patch 1    metoprolol succinate (TOPROL-XL) 25 mg 24 hr tablet Take 0.5 tablets (12.5 mg total) by mouth daily      nitroglycerin (NITROSTAT) 0.4 mg SL tablet Place 1 tablet under the tongue every 5 (five) minutes as needed      pantoprazole (PROTONIX) 40 mg tablet Take 1 tablet (40 mg total) by mouth daily 30 tablet 0    ramipril (ALTACE) 5 mg capsule TAKE 1 CAPSULE BY MOUTH TWICE  DAILY 180 capsule 1    amoxicillin (AMOXIL) 500 MG tablet TAKE FOUR TS PO 1 HOUR B DAPP (Patient not taking: Reported on 5/21/2024)      chlorhexidine (PERIDEX) 0.12 % solution  (Patient not taking: Reported on 2/22/2024)      Diclofenac Sodium (VOLTAREN) 1 % Apply 2 g topically 3 (three) times a day as needed (Pain) (Patient not taking: Reported on 2/22/2024) 150 g 3    naproxen (Naprosyn) 500 mg tablet Take 1 tablet (500 mg total) by mouth 2 (two) times a day  with meals for 7 days 14 tablet 0    Paxlovid, 300/100, tablet therapy pack TAKE 2 NIRMATRELVIR TABLETS AND 1 RITONAVIR TABLET TOGETHER BY MOUTH TWICE DAILY FOR 5 DAYS (Patient not taking: Reported on 9/5/2023)      senna-docusate sodium (SENOKOT S) 8.6-50 mg per tablet Take 1 tablet by mouth 2 (two) times a day       No current facility-administered medications for this visit.       SOCIAL HISTORY:  Social History     Socioeconomic History    Marital status: /Civil Union     Spouse name: None    Number of children: None    Years of education: None    Highest education level: None   Occupational History    None   Tobacco Use    Smoking status: Former     Types: Cigarettes    Smokeless tobacco: Never    Tobacco comments:     smoked in high school   Vaping Use    Vaping status: Never Used   Substance and Sexual Activity    Alcohol use: Not Currently    Drug use: Never    Sexual activity: Not Currently     Partners: Female   Other Topics Concern    None   Social History Narrative    None     Social Determinants of Health     Financial Resource Strain: Not on file   Food Insecurity: Not on file   Transportation Needs: Not on file   Physical Activity: Not on file   Stress: Not on file   Social Connections: Not on file   Intimate Partner Violence: Not on file   Housing Stability: Not on file        Review of Systems   Constitutional: Negative.    HENT: Negative.     Eyes: Negative.    Respiratory: Negative.     Cardiovascular: Negative.    Endocrine: Negative.    Musculoskeletal: Negative.    Neurological: Negative.    Hematological: Negative.    Psychiatric/Behavioral: Negative.           Objective:     Physical Exam  Vitals reviewed.   Constitutional:       Appearance: Normal appearance.   HENT:      Head: Normocephalic and atraumatic.      Nose: Nose normal.   Eyes:      Conjunctiva/sclera: Conjunctivae normal.      Pupils: Pupils are equal, round, and reactive to light.   Cardiovascular:      Pulses:            "Dorsalis pedis pulses are 1+ on the right side and 1+ on the left side.        Posterior tibial pulses are 0 on the right side and 0 on the left side.   Pulmonary:      Effort: Pulmonary effort is normal.   Feet:      Right foot:      Skin integrity: Callus present.      Toenail Condition: Right toenails are abnormally thick and long. Fungal disease present.     Left foot:      Skin integrity: Callus present.      Toenail Condition: Left toenails are abnormally thick and long. Fungal disease present.     Comments: The patient's clinical examination today is significant for thickened and dystrophic pedal nail plates with discoloration and subungual debris consistent with onychomycosis x10.  There are no open lesions.  There are callused lesions noted to the medial aspect of the hallux bilaterally, left worse than the right.  The interdigital spaces are clear without maceration.  The pedal skin with decreased turgor.  There is an absence of hair growth.  Pedal pulses are palpable but diminished bilaterally.     Skin:     General: Skin is warm.      Capillary Refill: Capillary refill takes 2 to 3 seconds.   Neurological:      General: No focal deficit present.      Mental Status: He is alert and oriented to person, place, and time.   Psychiatric:         Mood and Affect: Mood normal.         Behavior: Behavior normal.         Thought Content: Thought content normal.         Lesion Destruction    Date/Time: 5/21/2024 10:45 AM    Performed by: Alonso Pate DPM  Authorized by: Alonso Pate DPM  Universal Protocol:  Consent: Verbal consent obtained.  Risks and benefits: risks, benefits and alternatives were discussed  Consent given by: patient  Time out: Immediately prior to procedure a \"time out\" was called to verify the correct patient, procedure, equipment, support staff and site/side marked as required.  Timeout called at: 5/21/2024 10:45 AM.  Patient understanding: patient states understanding of the " procedure being performed  Patient consent: the patient's understanding of the procedure matches consent given  Patient identity confirmed: verbally with patient and provided demographic data    Procedure Details - Lesion Destruction:     Number of Lesions:  2  Lesion 1:     Body area:  Lower extremity    Lower extremity location:  R foot    Malignancy: benign hyperkeratotic lesion      Destruction method: scissors used for extraction    Lesion 2:     Body area:  Lower extremity    Lower extremity location:  L foot    Malignancy: benign hyperkeratotic lesion      Destruction method: scissors used for extraction

## 2024-06-27 DIAGNOSIS — Z98.61 HISTORY OF PTCA: ICD-10-CM

## 2024-06-27 DIAGNOSIS — I25.10 CORONARY ARTERY DISEASE INVOLVING NATIVE CORONARY ARTERY OF NATIVE HEART WITHOUT ANGINA PECTORIS: ICD-10-CM

## 2024-06-28 RX ORDER — ATORVASTATIN CALCIUM 40 MG/1
40 TABLET, FILM COATED ORAL DAILY
Qty: 90 TABLET | Refills: 0 | Status: SHIPPED | OUTPATIENT
Start: 2024-06-28

## 2024-06-28 NOTE — TELEPHONE ENCOUNTER
Patient needs updated blood work. Please place orders.     Mail order pharmacy - [facility] courtesy refill was provided.   Total Cholesterol within 360 days    LDL within 360 days    HDL within 360 days    Triglycerides within 360 days

## 2024-08-16 ENCOUNTER — APPOINTMENT (OUTPATIENT)
Dept: LAB | Facility: HOSPITAL | Age: 79
End: 2024-08-16
Attending: SPECIALIST
Payer: COMMERCIAL

## 2024-08-16 DIAGNOSIS — C61 MALIGNANT NEOPLASM OF PROSTATE (HCC): ICD-10-CM

## 2024-08-16 LAB — PSA SERPL-MCNC: 5.85 NG/ML (ref 0–4)

## 2024-08-16 PROCEDURE — 36415 COLL VENOUS BLD VENIPUNCTURE: CPT

## 2024-08-16 PROCEDURE — G0103 PSA SCREENING: HCPCS

## 2024-09-06 ENCOUNTER — OFFICE VISIT (OUTPATIENT)
Dept: CARDIOLOGY CLINIC | Facility: CLINIC | Age: 79
End: 2024-09-06
Payer: COMMERCIAL

## 2024-09-06 VITALS
HEART RATE: 70 BPM | TEMPERATURE: 100.1 F | SYSTOLIC BLOOD PRESSURE: 132 MMHG | DIASTOLIC BLOOD PRESSURE: 60 MMHG | HEIGHT: 69 IN | WEIGHT: 189 LBS | BODY MASS INDEX: 27.99 KG/M2 | OXYGEN SATURATION: 98 %

## 2024-09-06 DIAGNOSIS — Z98.61 S/P CORONARY ANGIOPLASTY: ICD-10-CM

## 2024-09-06 DIAGNOSIS — I10 ESSENTIAL HYPERTENSION: Chronic | ICD-10-CM

## 2024-09-06 DIAGNOSIS — I25.10 CORONARY ARTERY DISEASE INVOLVING NATIVE CORONARY ARTERY OF NATIVE HEART WITHOUT ANGINA PECTORIS: Primary | ICD-10-CM

## 2024-09-06 PROCEDURE — 99214 OFFICE O/P EST MOD 30 MIN: CPT | Performed by: INTERNAL MEDICINE

## 2024-09-06 NOTE — PROGRESS NOTES
[facility]    CHIEF COMPLAINT:   Chief Complaint   Patient presents with    Follow-up     HPI:  Kiko Cruz is a 79 y.o. male with a past medical history significant for polio, hypertension, dyslipidemia, coronary artery disease, history of MI and prior coronary stenting, bradycardia.  Briefly, he was admitted from 12/13/2022 - 10/14/2022 with complaints of epigastric and substernal discomfort.  He had associated nausea and diaphoresis.  His ECG was negative for acute ischemic changes.  High-sensitivity troponin negative x3.  CTA chest was performed and there was no evidence of dissection or aneurysm.  Notably, he had a similar presentation in June 2022 and I saw him in the hospital at that time.  Symptoms were felt to be GI related.  He followed up with his cardiologist, Dr. Smith.  He underwent pharmacologic NM stress imaging on 7/15/2022 which revealed a medium sized, moderate intensity, fixed defect of the apical anterior.  Gated SPECT revealed apical anterior hypokinesis with calculated LVEF 45%.  No ischemia reported.    OV - 7/11/23:  No lightheadedness, acute visual changes, chest pain, palpitations, shortness of breath, orthopnea, PND. +lower back pain. No medication changes.     OV - 2/22/24: He was upset and felt I did not see him since 1 year. He didn't recall the office appointment in July 2023. He is doing well with no acute complaints. He was seen in the emergency department this past December for abdominal pain. Symptoms improved with famotidine.     Interval history: Presents today for follow up. He complains of cough and sneezing. He denies any sore throat, rhinorrhea, lightheadedness, chest pain, palpitations, shortness of breath, orthopnea. Uses cane for ambulation.    The following portions of the patient's history were reviewed and updated as appropriate: allergies, current medications, past family history, past medical history, past social history, past  surgical history, and problem list.    SINCE LAST OV I REVIEWED WITH THE PATIENT THE INTERIM LABS, TEST RESULTS, CONSULTANT(S) NOTES AND PERFORMED AN INTERIM REVIEW OF HISTORY    Past Medical History:   Diagnosis Date    Aortic aneurysm (HCC)     Cardiac disease     Colon polyp     Coronary artery disease     Hypercholesteremia     Hypertension     Kidney stone     Myocardial infarction (HCC)     5/04/2003: 2/8/2013    Osteoporosis 04/01/2023    pt reports       Past Surgical History:   Procedure Laterality Date    CARDIAC SURGERY      patient reports having 4 stents placed    COLONOSCOPY  07/2015    Due 7/2020    COLONOSCOPY      HERNIA REPAIR      LITHOTRIPSY      x4    OTHER SURGICAL HISTORY      stents    TONSILLECTOMY      UPPER GASTROINTESTINAL ENDOSCOPY         Social History     Socioeconomic History    Marital status: /Civil Union     Spouse name: Not on file    Number of children: Not on file    Years of education: Not on file    Highest education level: Not on file   Occupational History    Not on file   Tobacco Use    Smoking status: Former     Types: Cigarettes    Smokeless tobacco: Never    Tobacco comments:     smoked in high school   Vaping Use    Vaping status: Never Used   Substance and Sexual Activity    Alcohol use: Not Currently    Drug use: Never    Sexual activity: Not Currently     Partners: Female   Other Topics Concern    Not on file   Social History Narrative    Not on file     Social Determinants of Health     Financial Resource Strain: Not on file   Food Insecurity: Not on file   Transportation Needs: Not on file   Physical Activity: Not on file   Stress: Not on file   Social Connections: Not on file   Intimate Partner Violence: Not on file   Housing Stability: Not on file       Family History   Problem Relation Age of Onset    Heart disease Father     Diabetes Paternal Uncle        No Known Allergies    Current Outpatient Medications   Medication Sig Dispense Refill     allopurinol (ZYLOPRIM) 300 mg tablet Take 300 mg by mouth daily      Aspirin Low Dose 81 MG EC tablet Take 1 tablet (81 mg total) by mouth daily 90 tablet 3    atorvastatin (LIPITOR) 40 mg tablet TAKE 1 TABLET BY MOUTH DAILY 90 tablet 0    cholecalciferol (VITAMIN D3) 1,000 units tablet Take 1,000 Units by mouth daily      Diclofenac Sodium (VOLTAREN) 1 % Apply 2 g topically 3 (three) times a day as needed (Pain) 100 g 1    famotidine (PEPCID) 20 mg tablet TAKE 1 TABLET BY MOUTH  TWICE DAILY 180 tablet 3    lidocaine (Lidoderm) 5 % Apply 1 patch topically over 12 hours daily Remove & Discard patch within 12 hours or as directed by MD 30 patch 1    metoprolol succinate (TOPROL-XL) 25 mg 24 hr tablet Take 0.5 tablets (12.5 mg total) by mouth daily      nitroglycerin (NITROSTAT) 0.4 mg SL tablet Place 1 tablet under the tongue every 5 (five) minutes as needed      pantoprazole (PROTONIX) 40 mg tablet Take 1 tablet (40 mg total) by mouth daily 30 tablet 0    ramipril (ALTACE) 5 mg capsule TAKE 1 CAPSULE BY MOUTH TWICE  DAILY 180 capsule 1    acetaminophen-codeine (TYLENOL #3) 300-30 mg per tablet  (Patient not taking: Reported on 9/6/2024)      alendronate (FOSAMAX) 70 mg tablet Take 70 mg by mouth Once a week      amoxicillin (AMOXIL) 500 MG tablet TAKE FOUR TS PO 1 HOUR B DAPP (Patient not taking: Reported on 5/21/2024)      chlorhexidine (PERIDEX) 0.12 % solution  (Patient not taking: Reported on 2/22/2024)      Diclofenac Sodium (VOLTAREN) 1 % Apply 2 g topically 3 (three) times a day as needed (Pain) (Patient not taking: Reported on 2/22/2024) 150 g 3    econazole nitrate 1 % cream       naproxen (Naprosyn) 500 mg tablet Take 1 tablet (500 mg total) by mouth 2 (two) times a day with meals for 7 days 14 tablet 0    senna-docusate sodium (SENOKOT S) 8.6-50 mg per tablet Take 1 tablet by mouth 2 (two) times a day       No current facility-administered medications for this visit.       /60 (BP Location: Left arm,  "Patient Position: Sitting, Cuff Size: Standard)   Pulse 70   Temp 100.1 °F (37.8 °C)   Ht 5' 9\" (1.753 m)   Wt 85.7 kg (189 lb)   SpO2 98%   BMI 27.91 kg/m²     Review of Systems   All other systems reviewed and are negative.      Physical Exam  Vitals reviewed.   Constitutional:       General: He is not in acute distress.     Appearance: He is well-developed. He is not toxic-appearing.   HENT:      Head: Normocephalic and atraumatic.   Eyes:      General: No scleral icterus.  Cardiovascular:      Rate and Rhythm: Normal rate and regular rhythm.      Pulses: Normal pulses.      Heart sounds: Normal heart sounds. No murmur heard.     No gallop.   Pulmonary:      Effort: Pulmonary effort is normal. No respiratory distress.      Breath sounds: Normal breath sounds. No wheezing or rales.   Abdominal:      General: Abdomen is flat. Bowel sounds are normal. There is no distension.      Palpations: Abdomen is soft.      Tenderness: There is no abdominal tenderness.   Skin:     General: Skin is warm and dry.      Coloration: Skin is not jaundiced.   Neurological:      Mental Status: He is alert.   Psychiatric:         Mood and Affect: Mood normal.          Lab Results   Component Value Date    K 4.4 12/22/2023     12/22/2023    CO2 25 12/22/2023    BUN 16 12/22/2023    CREATININE 0.79 12/22/2023    CALCIUM 9.1 12/22/2023    ALT 17 12/22/2023    AST 24 12/22/2023    INR 1.0 01/31/2022       Lab Results   Component Value Date    HDL 41 06/14/2022    LDLCALC 63 06/14/2022    TRIG 83 06/14/2022       Lab Results   Component Value Date    WBC 8.91 12/22/2023    HGB 13.5 12/22/2023    HCT 42.3 12/22/2023     12/22/2023       Lab Results   Component Value Date     12/04/2019    HGBA1C 5.5 12/04/2019     Cardiac studies:     ECG - 7/11/23: SB, RBBB, LAFB, septal infarct, cannot rule out lateral infarct   ECG - 10/13/22: SB, RBBB, LAFB, septal infarct, lateral infarct    Pharmacologic NM stress -7/15/2022: " revealed a medium sized, moderate intensity, fixed defect of the apical anterior.  Gated SPECT revealed apical anterior hypokinesis with calculated LVEF 45%.  No ischemia reported.    TTE - 6/14/22:    Left Ventricle: Left ventricular cavity size is normal. Wall thickness is normal. The left ventricular ejection fraction is 55%. Systolic function is normal. Diastolic function is mildly abnormal, consistent with grade I (abnormal) relaxation.    The following segments are hypokinetic: apical anterior, apical septal and apex.    All other segments are normal.    Left Atrium: The atrium is mildly dilated.    Mitral Valve: There is mild regurgitation.    Frequent PVC's are noted.    Wall motion abnormality matches area of infarct noted on prior stress nuclear study from 10/29/20      ASSESSMENT AND PLAN:  Kiko was seen today for follow-up.    Diagnoses and all orders for this visit:    #. Coronary artery disease involving native coronary artery of native heart without angina pectoris  #. S/P coronary angioplasty: History of coronary artery disease and prior PCI/stents.  He had pharmacologic NM stress testing in July 2022 which demonstrated a medium sized, moderate intensity, fixed defect of the apical anterior.  Gated SPECT revealed apical anterior hypokinesis with calculated LVEF 45%.  No ischemia was reported.  He had an echocardiogram 1 month prior which showed preserved LVEF 55%, hypokinesis of the apical anterior, apical septal, and apex.  -Stable  -No anginal complaints  -LDL 68 on last labs  -Continue aspirin 81 mg daily, atorvastatin 40 mg daily, metoprolol succinate 12.5 mg    #. Essential hypertension: His blood pressure is well-controlled  -Continue low sodium diet  -Continue ramipril 5 mg  -Also taking metoprolol succinate but further up titration limited due to bradycardia     Roderick Orr MD

## 2024-09-20 DIAGNOSIS — I10 ESSENTIAL HYPERTENSION: Chronic | ICD-10-CM

## 2024-09-23 RX ORDER — RAMIPRIL 5 MG/1
CAPSULE ORAL
Qty: 180 CAPSULE | Refills: 1 | Status: SHIPPED | OUTPATIENT
Start: 2024-09-23

## 2024-09-25 ENCOUNTER — HOSPITAL ENCOUNTER (OUTPATIENT)
Dept: RADIOLOGY | Facility: HOSPITAL | Age: 79
Discharge: HOME | End: 2024-09-25
Payer: COMMERCIAL

## 2024-09-25 ENCOUNTER — OFFICE VISIT (OUTPATIENT)
Dept: OBGYN CLINIC | Facility: CLINIC | Age: 79
End: 2024-09-25
Payer: COMMERCIAL

## 2024-09-25 VITALS — HEART RATE: 83 BPM | HEIGHT: 69 IN | BODY MASS INDEX: 28.44 KG/M2 | OXYGEN SATURATION: 94 % | WEIGHT: 192 LBS

## 2024-09-25 DIAGNOSIS — R22.31 MASS OF FINGER OF RIGHT HAND: ICD-10-CM

## 2024-09-25 DIAGNOSIS — R22.31 MASS OF FINGER OF RIGHT HAND: Primary | ICD-10-CM

## 2024-09-25 PROCEDURE — 73140 X-RAY EXAM OF FINGER(S): CPT

## 2024-09-25 PROCEDURE — 99214 OFFICE O/P EST MOD 30 MIN: CPT | Performed by: PHYSICIAN ASSISTANT

## 2024-09-25 NOTE — PROGRESS NOTES
ASSESSMENT/PLAN:    Assessment:   Right long finger chronic ulcer  Right long finger distal phalanx bone loss  PVD, concern for osteomyelitis      Follow Up:  Dr. Dietz for a surgical consult            _____________________________________________________  CHIEF COMPLAINT:  Chief Complaint   Patient presents with    Right Middle Finger - Mass         SUBJECTIVE:  Kiko Cruz is a 79 y.o. male who presents with an ulcer of the right long finger.  This started several month(s) ago: Insidious.  He noticed that little by little his nail is shortening. Former smoker.  History includes PVD and prostate CA. No fevers or chills.  No pain in the finger.  Radiation: None  Previous Treatments: None   Associated symptoms: None  Handedness: right  Work status: retired    PAST MEDICAL HISTORY:  Past Medical History:   Diagnosis Date    Aortic aneurysm (HCC)     Cardiac disease     Colon polyp     Coronary artery disease     Hypercholesteremia     Hypertension     Kidney stone     Myocardial infarction (HCC)     5/04/2003: 2/8/2013    Osteoporosis 04/01/2023    pt reports       PAST SURGICAL HISTORY:  Past Surgical History:   Procedure Laterality Date    CARDIAC SURGERY      patient reports having 4 stents placed    COLONOSCOPY  07/2015    Due 7/2020    COLONOSCOPY      HERNIA REPAIR      LITHOTRIPSY      x4    OTHER SURGICAL HISTORY      stents    TONSILLECTOMY      UPPER GASTROINTESTINAL ENDOSCOPY         FAMILY HISTORY:  Family History   Problem Relation Age of Onset    Heart disease Father     Diabetes Paternal Uncle        SOCIAL HISTORY:  Social History     Tobacco Use    Smoking status: Former     Types: Cigarettes    Smokeless tobacco: Never    Tobacco comments:     smoked in high school   Vaping Use    Vaping status: Never Used   Substance Use Topics    Alcohol use: Not Currently    Drug use: Never       MEDICATIONS:    Current Outpatient Medications:     acetaminophen-codeine (TYLENOL #3) 300-30 mg per tablet,  , Disp: , Rfl:     alendronate (FOSAMAX) 70 mg tablet, Take 70 mg by mouth Once a week, Disp: , Rfl:     allopurinol (ZYLOPRIM) 300 mg tablet, Take 300 mg by mouth daily, Disp: , Rfl:     amoxicillin (AMOXIL) 500 MG tablet, TAKE FOUR TS PO 1 HOUR B DAPP (Patient not taking: Reported on 5/21/2024), Disp: , Rfl:     Aspirin Low Dose 81 MG EC tablet, Take 1 tablet (81 mg total) by mouth daily, Disp: 90 tablet, Rfl: 3    atorvastatin (LIPITOR) 40 mg tablet, TAKE 1 TABLET BY MOUTH DAILY, Disp: 90 tablet, Rfl: 0    chlorhexidine (PERIDEX) 0.12 % solution, , Disp: , Rfl:     cholecalciferol (VITAMIN D3) 1,000 units tablet, Take 1,000 Units by mouth daily, Disp: , Rfl:     Diclofenac Sodium (VOLTAREN) 1 %, Apply 2 g topically 3 (three) times a day as needed (Pain) (Patient not taking: Reported on 2/22/2024), Disp: 150 g, Rfl: 3    Diclofenac Sodium (VOLTAREN) 1 %, Apply 2 g topically 3 (three) times a day as needed (Pain), Disp: 100 g, Rfl: 1    econazole nitrate 1 % cream, , Disp: , Rfl:     famotidine (PEPCID) 20 mg tablet, TAKE 1 TABLET BY MOUTH  TWICE DAILY, Disp: 180 tablet, Rfl: 3    lidocaine (Lidoderm) 5 %, Apply 1 patch topically over 12 hours daily Remove & Discard patch within 12 hours or as directed by MD, Disp: 30 patch, Rfl: 1    metoprolol succinate (TOPROL-XL) 25 mg 24 hr tablet, Take 0.5 tablets (12.5 mg total) by mouth daily, Disp: , Rfl:     naproxen (Naprosyn) 500 mg tablet, Take 1 tablet (500 mg total) by mouth 2 (two) times a day with meals for 7 days, Disp: 14 tablet, Rfl: 0    nitroglycerin (NITROSTAT) 0.4 mg SL tablet, Place 1 tablet under the tongue every 5 (five) minutes as needed, Disp: , Rfl:     pantoprazole (PROTONIX) 40 mg tablet, Take 1 tablet (40 mg total) by mouth daily, Disp: 30 tablet, Rfl: 0    ramipril (ALTACE) 5 mg capsule, TAKE 1 CAPSULE BY MOUTH TWICE  DAILY, Disp: 180 capsule, Rfl: 1    senna-docusate sodium (SENOKOT S) 8.6-50 mg per tablet, Take 1 tablet by mouth 2 (two) times a  "day, Disp: , Rfl:     ALLERGIES:  No Known Allergies    REVIEW OF SYSTEMS:  Pertinent items are noted in HPI.  A comprehensive review of systems was negative.    LABS:  HgA1c:   Lab Results   Component Value Date    HGBA1C 5.5 12/04/2019     BMP:   Lab Results   Component Value Date    CALCIUM 9.1 12/22/2023    K 4.4 12/22/2023    CO2 25 12/22/2023     12/22/2023    BUN 16 12/22/2023    CREATININE 0.79 12/22/2023         _____________________________________________________  PHYSICAL EXAMINATION:  Vital signs: Pulse 83   Ht 5' 9\" (1.753 m)   Wt 87.1 kg (192 lb)   SpO2 94%   BMI 28.35 kg/m²   General: well developed and well nourished, alert, oriented times 3, and appears comfortable  Psychiatric: Normal  HEENT: Trachea Midline, No torticollis  Cardiovascular: Regular rate and rhythm  Pulmonary: No audible wheezing   Abdomen: No guarding  Extremities: No lymphedema  Skin: No masses, erythema, lacerations, fluctation, ulcerations  Neurovascular: Motor Intact to the Median, Ulnar, Radial Nerve and Pulses Intact    MUSCULOSKELETAL EXAMINATION:  Right long finger: Shortening of the distal phalanx. Ulcer of the tip. Some arthritic deformity of the DIP noted. No erythema or induration.      Note: the right ring finger also has a shortened distal phalanx.  This is from a traumatic injury 60 years ago at [employer].        _____________________________________________________  STUDIES REVIEWED:  Xray demonstrates bone loss of the distal phalanx - concern for osteomyelitis / PVD      PROCEDURES PERFORMED:  Procedures  No Procedures performed today    "

## 2024-09-30 DIAGNOSIS — I25.10 CORONARY ARTERY DISEASE INVOLVING NATIVE CORONARY ARTERY OF NATIVE HEART WITHOUT ANGINA PECTORIS: ICD-10-CM

## 2024-09-30 DIAGNOSIS — Z98.61 HISTORY OF PTCA: ICD-10-CM

## 2024-09-30 RX ORDER — METOPROLOL SUCCINATE 25 MG/1
12.5 TABLET, EXTENDED RELEASE ORAL DAILY
Qty: 45 TABLET | Refills: 3 | Status: SHIPPED | OUTPATIENT
Start: 2024-09-30 | End: 2024-10-04 | Stop reason: SDUPTHER

## 2024-09-30 RX ORDER — ATORVASTATIN CALCIUM 40 MG/1
40 TABLET, FILM COATED ORAL DAILY
Qty: 90 TABLET | Refills: 0 | Status: SHIPPED | OUTPATIENT
Start: 2024-09-30

## 2024-09-30 NOTE — TELEPHONE ENCOUNTER
----- Message from Kerri RIOJAS sent at 9/30/2024  8:42 AM EDT -----  Patient stating that his Metoprolol was canceled and is questioning if he needs a new script sent to hospitals PHARMACY for the med.  Please call @ [phone].  Patient is a patient of Dr. Orr

## 2024-10-04 ENCOUNTER — OFFICE VISIT (OUTPATIENT)
Dept: OBGYN CLINIC | Facility: CLINIC | Age: 79
End: 2024-10-04
Payer: COMMERCIAL

## 2024-10-04 VITALS — BODY MASS INDEX: 28.17 KG/M2 | WEIGHT: 190.2 LBS | HEIGHT: 69 IN

## 2024-10-04 DIAGNOSIS — M15.1 OSTEOARTHRITIS OF DISTAL INTERPHALANGEAL (DIP) JOINT OF RIGHT MIDDLE FINGER: ICD-10-CM

## 2024-10-04 DIAGNOSIS — L98.491 SKIN ULCER OF FINGER, LIMITED TO BREAKDOWN OF SKIN (HCC): Primary | ICD-10-CM

## 2024-10-04 DIAGNOSIS — I73.9 PVD (PERIPHERAL VASCULAR DISEASE) (HCC): ICD-10-CM

## 2024-10-04 PROCEDURE — 99214 OFFICE O/P EST MOD 30 MIN: CPT | Performed by: STUDENT IN AN ORGANIZED HEALTH CARE EDUCATION/TRAINING PROGRAM

## 2024-10-04 RX ORDER — METOPROLOL SUCCINATE 25 MG/1
12.5 TABLET, EXTENDED RELEASE ORAL DAILY
Qty: 45 TABLET | Refills: 3 | Status: SHIPPED | OUTPATIENT
Start: 2024-10-04

## 2024-10-04 NOTE — TELEPHONE ENCOUNTER
[facility] requesting a new script for :    Reason for call:   [x] Refill   [] Prior Auth  [] Other:     Office:   [] PCP/Provider -   [x] Specialty/Provider - Cardiology    Medication: metoprolol succinate (TOPROL-XL) 25 mg 24 hr tablet     Dose/Frequency: Take 0.5 tablets (12.5 mg total) by mouth daily,     Quantity: 45 tablet    Pharmacy:     [facility] ([facility] - [place], [place] - [address] [place] [address] [phone]     Does the patient have enough for 3 days?   [] Yes   [x] No - Send as HP to POD'

## 2024-10-04 NOTE — PROGRESS NOTES
ORTHOPAEDIC HAND, WRIST, AND ELBOW OFFICE  VISIT      ASSESSMENT/PLAN:      Diagnoses and all orders for this visit:    Skin ulcer of finger, limited to breakdown of skin (HCC)    PVD (peripheral vascular disease) (HCC)          79 y.o. male with ulcer and bone loss of the right long finger.  Explained he has a wound at the tip of the finger. Explained poor tissue perfusion could be causing his wound to heal slower. The swelling is secondary to his finger trying to heal.  We discussed non-surgical (monitor his wound and give it time to heal) vs surgical intervention (amputation of the finger to give the wound another chance to heal). Patient wishes to proceed with non-surgical treatment.  Treatment options and expected outcomes were discussed.  The patient verbalized understanding of exam findings and treatment plan.   The patient was given the opportunity to ask questions.  Questions were answered to the patient's satisfaction.      Follow Up:  7-10 days      To Do Next Visit:   Wound check      Discussions:  Finger tip wound - He should keep the wound covered with neosporin and Band-Aid. If there is any redness or drainage, he should call the office immediately.       Sandeep Dietz MD  Attending, Orthopaedic Surgery  Hand, Wrist, and Elbow Surgery  [facility]    ______________________________________________________________________________________________    CHIEF COMPLAINT:  Chief Complaint   Patient presents with    Right Middle Finger - Pain       SUBJECTIVE:  Patient is a 79 y.o. RHD male who presents today at the request of Sage Sawyer PA-C for evaluation and treatment of right long finger chronic wound     Patient reports 20 years ago a nodule on right long finger DIP joint appeared. He reports his nail started to disappear in the beginning of 2024 and he trimmed a long piece of hard skin he trimmed 3-4 weeks ago with the wound at the tip of finger with swelling. No drainage. No  antibiotic treatment.    Patient has a history of traumatic right ring finger DIP amputation in 1969 at [facility].     Patient smoked in high school but reports he is currently not a smoker.      Occupation: Retired     I have personally reviewed all the relevant PMH, PSH, SH, FH, Medications and allergies      PAST MEDICAL HISTORY:  Past Medical History:   Diagnosis Date    Aortic aneurysm (HCC)     Cardiac disease     Colon polyp     Coronary artery disease     Hypercholesteremia     Hypertension     Kidney stone     Myocardial infarction (HCC)     5/04/2003: 2/8/2013    Osteoporosis 04/01/2023    pt reports       PAST SURGICAL HISTORY:  Past Surgical History:   Procedure Laterality Date    CARDIAC SURGERY      patient reports having 4 stents placed    COLONOSCOPY  07/2015    Due 7/2020    COLONOSCOPY      HERNIA REPAIR      LITHOTRIPSY      x4    OTHER SURGICAL HISTORY      stents    TONSILLECTOMY      UPPER GASTROINTESTINAL ENDOSCOPY         FAMILY HISTORY:  Family History   Problem Relation Age of Onset    Heart disease Father     Diabetes Paternal Uncle        SOCIAL HISTORY:  Social History     Tobacco Use    Smoking status: Former     Types: Cigarettes    Smokeless tobacco: Never    Tobacco comments:     smoked in high school   Vaping Use    Vaping status: Never Used   Substance Use Topics    Alcohol use: Not Currently    Drug use: Never       MEDICATIONS:    Current Outpatient Medications:     allopurinol (ZYLOPRIM) 300 mg tablet, Take 300 mg by mouth daily, Disp: , Rfl:     Aspirin Low Dose 81 MG EC tablet, Take 1 tablet (81 mg total) by mouth daily, Disp: 90 tablet, Rfl: 3    atorvastatin (LIPITOR) 40 mg tablet, Take 1 tablet (40 mg total) by mouth daily, Disp: 90 tablet, Rfl: 0    cholecalciferol (VITAMIN D3) 1,000 units tablet, Take 1,000 Units by mouth daily, Disp: , Rfl:     Diclofenac Sodium (VOLTAREN) 1 %, Apply 2 g topically 3 (three) times a day as needed (Pain), Disp: 100 g, Rfl: 1     econazole nitrate 1 % cream, , Disp: , Rfl:     famotidine (PEPCID) 20 mg tablet, TAKE 1 TABLET BY MOUTH  TWICE DAILY, Disp: 180 tablet, Rfl: 3    lidocaine (Lidoderm) 5 %, Apply 1 patch topically over 12 hours daily Remove & Discard patch within 12 hours or as directed by MD, Disp: 30 patch, Rfl: 1    metoprolol succinate (TOPROL-XL) 25 mg 24 hr tablet, Take 0.5 tablets (12.5 mg total) by mouth daily, Disp: 45 tablet, Rfl: 3    nitroglycerin (NITROSTAT) 0.4 mg SL tablet, Place 1 tablet under the tongue every 5 (five) minutes as needed, Disp: , Rfl:     pantoprazole (PROTONIX) 40 mg tablet, Take 1 tablet (40 mg total) by mouth daily, Disp: 30 tablet, Rfl: 0    ramipril (ALTACE) 5 mg capsule, TAKE 1 CAPSULE BY MOUTH TWICE  DAILY, Disp: 180 capsule, Rfl: 1    acetaminophen-codeine (TYLENOL #3) 300-30 mg per tablet, , Disp: , Rfl:     alendronate (FOSAMAX) 70 mg tablet, Take 70 mg by mouth Once a week, Disp: , Rfl:     amoxicillin (AMOXIL) 500 MG tablet, TAKE FOUR TS PO 1 HOUR B DAPP (Patient not taking: Reported on 5/21/2024), Disp: , Rfl:     chlorhexidine (PERIDEX) 0.12 % solution, , Disp: , Rfl:     Diclofenac Sodium (VOLTAREN) 1 %, Apply 2 g topically 3 (three) times a day as needed (Pain) (Patient not taking: Reported on 2/22/2024), Disp: 150 g, Rfl: 3    naproxen (Naprosyn) 500 mg tablet, Take 1 tablet (500 mg total) by mouth 2 (two) times a day with meals for 7 days, Disp: 14 tablet, Rfl: 0    senna-docusate sodium (SENOKOT S) 8.6-50 mg per tablet, Take 1 tablet by mouth 2 (two) times a day, Disp: , Rfl:     ALLERGIES:  No Known Allergies        REVIEW OF SYSTEMS:  Musculoskeletal:        As noted in HPI.   All other systems reviewed and are negative.    VITALS:  There were no vitals filed for this visit.    LABS:  HgA1c:   Lab Results   Component Value Date    HGBA1C 5.5 12/04/2019     BMP:   Lab Results   Component Value Date    CALCIUM 9.1 12/22/2023    K 4.4 12/22/2023    CO2 25 12/22/2023      12/22/2023    BUN 16 12/22/2023    CREATININE 0.79 12/22/2023       _____________________________________________________  PHYSICAL EXAMINATION:  General: Well developed and well nourished, alert & oriented x 3, appears comfortable  Psychiatric: Normal  HEENT: Normocephalic, Atraumatic Trachea Midline, No torticollis  Pulmonary: No audible wheezing or respiratory distress   Abdomen/GI: Non tender, non distended   Cardiovascular: No pitting edema, 2+ radial pulse   Skin: ulcer at tip of long finger.  Neurovascular: Sensation Intact to the Median, Ulnar, Radial Nerve, Motor Intact to the Median, Ulnar, Radial Nerve, and Pulses Intact  Musculoskeletal: Normal, except as noted in detailed exam and in HPI.      MUSCULOSKELETAL EXAMINATION:  Edema of the right long finger.  Wound 12 mm long x 12 mm wide  DIP flexion deformity  Erythremia red tissue and granular tissue without drainage  Mild swelling to tip of finger  Good ROM of digit    ___________________________________________________  STUDIES REVIEWED:  Xrays of the right long finger from 09/25/2024 were reviewed and independently interpreted in PACS by Dr. Dietz and demonstrate shortening of the index, long and ring fingers with degenerative changes noted in the long finger DIP joint with deformity. No evidence of osteomyelitis on xray.        PROCEDURES PERFORMED:  Procedures  No Procedures performed today    _____________________________________________________      Scribe Attestation      I,:  Anjelica Gipson am acting as a scribe while in the presence of the attending physician.:       I,:  Sandeep Dietz MD personally performed the services described in this documentation    as scribed in my presence.:

## 2024-10-11 ENCOUNTER — OFFICE VISIT (OUTPATIENT)
Dept: OBGYN CLINIC | Facility: CLINIC | Age: 79
End: 2024-10-11
Payer: COMMERCIAL

## 2024-10-11 VITALS — HEIGHT: 69 IN | WEIGHT: 190.2 LBS | BODY MASS INDEX: 28.17 KG/M2

## 2024-10-11 DIAGNOSIS — L98.491 SKIN ULCER OF FINGER, LIMITED TO BREAKDOWN OF SKIN (HCC): Primary | ICD-10-CM

## 2024-10-11 PROCEDURE — 99213 OFFICE O/P EST LOW 20 MIN: CPT | Performed by: STUDENT IN AN ORGANIZED HEALTH CARE EDUCATION/TRAINING PROGRAM

## 2024-10-11 NOTE — PROGRESS NOTES
ORTHOPAEDIC HAND, WRIST, AND ELBOW OFFICE  VISIT      ASSESSMENT/PLAN:      Diagnoses and all orders for this visit:    Skin ulcer of finger, limited to breakdown of skin (HCC)          79 y.o. male with right long finger ulcer, suspect bone loss on x-ray is chronic, no obvious bone exposed on examination today    Discussed with the patient the wound is likely secondary to poor blood flow. On exam, there is no erythema or signs of infection. Discussed we can continue to monitor versus surgical intervention in the form of amputation. Discussed there is a chance this can cause possible osteomyelitis. The patient would like to continue to monitor at this time. We will continue to monitor this closely. Updated clinical images were taken and uploaded into the patient's chart.     Follow Up:  10-14 days      To Do Next Visit:  Re-evaluation of current issue        Sandeep Dietz MD  Attending, Orthopaedic Surgery  Hand, Wrist, and Elbow Surgery  [facility]    ______________________________________________________________________________________________    CHIEF COMPLAINT:  Chief Complaint   Patient presents with    Right Middle Finger - Pain, Follow-up       SUBJECTIVE:  Patient is a 79 y.o. RHD male who presents today for follow up of right long finger ulcer and bone loss. The patient states since his last visit the wound has closed up a lot. He has been keeping it covered with a band-aid.     Occupation: Retired      I have personally reviewed all the relevant PMH, PSH, SH, FH, Medications and allergies      PAST MEDICAL HISTORY:  Past Medical History:   Diagnosis Date    Aortic aneurysm (HCC)     Cardiac disease     Colon polyp     Coronary artery disease     Hypercholesteremia     Hypertension     Kidney stone     Myocardial infarction (HCC)     5/04/2003: 2/8/2013    Osteoporosis 04/01/2023    pt reports       PAST SURGICAL HISTORY:  Past Surgical History:   Procedure Laterality Date     CARDIAC SURGERY      patient reports having 4 stents placed    COLONOSCOPY  07/2015    Due 7/2020    COLONOSCOPY      HERNIA REPAIR      LITHOTRIPSY      x4    OTHER SURGICAL HISTORY      stents    TONSILLECTOMY      UPPER GASTROINTESTINAL ENDOSCOPY         FAMILY HISTORY:  Family History   Problem Relation Age of Onset    Heart disease Father     Diabetes Paternal Uncle        SOCIAL HISTORY:  Social History     Tobacco Use    Smoking status: Former     Types: Cigarettes    Smokeless tobacco: Never    Tobacco comments:     smoked in high school   Vaping Use    Vaping status: Never Used   Substance Use Topics    Alcohol use: Not Currently    Drug use: Never       MEDICATIONS:    Current Outpatient Medications:     allopurinol (ZYLOPRIM) 300 mg tablet, Take 300 mg by mouth daily, Disp: , Rfl:     Aspirin Low Dose 81 MG EC tablet, Take 1 tablet (81 mg total) by mouth daily, Disp: 90 tablet, Rfl: 3    atorvastatin (LIPITOR) 40 mg tablet, Take 1 tablet (40 mg total) by mouth daily, Disp: 90 tablet, Rfl: 0    cholecalciferol (VITAMIN D3) 1,000 units tablet, Take 1,000 Units by mouth daily, Disp: , Rfl:     Diclofenac Sodium (VOLTAREN) 1 %, Apply 2 g topically 3 (three) times a day as needed (Pain), Disp: 100 g, Rfl: 1    econazole nitrate 1 % cream, , Disp: , Rfl:     famotidine (PEPCID) 20 mg tablet, TAKE 1 TABLET BY MOUTH  TWICE DAILY, Disp: 180 tablet, Rfl: 3    lidocaine (Lidoderm) 5 %, Apply 1 patch topically over 12 hours daily Remove & Discard patch within 12 hours or as directed by MD, Disp: 30 patch, Rfl: 1    metoprolol succinate (TOPROL-XL) 25 mg 24 hr tablet, Take 0.5 tablets (12.5 mg total) by mouth daily, Disp: 45 tablet, Rfl: 3    nitroglycerin (NITROSTAT) 0.4 mg SL tablet, Place 1 tablet under the tongue every 5 (five) minutes as needed, Disp: , Rfl:     pantoprazole (PROTONIX) 40 mg tablet, Take 1 tablet (40 mg total) by mouth daily, Disp: 30 tablet, Rfl: 0    ramipril (ALTACE) 5 mg capsule, TAKE 1  CAPSULE BY MOUTH TWICE  DAILY, Disp: 180 capsule, Rfl: 1    acetaminophen-codeine (TYLENOL #3) 300-30 mg per tablet, , Disp: , Rfl:     alendronate (FOSAMAX) 70 mg tablet, Take 70 mg by mouth Once a week, Disp: , Rfl:     amoxicillin (AMOXIL) 500 MG tablet, TAKE FOUR TS PO 1 HOUR B DAPP (Patient not taking: Reported on 5/21/2024), Disp: , Rfl:     chlorhexidine (PERIDEX) 0.12 % solution, , Disp: , Rfl:     Diclofenac Sodium (VOLTAREN) 1 %, Apply 2 g topically 3 (three) times a day as needed (Pain) (Patient not taking: Reported on 2/22/2024), Disp: 150 g, Rfl: 3    naproxen (Naprosyn) 500 mg tablet, Take 1 tablet (500 mg total) by mouth 2 (two) times a day with meals for 7 days, Disp: 14 tablet, Rfl: 0    senna-docusate sodium (SENOKOT S) 8.6-50 mg per tablet, Take 1 tablet by mouth 2 (two) times a day, Disp: , Rfl:     ALLERGIES:  No Known Allergies        REVIEW OF SYSTEMS:  Musculoskeletal:        As noted in HPI.   All other systems reviewed and are negative.    VITALS:  There were no vitals filed for this visit.    LABS:  HgA1c:   Lab Results   Component Value Date    HGBA1C 5.5 12/04/2019     BMP:   Lab Results   Component Value Date    CALCIUM 9.1 12/22/2023    K 4.4 12/22/2023    CO2 25 12/22/2023     12/22/2023    BUN 16 12/22/2023    CREATININE 0.79 12/22/2023       _____________________________________________________  PHYSICAL EXAMINATION:  General: Well developed and well nourished, alert & oriented x 3, appears comfortable  Psychiatric: Normal  HEENT: Normocephalic, Atraumatic Trachea Midline, No torticollis  Pulmonary: No audible wheezing or respiratory distress   Abdomen/GI: Non tender, non distended   Cardiovascular: No pitting edema, 2+ radial pulse   Skin: No Masses, No Ulcerations  Neurovascular: Sensation Intact to the Median, Ulnar, Radial Nerve, Motor Intact to the Median, Ulnar, Radial Nerve, and Pulses Intact  Musculoskeletal: Normal, except as noted in detailed exam and in  HPI.      MUSCULOSKELETAL EXAMINATION:  Right long finger  No exposed bone  No erythema or signs of infection  No drainage  Area of skin loss at tip of middle finger  Subcutaneous tissues exposed  DIP flexion deformity, chronic    ___________________________________________________  STUDIES REVIEWED:  Cardiology note reviewed  CMP 12/22/23 reviewed      PROCEDURES PERFORMED:  Procedures  No Procedures performed today    _____________________________________________________      Scribe Attestation      I,:  Naye Reddy MA am acting as a scribe while in the presence of the attending physician.:       I,:  Sandeep Dietz MD personally performed the services described in this documentation    as scribed in my presence.:            Denies any family history of malignant hyperthermia/none

## 2024-10-21 ENCOUNTER — OFFICE VISIT (OUTPATIENT)
Dept: OBGYN CLINIC | Facility: CLINIC | Age: 79
End: 2024-10-21
Payer: COMMERCIAL

## 2024-10-21 VITALS — HEIGHT: 69 IN | BODY MASS INDEX: 28.14 KG/M2 | WEIGHT: 190 LBS

## 2024-10-21 DIAGNOSIS — L98.491 SKIN ULCER OF FINGER, LIMITED TO BREAKDOWN OF SKIN (HCC): Primary | ICD-10-CM

## 2024-10-21 PROCEDURE — 99213 OFFICE O/P EST LOW 20 MIN: CPT | Performed by: STUDENT IN AN ORGANIZED HEALTH CARE EDUCATION/TRAINING PROGRAM

## 2024-10-21 NOTE — PROGRESS NOTES
ORTHOPAEDIC HAND, WRIST, AND ELBOW OFFICE  VISIT      ASSESSMENT/PLAN:      Diagnoses and all orders for this visit:    Skin ulcer of finger, limited to breakdown of skin (HCC)          79 y.o. male with R long finger ulcer.  Treatment options and expected outcomes were discussed. We again discussed the risk of osteomyelitis developing and spread of infx. At his point, the finger is continuing to improve in appearance. We again discussed amputation vs continued monitoring and risks/benefits of both.  The patient verbalized understanding of exam findings and treatment plan.   The patient was given the opportunity to ask questions.  Questions were answered to the patient's satisfaction.  The patient decided to move forward with continued monitoring. He should continue to wash in the shower, followed by small amount of neosporin and bandages.   Continue to avoid trauma to the area and notify us if this occurs.   If any signs of infx develop, call right away.       Follow Up:  3 weeks       To Do Next Visit:  X-rays of the  right  long finger        Sandeep Dietz MD  Attending, Orthopaedic Surgery  Hand, Wrist, and Elbow Surgery  [facility]    ______________________________________________________________________________________________    CHIEF COMPLAINT:  Chief Complaint   Patient presents with    Right Middle Finger - Follow-up       SUBJECTIVE:  Patient is a 79 y.o. RHD male who presents today for follow up of R long finger ulcer and bone loss. Patient has opted to monitor this since no bone exposed on exam or signs of infx. He states he feels the finger is doing better. He feels it is closing up even more and is less swollen than previously. Denies drainage, f/c. Has been applying neosporin and bandages after showering.      Occupation: Retired     I have personally reviewed all the relevant PMH, PSH, SH, FH, Medications and allergies      PAST MEDICAL HISTORY:  Past Medical History:    Diagnosis Date    Aortic aneurysm (HCC)     Cardiac disease     Colon polyp     Coronary artery disease     Hypercholesteremia     Hypertension     Kidney stone     Myocardial infarction (HCC)     5/04/2003: 2/8/2013    Osteoporosis 04/01/2023    pt reports       PAST SURGICAL HISTORY:  Past Surgical History:   Procedure Laterality Date    CARDIAC SURGERY      patient reports having 4 stents placed    COLONOSCOPY  07/2015    Due 7/2020    COLONOSCOPY      HERNIA REPAIR      LITHOTRIPSY      x4    OTHER SURGICAL HISTORY      stents    TONSILLECTOMY      UPPER GASTROINTESTINAL ENDOSCOPY         FAMILY HISTORY:  Family History   Problem Relation Age of Onset    Heart disease Father     Diabetes Paternal Uncle        SOCIAL HISTORY:  Social History     Tobacco Use    Smoking status: Former     Types: Cigarettes    Smokeless tobacco: Never    Tobacco comments:     smoked in high school   Vaping Use    Vaping status: Never Used   Substance Use Topics    Alcohol use: Not Currently    Drug use: Never       MEDICATIONS:    Current Outpatient Medications:     acetaminophen-codeine (TYLENOL #3) 300-30 mg per tablet, , Disp: , Rfl:     alendronate (FOSAMAX) 70 mg tablet, Take 70 mg by mouth Once a week, Disp: , Rfl:     allopurinol (ZYLOPRIM) 300 mg tablet, Take 300 mg by mouth daily, Disp: , Rfl:     amoxicillin (AMOXIL) 500 MG tablet, TAKE FOUR TS PO 1 HOUR B DAPP (Patient not taking: Reported on 5/21/2024), Disp: , Rfl:     Aspirin Low Dose 81 MG EC tablet, Take 1 tablet (81 mg total) by mouth daily, Disp: 90 tablet, Rfl: 3    atorvastatin (LIPITOR) 40 mg tablet, Take 1 tablet (40 mg total) by mouth daily, Disp: 90 tablet, Rfl: 0    chlorhexidine (PERIDEX) 0.12 % solution, , Disp: , Rfl:     cholecalciferol (VITAMIN D3) 1,000 units tablet, Take 1,000 Units by mouth daily, Disp: , Rfl:     Diclofenac Sodium (VOLTAREN) 1 %, Apply 2 g topically 3 (three) times a day as needed (Pain) (Patient not taking: Reported on  2/22/2024), Disp: 150 g, Rfl: 3    Diclofenac Sodium (VOLTAREN) 1 %, Apply 2 g topically 3 (three) times a day as needed (Pain), Disp: 100 g, Rfl: 1    econazole nitrate 1 % cream, , Disp: , Rfl:     famotidine (PEPCID) 20 mg tablet, TAKE 1 TABLET BY MOUTH  TWICE DAILY, Disp: 180 tablet, Rfl: 3    lidocaine (Lidoderm) 5 %, Apply 1 patch topically over 12 hours daily Remove & Discard patch within 12 hours or as directed by MD, Disp: 30 patch, Rfl: 1    metoprolol succinate (TOPROL-XL) 25 mg 24 hr tablet, Take 0.5 tablets (12.5 mg total) by mouth daily, Disp: 45 tablet, Rfl: 3    naproxen (Naprosyn) 500 mg tablet, Take 1 tablet (500 mg total) by mouth 2 (two) times a day with meals for 7 days, Disp: 14 tablet, Rfl: 0    nitroglycerin (NITROSTAT) 0.4 mg SL tablet, Place 1 tablet under the tongue every 5 (five) minutes as needed, Disp: , Rfl:     pantoprazole (PROTONIX) 40 mg tablet, Take 1 tablet (40 mg total) by mouth daily, Disp: 30 tablet, Rfl: 0    ramipril (ALTACE) 5 mg capsule, TAKE 1 CAPSULE BY MOUTH TWICE  DAILY, Disp: 180 capsule, Rfl: 1    senna-docusate sodium (SENOKOT S) 8.6-50 mg per tablet, Take 1 tablet by mouth 2 (two) times a day, Disp: , Rfl:     ALLERGIES:  No Known Allergies        REVIEW OF SYSTEMS:  Musculoskeletal:        As noted in HPI.   All other systems reviewed and are negative.    VITALS:  There were no vitals filed for this visit.    LABS:  HgA1c:   Lab Results   Component Value Date    HGBA1C 5.5 12/04/2019     BMP:   Lab Results   Component Value Date    CALCIUM 9.1 12/22/2023    K 4.4 12/22/2023    CO2 25 12/22/2023     12/22/2023    BUN 16 12/22/2023    CREATININE 0.79 12/22/2023       _____________________________________________________  PHYSICAL EXAMINATION:  General: Well developed and well nourished, alert & oriented x 3, appears comfortable  Psychiatric: Normal  HEENT: Normocephalic, Atraumatic Trachea Midline, No torticollis  Pulmonary: No audible wheezing or respiratory  distress   Abdomen/GI: Non tender, non distended   Cardiovascular: No pitting edema, 2+ radial pulse   Skin: No Masses, No Erythema, No Lacerations, No Fluctuation  Neurovascular: Sensation Intact to the Median, Ulnar, Radial Nerve, Motor Intact to the Median, Ulnar, Radial Nerve, and Pulses Intact  Musculoskeletal: Normal, except as noted in detailed exam and in HPI.      MUSCULOSKELETAL EXAMINATION:  Right Long Finger:  Pt with progressive healing of wound at tip of finger.  There is still a small opening in the middle of the wound.  See photos.  No drainage, erythema or obvious fluctuance.   The nail appears overall healthy proximally.   + edema.   LROM 2/2 edema.   Brisk capillary refill.     ___________________________________________________  STUDIES REVIEWED:  Prior right middle finger x-rays reviewed  Lipid panel 5/25/23 reviewed          PROCEDURES PERFORMED:  Procedures  No Procedures performed today    _____________________________________________________      Scribe Attestation      I,:  Mere Terry PA-C am acting as a scribe while in the presence of the attending physician.:       I,:  Sandeep Dietz MD personally performed the services described in this documentation    as scribed in my presence.:

## 2024-11-15 ENCOUNTER — TELEPHONE (OUTPATIENT)
Age: 79
End: 2024-11-15

## 2024-11-15 ENCOUNTER — OFFICE VISIT (OUTPATIENT)
Dept: OBGYN CLINIC | Facility: CLINIC | Age: 79
End: 2024-11-15
Payer: COMMERCIAL

## 2024-11-15 ENCOUNTER — HOSPITAL ENCOUNTER (OUTPATIENT)
Dept: RADIOLOGY | Facility: HOSPITAL | Age: 79
End: 2024-11-15
Attending: STUDENT IN AN ORGANIZED HEALTH CARE EDUCATION/TRAINING PROGRAM
Payer: COMMERCIAL

## 2024-11-15 VITALS — WEIGHT: 190 LBS | HEIGHT: 69 IN | BODY MASS INDEX: 28.14 KG/M2

## 2024-11-15 DIAGNOSIS — L98.491 SKIN ULCER OF FINGER, LIMITED TO BREAKDOWN OF SKIN (HCC): ICD-10-CM

## 2024-11-15 DIAGNOSIS — L98.491 SKIN ULCER OF FINGER, LIMITED TO BREAKDOWN OF SKIN (HCC): Primary | ICD-10-CM

## 2024-11-15 DIAGNOSIS — M86.9 OSTEOMYELITIS OF RIGHT HAND, UNSPECIFIED TYPE (HCC): ICD-10-CM

## 2024-11-15 PROCEDURE — 99214 OFFICE O/P EST MOD 30 MIN: CPT | Performed by: STUDENT IN AN ORGANIZED HEALTH CARE EDUCATION/TRAINING PROGRAM

## 2024-11-15 PROCEDURE — 73140 X-RAY EXAM OF FINGER(S): CPT

## 2024-11-15 NOTE — PROGRESS NOTES
ORTHOPAEDIC HAND, WRIST, AND ELBOW OFFICE  VISIT      ASSESSMENT/PLAN:      Diagnoses and all orders for this visit:    Skin ulcer of finger, limited to breakdown of skin (HCC)  -     XR finger right third digit-middle; Future  -     Ambulatory Referral to Infectious Disease; Future  -     Cancel: MRI finger right wo and w contrast; Future  -     Cancel: MRI finger right wo and w contrast; Future  -     MRI finger right wo and w contrast; Future    Osteomyelitis of right hand, unspecified type (HCC)  -     Ambulatory Referral to Infectious Disease; Future  -     Cancel: MRI finger right wo and w contrast; Future  -     Cancel: MRI finger right wo and w contrast; Future  -     MRI finger right wo and w contrast; Future          79 y.o. male with right long finger ulcer that has mostly healed, now with suspected osteomyelitis of distal phalanx  Updated x-rays were performed in the office and reviewed demonstrating loss of distal bone from distal phalanx since previous x-ray in September, suspect osteomyelitis given radiographic changes.  Soft tissues have mostly healed. No obvious signs of infection with fluctuance or erythema.   After discussion with ID team, treatment options and expected outcomes were discussed with patient, which including doing observation, amputation of distal tip, I&D of the right long finger in OR and antibiotic course, and empiric management with ABX after MRI.   I discussed that with amputation would be that this would be a surgical cure for possible infection.  Discussed that I&D and culture would help treat the infection and help guide antibiotic management.  Discussed disadvantage to this would be placing patient at risk of wound healing complications given wound started without a traumatic event.  Discussed that if this wound did not heal, he would likely need amputation for final treatment.  Discussed empiric treatment with antibiotic only may not adequately treat infection he may need  "to get undergo surgical management at a later point.  The patient verbalized understanding of exam findings and treatment plan.   The patient was given the opportunity to ask questions.  Questions were answered to the patient's satisfaction.  The patient decided to take the weekend to think about his options and will call the office Monday for an update.  A long discussion was had with Kiko in person and over the phone after he left the office regarding treatment and plan of care       Sandeep Dietz MD  Attending, Orthopaedic Surgery  Hand, Wrist, and Elbow Surgery  [facility]    ______________________________________________________________________________________________    CHIEF COMPLAINT:  Chief Complaint   Patient presents with    Right Middle Finger - Follow-up       SUBJECTIVE:  Patient is a 79 y.o. RHD male who presents today for follow up of a right long finger ulcer. Overall Kiko is doing well. He notes the finger is \"ugly\" but does not bother him. The injury started after he tried to bite the tip of the finger as a hard jello-like mass occurs to the tip of the finger after his nail plate started to \"disintegrate.\" Wound started over 2.5 months ago. He has a history of a right ring finger partial tip amputation due to a [employer] work related injury.      Occupation: retired      I have personally reviewed all the relevant PMH, PSH, SH, FH, Medications and allergies      PAST MEDICAL HISTORY:  Past Medical History:   Diagnosis Date    Aortic aneurysm (HCC)     Cardiac disease     Colon polyp     Coronary artery disease     Hypercholesteremia     Hypertension     Kidney stone     Myocardial infarction (HCC)     5/04/2003: 2/8/2013    Osteoporosis 04/01/2023    pt reports       PAST SURGICAL HISTORY:  Past Surgical History:   Procedure Laterality Date    CARDIAC SURGERY      patient reports having 4 stents placed    COLONOSCOPY  07/2015    Due 7/2020    COLONOSCOPY      " HERNIA REPAIR      LITHOTRIPSY      x4    OTHER SURGICAL HISTORY      stents    TONSILLECTOMY      UPPER GASTROINTESTINAL ENDOSCOPY         FAMILY HISTORY:  Family History   Problem Relation Age of Onset    Heart disease Father     Diabetes Paternal Uncle        SOCIAL HISTORY:  Social History     Tobacco Use    Smoking status: Former     Types: Cigarettes    Smokeless tobacco: Never    Tobacco comments:     smoked in high school   Vaping Use    Vaping status: Never Used   Substance Use Topics    Alcohol use: Not Currently    Drug use: Never       MEDICATIONS:    Current Outpatient Medications:     allopurinol (ZYLOPRIM) 300 mg tablet, Take 300 mg by mouth daily, Disp: , Rfl:     Aspirin Low Dose 81 MG EC tablet, Take 1 tablet (81 mg total) by mouth daily, Disp: 90 tablet, Rfl: 3    atorvastatin (LIPITOR) 40 mg tablet, Take 1 tablet (40 mg total) by mouth daily, Disp: 90 tablet, Rfl: 0    cholecalciferol (VITAMIN D3) 1,000 units tablet, Take 1,000 Units by mouth daily, Disp: , Rfl:     Diclofenac Sodium (VOLTAREN) 1 %, Apply 2 g topically 3 (three) times a day as needed (Pain), Disp: 100 g, Rfl: 1    econazole nitrate 1 % cream, , Disp: , Rfl:     famotidine (PEPCID) 20 mg tablet, TAKE 1 TABLET BY MOUTH  TWICE DAILY, Disp: 180 tablet, Rfl: 3    lidocaine (Lidoderm) 5 %, Apply 1 patch topically over 12 hours daily Remove & Discard patch within 12 hours or as directed by MD, Disp: 30 patch, Rfl: 1    metoprolol succinate (TOPROL-XL) 25 mg 24 hr tablet, Take 0.5 tablets (12.5 mg total) by mouth daily, Disp: 45 tablet, Rfl: 3    nitroglycerin (NITROSTAT) 0.4 mg SL tablet, Place 1 tablet under the tongue every 5 (five) minutes as needed, Disp: , Rfl:     pantoprazole (PROTONIX) 40 mg tablet, Take 1 tablet (40 mg total) by mouth daily, Disp: 30 tablet, Rfl: 0    ramipril (ALTACE) 5 mg capsule, TAKE 1 CAPSULE BY MOUTH TWICE  DAILY, Disp: 180 capsule, Rfl: 1    acetaminophen-codeine (TYLENOL #3) 300-30 mg per tablet, ,  Disp: , Rfl:     alendronate (FOSAMAX) 70 mg tablet, Take 70 mg by mouth Once a week, Disp: , Rfl:     amoxicillin (AMOXIL) 500 MG tablet, TAKE FOUR TS PO 1 HOUR B DAPP (Patient not taking: Reported on 5/21/2024), Disp: , Rfl:     chlorhexidine (PERIDEX) 0.12 % solution, , Disp: , Rfl:     Diclofenac Sodium (VOLTAREN) 1 %, Apply 2 g topically 3 (three) times a day as needed (Pain) (Patient not taking: Reported on 2/22/2024), Disp: 150 g, Rfl: 3    naproxen (Naprosyn) 500 mg tablet, Take 1 tablet (500 mg total) by mouth 2 (two) times a day with meals for 7 days, Disp: 14 tablet, Rfl: 0    senna-docusate sodium (SENOKOT S) 8.6-50 mg per tablet, Take 1 tablet by mouth 2 (two) times a day, Disp: , Rfl:     ALLERGIES:  No Known Allergies        REVIEW OF SYSTEMS:  Musculoskeletal:        As noted in HPI.   All other systems reviewed and are negative.    VITALS:  There were no vitals filed for this visit.    LABS:  HgA1c:   Lab Results   Component Value Date    HGBA1C 5.5 12/04/2019     BMP:   Lab Results   Component Value Date    CALCIUM 9.1 12/22/2023    K 4.4 12/22/2023    CO2 25 12/22/2023     12/22/2023    BUN 16 12/22/2023    CREATININE 0.79 12/22/2023       _____________________________________________________  PHYSICAL EXAMINATION:  General: Well developed and well nourished, alert & oriented x 3, appears comfortable  Psychiatric: Normal  HEENT: Normocephalic, Atraumatic Trachea Midline, No torticollis  Pulmonary: No audible wheezing or respiratory distress   Abdomen/GI: Non tender, non distended   Cardiovascular: No pitting edema, 2+ radial pulse   Skin: No masses, erythema, lacerations, fluctation, ulcerations  Neurovascular: Sensation Intact to the Median, Ulnar, Radial Nerve, Motor Intact to the Median, Ulnar, Radial Nerve, and Pulses Intact  Musculoskeletal: Normal, except as noted in detailed exam and in HPI.      MUSCULOSKELETAL EXAMINATION:    Right long finger:   No drainage, no fluctuance  No  erythema or ecchymosis   Dry scab to the tip of the finger   No drainage   DIP flexion deformity chronic     ___________________________________________________  STUDIES REVIEWED:  Xrays of the right long finger were reviewed and independently interpreted in PACS by Dr. Dietz and demonstrate loss of bone at the distal aspect of distal phalanx when compared to pervious x-ray in September.          PROCEDURES PERFORMED:  Procedures  No Procedures performed today    _____________________________________________________      Scribe Attestation      I,:  Estela Reddy MA am acting as a scribe while in the presence of the attending physician.:       I,:  Sandeep Dietz MD personally performed the services described in this documentation    as scribed in my presence.:

## 2024-11-15 NOTE — TELEPHONE ENCOUNTER
ID Referral #[id number]     Osteomyelitis - R 3rd Digit   Provider Review per Alfredo    Hand/finger- Have office MD review chart, probably should be seen within 1 week. Send as high priority to ID clinical pool    Referred by: Surgery    Wait for instruction from MD on scheduling.

## 2024-11-15 NOTE — TELEPHONE ENCOUNTER
ID Referral  Referral States:  R Hand Osteomyelitis (3rd Digit)    Hand/finger- Have office MD review chart, probably should be seen within 1 week. Send as high priority to ID clinical pool    Referred by: Surgery    Wait for instruction from MD on scheduling.     11/15/24 Please schedule 2 weeks from from MRI completion, per BRIANNE Chicas. Schedule w/Dr. Douglas per VIANNEY Fontenot.    Scheduled appt. 12/09/24 Dr. Douglas    Pt/spouse not aware of referral to ID or need for MRI. Reviewed need for both. Spouse/pt to call Ortho Provider to review process moving forward. Transferred to Central Scheduling to schedule MRI. Spouse/patient had no further questions and/or concerns at this time.

## 2024-11-15 NOTE — H&P (VIEW-ONLY)
ORTHOPAEDIC HAND, WRIST, AND ELBOW OFFICE  VISIT      ASSESSMENT/PLAN:      Diagnoses and all orders for this visit:    Skin ulcer of finger, limited to breakdown of skin (HCC)  -     XR finger right third digit-middle; Future  -     Ambulatory Referral to Infectious Disease; Future  -     Cancel: MRI finger right wo and w contrast; Future  -     Cancel: MRI finger right wo and w contrast; Future  -     MRI finger right wo and w contrast; Future    Osteomyelitis of right hand, unspecified type (HCC)  -     Ambulatory Referral to Infectious Disease; Future  -     Cancel: MRI finger right wo and w contrast; Future  -     Cancel: MRI finger right wo and w contrast; Future  -     MRI finger right wo and w contrast; Future          79 y.o. male with right long finger ulcer that has mostly healed, now with suspected osteomyelitis of distal phalanx  Updated x-rays were performed in the office and reviewed demonstrating loss of distal bone from distal phalanx since previous x-ray in September, suspect osteomyelitis given radiographic changes.  Soft tissues have mostly healed. No obvious signs of infection with fluctuance or erythema.   After discussion with ID team, treatment options and expected outcomes were discussed with patient, which including doing observation, amputation of distal tip, I&D of the right long finger in OR and antibiotic course, and empiric management with ABX after MRI.   I discussed that with amputation would be that this would be a surgical cure for possible infection.  Discussed that I&D and culture would help treat the infection and help guide antibiotic management.  Discussed disadvantage to this would be placing patient at risk of wound healing complications given wound started without a traumatic event.  Discussed that if this wound did not heal, he would likely need amputation for final treatment.  Discussed empiric treatment with antibiotic only may not adequately treat infection he may need  "to get undergo surgical management at a later point.  The patient verbalized understanding of exam findings and treatment plan.   The patient was given the opportunity to ask questions.  Questions were answered to the patient's satisfaction.  The patient decided to take the weekend to think about his options and will call the office Monday for an update.  A long discussion was had with Kiko in person and over the phone after he left the office regarding treatment and plan of care       Sandeep Dietz MD  Attending, Orthopaedic Surgery  Hand, Wrist, and Elbow Surgery  [facility]    ______________________________________________________________________________________________    CHIEF COMPLAINT:  Chief Complaint   Patient presents with    Right Middle Finger - Follow-up       SUBJECTIVE:  Patient is a 79 y.o. RHD male who presents today for follow up of a right long finger ulcer. Overall Kiko is doing well. He notes the finger is \"ugly\" but does not bother him. The injury started after he tried to bite the tip of the finger as a hard jello-like mass occurs to the tip of the finger after his nail plate started to \"disintegrate.\" Wound started over 2.5 months ago. He has a history of a right ring finger partial tip amputation due to a [employer] work related injury.      Occupation: retired      I have personally reviewed all the relevant PMH, PSH, SH, FH, Medications and allergies      PAST MEDICAL HISTORY:  Past Medical History:   Diagnosis Date    Aortic aneurysm (HCC)     Cardiac disease     Colon polyp     Coronary artery disease     Hypercholesteremia     Hypertension     Kidney stone     Myocardial infarction (HCC)     5/04/2003: 2/8/2013    Osteoporosis 04/01/2023    pt reports       PAST SURGICAL HISTORY:  Past Surgical History:   Procedure Laterality Date    CARDIAC SURGERY      patient reports having 4 stents placed    COLONOSCOPY  07/2015    Due 7/2020    COLONOSCOPY      " HERNIA REPAIR      LITHOTRIPSY      x4    OTHER SURGICAL HISTORY      stents    TONSILLECTOMY      UPPER GASTROINTESTINAL ENDOSCOPY         FAMILY HISTORY:  Family History   Problem Relation Age of Onset    Heart disease Father     Diabetes Paternal Uncle        SOCIAL HISTORY:  Social History     Tobacco Use    Smoking status: Former     Types: Cigarettes    Smokeless tobacco: Never    Tobacco comments:     smoked in high school   Vaping Use    Vaping status: Never Used   Substance Use Topics    Alcohol use: Not Currently    Drug use: Never       MEDICATIONS:    Current Outpatient Medications:     allopurinol (ZYLOPRIM) 300 mg tablet, Take 300 mg by mouth daily, Disp: , Rfl:     Aspirin Low Dose 81 MG EC tablet, Take 1 tablet (81 mg total) by mouth daily, Disp: 90 tablet, Rfl: 3    atorvastatin (LIPITOR) 40 mg tablet, Take 1 tablet (40 mg total) by mouth daily, Disp: 90 tablet, Rfl: 0    cholecalciferol (VITAMIN D3) 1,000 units tablet, Take 1,000 Units by mouth daily, Disp: , Rfl:     Diclofenac Sodium (VOLTAREN) 1 %, Apply 2 g topically 3 (three) times a day as needed (Pain), Disp: 100 g, Rfl: 1    econazole nitrate 1 % cream, , Disp: , Rfl:     famotidine (PEPCID) 20 mg tablet, TAKE 1 TABLET BY MOUTH  TWICE DAILY, Disp: 180 tablet, Rfl: 3    lidocaine (Lidoderm) 5 %, Apply 1 patch topically over 12 hours daily Remove & Discard patch within 12 hours or as directed by MD, Disp: 30 patch, Rfl: 1    metoprolol succinate (TOPROL-XL) 25 mg 24 hr tablet, Take 0.5 tablets (12.5 mg total) by mouth daily, Disp: 45 tablet, Rfl: 3    nitroglycerin (NITROSTAT) 0.4 mg SL tablet, Place 1 tablet under the tongue every 5 (five) minutes as needed, Disp: , Rfl:     pantoprazole (PROTONIX) 40 mg tablet, Take 1 tablet (40 mg total) by mouth daily, Disp: 30 tablet, Rfl: 0    ramipril (ALTACE) 5 mg capsule, TAKE 1 CAPSULE BY MOUTH TWICE  DAILY, Disp: 180 capsule, Rfl: 1    acetaminophen-codeine (TYLENOL #3) 300-30 mg per tablet, ,  Disp: , Rfl:     alendronate (FOSAMAX) 70 mg tablet, Take 70 mg by mouth Once a week, Disp: , Rfl:     amoxicillin (AMOXIL) 500 MG tablet, TAKE FOUR TS PO 1 HOUR B DAPP (Patient not taking: Reported on 5/21/2024), Disp: , Rfl:     chlorhexidine (PERIDEX) 0.12 % solution, , Disp: , Rfl:     Diclofenac Sodium (VOLTAREN) 1 %, Apply 2 g topically 3 (three) times a day as needed (Pain) (Patient not taking: Reported on 2/22/2024), Disp: 150 g, Rfl: 3    naproxen (Naprosyn) 500 mg tablet, Take 1 tablet (500 mg total) by mouth 2 (two) times a day with meals for 7 days, Disp: 14 tablet, Rfl: 0    senna-docusate sodium (SENOKOT S) 8.6-50 mg per tablet, Take 1 tablet by mouth 2 (two) times a day, Disp: , Rfl:     ALLERGIES:  No Known Allergies        REVIEW OF SYSTEMS:  Musculoskeletal:        As noted in HPI.   All other systems reviewed and are negative.    VITALS:  There were no vitals filed for this visit.    LABS:  HgA1c:   Lab Results   Component Value Date    HGBA1C 5.5 12/04/2019     BMP:   Lab Results   Component Value Date    CALCIUM 9.1 12/22/2023    K 4.4 12/22/2023    CO2 25 12/22/2023     12/22/2023    BUN 16 12/22/2023    CREATININE 0.79 12/22/2023       _____________________________________________________  PHYSICAL EXAMINATION:  General: Well developed and well nourished, alert & oriented x 3, appears comfortable  Psychiatric: Normal  HEENT: Normocephalic, Atraumatic Trachea Midline, No torticollis  Pulmonary: No audible wheezing or respiratory distress   Abdomen/GI: Non tender, non distended   Cardiovascular: No pitting edema, 2+ radial pulse   Skin: No masses, erythema, lacerations, fluctation, ulcerations  Neurovascular: Sensation Intact to the Median, Ulnar, Radial Nerve, Motor Intact to the Median, Ulnar, Radial Nerve, and Pulses Intact  Musculoskeletal: Normal, except as noted in detailed exam and in HPI.      MUSCULOSKELETAL EXAMINATION:    Right long finger:   No drainage, no fluctuance  No  erythema or ecchymosis   Dry scab to the tip of the finger   No drainage   DIP flexion deformity chronic     ___________________________________________________  STUDIES REVIEWED:  Xrays of the right long finger were reviewed and independently interpreted in PACS by Dr. Dietz and demonstrate loss of bone at the distal aspect of distal phalanx when compared to pervious x-ray in September.          PROCEDURES PERFORMED:  Procedures  No Procedures performed today    _____________________________________________________      Scribe Attestation      I,:  Estela Reddy MA am acting as a scribe while in the presence of the attending physician.:       I,:  Sandeep Dietz MD personally performed the services described in this documentation    as scribed in my presence.:

## 2024-11-16 ENCOUNTER — HOSPITAL ENCOUNTER (OUTPATIENT)
Dept: MRI IMAGING | Facility: HOSPITAL | Age: 79
Discharge: HOME | End: 2024-11-16
Attending: STUDENT IN AN ORGANIZED HEALTH CARE EDUCATION/TRAINING PROGRAM
Payer: COMMERCIAL

## 2024-11-16 DIAGNOSIS — M86.9 OSTEOMYELITIS OF RIGHT HAND, UNSPECIFIED TYPE (HCC): ICD-10-CM

## 2024-11-16 DIAGNOSIS — L98.491 SKIN ULCER OF FINGER, LIMITED TO BREAKDOWN OF SKIN (HCC): ICD-10-CM

## 2024-11-16 PROCEDURE — 73220 MRI UPPR EXTREMITY W/O&W/DYE: CPT

## 2024-11-16 PROCEDURE — A9585 GADOBUTROL INJECTION: HCPCS | Performed by: STUDENT IN AN ORGANIZED HEALTH CARE EDUCATION/TRAINING PROGRAM

## 2024-11-16 RX ORDER — GADOBUTROL 604.72 MG/ML
8.6 INJECTION INTRAVENOUS
Status: COMPLETED | OUTPATIENT
Start: 2024-11-16 | End: 2024-11-16

## 2024-11-16 RX ADMIN — GADOBUTROL 8.6 ML: 604.72 INJECTION INTRAVENOUS at 10:18

## 2024-11-18 ENCOUNTER — TELEPHONE (OUTPATIENT)
Age: 79
End: 2024-11-18

## 2024-11-18 NOTE — TELEPHONE ENCOUNTER
Caller: Patients wife Ysabel    Doctor: J Luis    Reason for call: Patients wife is calling requesting to speak with Dr Dietz. She stated they have made a decision and wany to discuss with the doctor directly.    Call back#: [phone]

## 2024-11-18 NOTE — TELEPHONE ENCOUNTER
Called and spoke with patient and wife as well as patient's physician brother in law. Discussed MRI findings of osteomyelitis of distal phalanx. Discussed at length regarding options including empiric treatment with antibiotics, I&D with antibiotics, or amputation. Discussed amputation would be surgical cure for osteomyelitis. After much deliberation, patient has elected for amputation of distal phalanx. Offered surgery this week, but patient stated this is too soon and he needs more time to process. He would like this taken care of next week.   Him/He

## 2024-11-18 NOTE — TELEPHONE ENCOUNTER
Pts wife calling regarding MRI results    / states pt would like a sooner appt in [facility] if any become available.    Pts wife states they are elderly and [facility] is very close to home for them.

## 2024-11-18 NOTE — TELEPHONE ENCOUNTER
Called and spoke with patients wife Ysabel today.   Informed Ysabel at this time there are no sooner available appointments at the [place] office. Ysabel states patient had MRI done on 11/16/24. Patient currently scheduled 12/9/24 at 10AM with Dr. Douglas at the [place] office. Ysabel is looking to see if the patient can be seen sooner as his MRI has been completed.

## 2024-11-19 ENCOUNTER — TELEPHONE (OUTPATIENT)
Age: 79
End: 2024-11-19

## 2024-11-19 NOTE — TELEPHONE ENCOUNTER
Contacted patient back to inform that as long as he goes through with the amputation, he will not need to see us. Patient thanks us for our call.

## 2024-11-19 NOTE — TELEPHONE ENCOUNTER
Caller: Patient     Doctor: J Luis    Reason for call: Patient is calling to confirm his date for surgery. He stated the surgery should be scheduled for next next, but he doesn't have a date. He stated someone from the office left him a message this morning, but he could not understand what the person was saying.     Call back#: [phone] please call Ysabel  (spouse)

## 2024-11-19 NOTE — TELEPHONE ENCOUNTER
Caller: Rosalie from Orthopedics    Doctor: Dominga    Reason for call: Rosalie from Orthopedics called requesting a cc for right long finger amputation next week. She is scanning request into pt's chart because she has trouble with things going through fax. Pt was seen 9/6. She's requesting it be scanned into the patient's chart.      Any questions, call Rosalie at [phone]   PSychopharm with supportive therapy and MI for substance use with addiction tx plan with optiomal PD mgmt and appropriate aftercare PSychopharm with supportive therapy and MI for substance use with addiction tx plan with optiomal PD mgmt and appropriate aftercare PSychopharm with supportive therapy and MI for substance use with addiction tx plan with optiomal PD mgmt and appropriate aftercare PSychopharm with supportive therapy and MI for substance use with addiction tx plan with optiomal PD mgmt and appropriate aftercare PSychopharm with supportive therapy and MI for substance use with addiction tx plan with optiomal PD mgmt and appropriate aftercare PSychopharm with supportive therapy and MI for substance use with addiction tx plan with optiomal PD mgmt and appropriate aftercare PSychopharm with supportive therapy and MI for substance use with addiction tx plan with optiomal PD mgmt and appropriate aftercare PSychopharm with supportive therapy and MI for substance use with addiction tx plan with optiomal PD mgmt and appropriate aftercare

## 2024-11-19 NOTE — TELEPHONE ENCOUNTER
Patients wife, Ysabel, called and asked if it is necessary to keep appointment on 12/9/24 with Dr Douglas since amputation is scheduled 12/10/24. Ysabel requested a call back on home number to discuss. Please advise.

## 2024-11-19 NOTE — TELEPHONE ENCOUNTER
Returned call to pt's spouse per IB rec'd.  Provided details for pt's surgery date and location.  Reviewed pre-op instructions and scheduled a p/o appt.  A form has been scanned to the pt's chart for cardiac clearance after contacting them to have it signed by Dr. Orr.  She verbalized understanding.

## 2024-11-20 ENCOUNTER — ANESTHESIA EVENT (OUTPATIENT)
Dept: ANESTHESIOLOGY | Facility: HOSPITAL | Age: 79
End: 2024-11-20

## 2024-11-20 ENCOUNTER — APPOINTMENT (OUTPATIENT)
Dept: LAB | Facility: CLINIC | Age: 79
End: 2024-11-20
Payer: COMMERCIAL

## 2024-11-20 ENCOUNTER — ANESTHESIA (OUTPATIENT)
Dept: ANESTHESIOLOGY | Facility: HOSPITAL | Age: 79
End: 2024-11-20

## 2024-11-20 DIAGNOSIS — Z01.818 PREOP TESTING: ICD-10-CM

## 2024-11-20 LAB
ANION GAP SERPL CALCULATED.3IONS-SCNC: 4 MMOL/L (ref 4–13)
BASOPHILS # BLD AUTO: 0.06 THOUSANDS/ÂΜL (ref 0–0.1)
BASOPHILS NFR BLD AUTO: 1 % (ref 0–1)
BUN SERPL-MCNC: 13 MG/DL (ref 5–25)
CALCIUM SERPL-MCNC: 8.8 MG/DL (ref 8.4–10.2)
CHLORIDE SERPL-SCNC: 108 MMOL/L (ref 96–108)
CO2 SERPL-SCNC: 29 MMOL/L (ref 21–32)
CREAT SERPL-MCNC: 0.86 MG/DL (ref 0.6–1.3)
EOSINOPHIL # BLD AUTO: 0.04 THOUSAND/ÂΜL (ref 0–0.61)
EOSINOPHIL NFR BLD AUTO: 1 % (ref 0–6)
ERYTHROCYTE [DISTWIDTH] IN BLOOD BY AUTOMATED COUNT: 17.4 % (ref 11.6–15.1)
GFR SERPL CREATININE-BSD FRML MDRD: 83 ML/MIN/1.73SQ M
GLUCOSE SERPL-MCNC: 115 MG/DL (ref 65–140)
HCT VFR BLD AUTO: 34.2 % (ref 36.5–49.3)
HGB BLD-MCNC: 10.5 G/DL (ref 12–17)
IMM GRANULOCYTES # BLD AUTO: 0.01 THOUSAND/UL (ref 0–0.2)
IMM GRANULOCYTES NFR BLD AUTO: 0 % (ref 0–2)
LYMPHOCYTES # BLD AUTO: 1.4 THOUSANDS/ÂΜL (ref 0.6–4.47)
LYMPHOCYTES NFR BLD AUTO: 21 % (ref 14–44)
MCH RBC QN AUTO: 27.2 PG (ref 26.8–34.3)
MCHC RBC AUTO-ENTMCNC: 30.7 G/DL (ref 31.4–37.4)
MCV RBC AUTO: 89 FL (ref 82–98)
MONOCYTES # BLD AUTO: 0.72 THOUSAND/ÂΜL (ref 0.17–1.22)
MONOCYTES NFR BLD AUTO: 11 % (ref 4–12)
NEUTROPHILS # BLD AUTO: 4.52 THOUSANDS/ÂΜL (ref 1.85–7.62)
NEUTS SEG NFR BLD AUTO: 66 % (ref 43–75)
NRBC BLD AUTO-RTO: 0 /100 WBCS
PLATELET # BLD AUTO: 340 THOUSANDS/UL (ref 149–390)
PMV BLD AUTO: 10 FL (ref 8.9–12.7)
POTASSIUM SERPL-SCNC: 4.2 MMOL/L (ref 3.5–5.3)
RBC # BLD AUTO: 3.86 MILLION/UL (ref 3.88–5.62)
SODIUM SERPL-SCNC: 141 MMOL/L (ref 135–147)
WBC # BLD AUTO: 6.75 THOUSAND/UL (ref 4.31–10.16)

## 2024-11-20 PROCEDURE — 80048 BASIC METABOLIC PNL TOTAL CA: CPT

## 2024-11-20 PROCEDURE — 85025 COMPLETE CBC W/AUTO DIFF WBC: CPT

## 2024-11-20 PROCEDURE — 36415 COLL VENOUS BLD VENIPUNCTURE: CPT

## 2024-11-21 LAB
ATRIAL RATE: 60 BPM
P AXIS: 21 DEGREES
PR INTERVAL: 184 MS
QRS AXIS: -62 DEGREES
QRSD INTERVAL: 138 MS
QT INTERVAL: 442 MS
QTC INTERVAL: 442 MS
T WAVE AXIS: 49 DEGREES
VENTRICULAR RATE: 60 BPM

## 2024-11-21 PROCEDURE — 93010 ELECTROCARDIOGRAM REPORT: CPT | Performed by: INTERNAL MEDICINE

## 2024-11-21 NOTE — PRE-PROCEDURE INSTRUCTIONS
Pre-Surgery Instructions:   Medication Instructions    allopurinol (ZYLOPRIM) 300 mg tablet Take Day of Surgery; unless usually taken at night     Aspirin Low Dose 81 MG EC tablet Take Day of Surgery; unless usually taken at night -usually taken at night    atorvastatin (LIPITOR) 40 mg tablet Take Day of Surgery; unless usually taken at night - takes HS    cholecalciferol (VITAMIN D3) 1,000 units tablet Avoid 1 week prior to surgery      famotidine (PEPCID) 20 mg tablet Take Day of Surgery; unless usually taken at night     metoprolol succinate (TOPROL-XL) 25 mg 24 hr tablet Take Day of Surgery; unless usually taken at night - usually taken at night    ramipril (ALTACE) 5 mg capsule Do not take day of surgery; continue as prescribed excluding DOS     senna-docusate sodium (SENOKOT S) 8.6-50 mg per tablet Take Day of Surgery; unless usually taken at night     Medication instructions for day surgery reviewed. Please use only a sip of water to take your instructed medications. Avoid all over the counter vitamins, supplements and NSAIDS for one week prior to surgery per anesthesia guidelines. Tylenol is ok to take as needed.     You will receive a call one business day prior to surgery with an arrival time and hospital directions. If your surgery is scheduled on a Monday, the hospital will be calling you on the Friday prior to your surgery. If you have not heard from anyone by 8pm, please call the hospital supervisor through the hospital  at [phone]. ([place] [phone] or [place] [phone]).    Do not eat or drink anything after midnight the night before your surgery, including candy, mints, lifesavers, or chewing gum. Do not drink alcohol 24hrs before your surgery. Try not to smoke at least 24hrs before your surgery.       Follow the pre surgery showering instructions as listed in the “My Surgical Experience Booklet” or otherwise provided by your surgeon's office. Do not use a blade to shave the  surgical area 1 week before surgery. It is okay to use a clean electric clippers up to 24 hours before surgery. Do not apply any lotions, creams, including makeup, cologne, deodorant, or perfumes after showering on the day of your surgery. Do not use dry shampoo, hair spray, hair gel, or any type of hair products.     No contact lenses, eye make-up, or artificial eyelashes. Remove nail polish, including gel polish, and any artificial, gel, or acrylic nails if possible. Remove all jewelry including rings and body piercing jewelry.     Wear causal clothing that is easy to take on and off. Consider your type of surgery.    Keep any valuables, jewelry, piercings at home. Please bring any specially ordered equipment (sling, braces) if indicated.    Arrange for a responsible person to drive you to and from the hospital on the day of your surgery. Please confirm the visitor policy for the day of your procedure when you receive your phone call with an arrival time.     Call the surgeon's office with any new illnesses, exposures, or additional questions prior to surgery.    Please reference your “My Surgical Experience Booklet” for additional information to prepare for your upcoming surgery.

## 2024-11-25 ENCOUNTER — OFFICE VISIT (OUTPATIENT)
Dept: PODIATRY | Facility: CLINIC | Age: 79
End: 2024-11-25
Payer: COMMERCIAL

## 2024-11-25 ENCOUNTER — TELEPHONE (OUTPATIENT)
Age: 79
End: 2024-11-25

## 2024-11-25 VITALS
SYSTOLIC BLOOD PRESSURE: 139 MMHG | WEIGHT: 197 LBS | DIASTOLIC BLOOD PRESSURE: 88 MMHG | HEIGHT: 69 IN | HEART RATE: 53 BPM | OXYGEN SATURATION: 98 % | BODY MASS INDEX: 29.18 KG/M2

## 2024-11-25 DIAGNOSIS — B35.1 ONYCHOMYCOSIS: Primary | ICD-10-CM

## 2024-11-25 DIAGNOSIS — I73.9 PVD (PERIPHERAL VASCULAR DISEASE) (HCC): ICD-10-CM

## 2024-11-25 DIAGNOSIS — B35.3 TINEA PEDIS OF BOTH FEET: ICD-10-CM

## 2024-11-25 PROCEDURE — RECHECK: Performed by: PODIATRIST

## 2024-11-25 PROCEDURE — 11721 DEBRIDE NAIL 6 OR MORE: CPT | Performed by: PODIATRIST

## 2024-11-25 RX ORDER — ECONAZOLE NITRATE 10 MG/G
CREAM TOPICAL DAILY
Qty: 85 G | Refills: 2 | Status: SHIPPED | OUTPATIENT
Start: 2024-11-25

## 2024-11-25 RX ORDER — ECONAZOLE NITRATE 10 MG/G
CREAM TOPICAL DAILY
Qty: 85 G | Refills: 2 | Status: SHIPPED | OUTPATIENT
Start: 2024-11-25 | End: 2024-11-25 | Stop reason: SDUPTHER

## 2024-11-25 NOTE — TELEPHONE ENCOUNTER
Caller: Kiko     Doctor and/or Office: Dr Pate     CB#: [phone]    Escalation: Medication Patient called in and stated his prescription was sent to   [facility] He stated he wanted it sent [facility]  Thank you

## 2024-11-25 NOTE — PROGRESS NOTES
Assessment/Plan:     The patient's clinical examination today is significant for brittle, thickened and dystrophic pedal nail plates with discoloration and subungual debris consistent with onychomycosis x10.  There are no open lesions.  There are callused lesions noted to the medial aspect of the hallux bilaterally, left worse than the right.  The interdigital spaces are clear without maceration.  The pedal skin with decreased turgor.  There is an absence of hair growth.  DP pulses are palpable but diminished bilaterally, PT  pulses were non-palpable today.     The pedal nail plates are sharply debrided with a sterile nail clipper x10 without complication.  The nails were then reduced in thickness and girth utilizing a rotary bur.  There are no other acute pedal issues.       Recommend follow-up in 4 months or as needed.        Diagnoses and all orders for this visit:    Onychomycosis  -     econazole nitrate 1 % cream; Apply topically daily    Tinea pedis of both feet  -     econazole nitrate 1 % cream; Apply topically daily          Subjective:     Patient ID: Kiko Cruz is a 79 y.o. male.    The patient presents today for follow-up of painful elongated pedal nail plates.  He has no other acute pedal issues today. He states that he will be having hand surgery soon for an infected finger.           PAST MEDICAL HISTORY:  Past Medical History:   Diagnosis Date    Aortic aneurysm (HCC)     Cardiac disease     Colon polyp     Coronary artery disease     Gout     Hypercholesteremia     Hypertension     Kidney stone     Myocardial infarction (HCC)     5/04/2003: 2/8/2013    Osteoporosis 04/01/2023    pt reports       PAST SURGICAL HISTORY:  Past Surgical History:   Procedure Laterality Date    CARDIAC SURGERY      patient reports having 4 stents placed    COLONOSCOPY  07/2015    Due 7/2020    COLONOSCOPY      HERNIA REPAIR      LITHOTRIPSY      x4    OTHER SURGICAL HISTORY      stents    TONSILLECTOMY      TOTAL  HIP ARTHROPLASTY Right     UPPER GASTROINTESTINAL ENDOSCOPY          ALLERGIES:  Patient has no known allergies.    MEDICATIONS:  Current Outpatient Medications   Medication Sig Dispense Refill    allopurinol (ZYLOPRIM) 300 mg tablet Take 300 mg by mouth daily      Aspirin Low Dose 81 MG EC tablet Take 1 tablet (81 mg total) by mouth daily 90 tablet 3    atorvastatin (LIPITOR) 40 mg tablet Take 1 tablet (40 mg total) by mouth daily (Patient taking differently: Take 40 mg by mouth daily at bedtime) 90 tablet 0    cholecalciferol (VITAMIN D3) 1,000 units tablet Take 1,000 Units by mouth daily      econazole nitrate 1 % cream Apply topically daily 85 g 2    famotidine (PEPCID) 20 mg tablet TAKE 1 TABLET BY MOUTH  TWICE DAILY 180 tablet 3    metoprolol succinate (TOPROL-XL) 25 mg 24 hr tablet Take 0.5 tablets (12.5 mg total) by mouth daily (Patient taking differently: Take 12.5 mg by mouth daily at bedtime) 45 tablet 3    nitroglycerin (NITROSTAT) 0.4 mg SL tablet Place 1 tablet under the tongue every 5 (five) minutes as needed      ramipril (ALTACE) 5 mg capsule TAKE 1 CAPSULE BY MOUTH TWICE  DAILY 180 capsule 1    acetaminophen-codeine (TYLENOL #3) 300-30 mg per tablet  (Patient not taking: Reported on 9/6/2024)      alendronate (FOSAMAX) 70 mg tablet Take 70 mg by mouth Once a week      amoxicillin (AMOXIL) 500 MG tablet TAKE FOUR TS PO 1 HOUR B DAPP (Patient not taking: Reported on 5/21/2024)      chlorhexidine (PERIDEX) 0.12 % solution  (Patient not taking: Reported on 2/22/2024)      Diclofenac Sodium (VOLTAREN) 1 % Apply 2 g topically 3 (three) times a day as needed (Pain) (Patient not taking: Reported on 2/22/2024) 150 g 3    Diclofenac Sodium (VOLTAREN) 1 % Apply 2 g topically 3 (three) times a day as needed (Pain) (Patient not taking: Reported on 11/25/2024) 100 g 1    lidocaine (Lidoderm) 5 % Apply 1 patch topically over 12 hours daily Remove & Discard patch within 12 hours or as directed by MD (Patient not  taking: Reported on 11/25/2024) 30 patch 1    senna-docusate sodium (SENOKOT S) 8.6-50 mg per tablet Take 1 tablet by mouth 2 (two) times a day       No current facility-administered medications for this visit.       SOCIAL HISTORY:  Social History     Socioeconomic History    Marital status: /Civil Union     Spouse name: None    Number of children: None    Years of education: None    Highest education level: None   Occupational History    None   Tobacco Use    Smoking status: Former     Types: Cigarettes    Smokeless tobacco: Never    Tobacco comments:     smoked in high school   Vaping Use    Vaping status: Never Used   Substance and Sexual Activity    Alcohol use: Not Currently    Drug use: Never    Sexual activity: Not Currently     Partners: Female   Other Topics Concern    None   Social History Narrative    None     Social Drivers of Health     Financial Resource Strain: Not on file   Food Insecurity: Not on file   Transportation Needs: Not on file   Physical Activity: Not on file   Stress: Not on file   Social Connections: Not on file   Intimate Partner Violence: Not on file   Housing Stability: Not on file        Review of Systems   Constitutional: Negative.    HENT: Negative.     Eyes: Negative.    Respiratory: Negative.     Cardiovascular: Negative.    Endocrine: Negative.    Musculoskeletal: Negative.    Neurological: Negative.    Hematological: Negative.    Psychiatric/Behavioral: Negative.           Objective:     Physical Exam  Vitals reviewed.   Constitutional:       Appearance: Normal appearance.   HENT:      Head: Normocephalic and atraumatic.      Nose: Nose normal.   Eyes:      Conjunctiva/sclera: Conjunctivae normal.      Pupils: Pupils are equal, round, and reactive to light.   Cardiovascular:      Pulses:           Dorsalis pedis pulses are 1+ on the right side and 1+ on the left side.        Posterior tibial pulses are 0 on the right side and 0 on the left side.   Pulmonary:       Effort: Pulmonary effort is normal.   Feet:      Right foot:      Toenail Condition: Right toenails are abnormally thick and long. Fungal disease present.     Left foot:      Toenail Condition: Left toenails are abnormally thick and long. Fungal disease present.     Comments: The patient's clinical examination today is significant for thickened and dystrophic pedal nail plates with discoloration and subungual debris consistent with onychomycosis x10.  There are no open lesions.  There are callused lesions noted to the medial aspect of the hallux bilaterally, left worse than the right.  The interdigital spaces are clear without maceration.  The pedal skin with decreased turgor.  There is an absence of hair growth.  DP pulses are palpable but diminished bilaterally, PT  pulses were non-palpable today.     Skin:     General: Skin is warm.      Capillary Refill: Capillary refill takes 2 to 3 seconds.   Neurological:      General: No focal deficit present.      Mental Status: He is alert and oriented to person, place, and time.   Psychiatric:         Mood and Affect: Mood normal.         Behavior: Behavior normal.         Thought Content: Thought content normal.

## 2024-11-27 ENCOUNTER — ANESTHESIA EVENT (OUTPATIENT)
Dept: PERIOP | Facility: AMBULARY SURGERY CENTER | Age: 79
End: 2024-11-27
Payer: COMMERCIAL

## 2024-11-27 ENCOUNTER — HOSPITAL ENCOUNTER (OUTPATIENT)
Facility: AMBULARY SURGERY CENTER | Age: 79
Setting detail: OUTPATIENT SURGERY
Discharge: HOME | End: 2024-11-27
Attending: STUDENT IN AN ORGANIZED HEALTH CARE EDUCATION/TRAINING PROGRAM | Admitting: STUDENT IN AN ORGANIZED HEALTH CARE EDUCATION/TRAINING PROGRAM
Payer: COMMERCIAL

## 2024-11-27 ENCOUNTER — ANESTHESIA (OUTPATIENT)
Dept: PERIOP | Facility: AMBULARY SURGERY CENTER | Age: 79
End: 2024-11-27
Payer: COMMERCIAL

## 2024-11-27 VITALS
WEIGHT: 195 LBS | HEIGHT: 68 IN | DIASTOLIC BLOOD PRESSURE: 85 MMHG | HEART RATE: 59 BPM | BODY MASS INDEX: 29.55 KG/M2 | OXYGEN SATURATION: 99 % | SYSTOLIC BLOOD PRESSURE: 172 MMHG | TEMPERATURE: 97 F | RESPIRATION RATE: 16 BRPM

## 2024-11-27 DIAGNOSIS — M86.9 OSTEOMYELITIS OF RIGHT HAND, UNSPECIFIED TYPE (HCC): ICD-10-CM

## 2024-11-27 PROCEDURE — 87176 TISSUE HOMOGENIZATION CULTR: CPT | Performed by: STUDENT IN AN ORGANIZED HEALTH CARE EDUCATION/TRAINING PROGRAM

## 2024-11-27 PROCEDURE — 88305 TISSUE EXAM BY PATHOLOGIST: CPT | Performed by: PATHOLOGY

## 2024-11-27 PROCEDURE — 87205 SMEAR GRAM STAIN: CPT | Performed by: STUDENT IN AN ORGANIZED HEALTH CARE EDUCATION/TRAINING PROGRAM

## 2024-11-27 PROCEDURE — 88311 DECALCIFY TISSUE: CPT | Performed by: PATHOLOGY

## 2024-11-27 PROCEDURE — 26989 UNLISTED PX HANDS/FINGERS: CPT | Performed by: STUDENT IN AN ORGANIZED HEALTH CARE EDUCATION/TRAINING PROGRAM

## 2024-11-27 PROCEDURE — NC001 PR NO CHARGE: Performed by: PHYSICIAN ASSISTANT

## 2024-11-27 PROCEDURE — 87075 CULTR BACTERIA EXCEPT BLOOD: CPT | Performed by: STUDENT IN AN ORGANIZED HEALTH CARE EDUCATION/TRAINING PROGRAM

## 2024-11-27 PROCEDURE — 87070 CULTURE OTHR SPECIMN AEROBIC: CPT | Performed by: STUDENT IN AN ORGANIZED HEALTH CARE EDUCATION/TRAINING PROGRAM

## 2024-11-27 RX ORDER — FENTANYL CITRATE 50 UG/ML
INJECTION, SOLUTION INTRAMUSCULAR; INTRAVENOUS AS NEEDED
Status: DISCONTINUED | OUTPATIENT
Start: 2024-11-27 | End: 2024-11-27

## 2024-11-27 RX ORDER — PROPOFOL 10 MG/ML
INJECTION, EMULSION INTRAVENOUS AS NEEDED
Status: DISCONTINUED | OUTPATIENT
Start: 2024-11-27 | End: 2024-11-27

## 2024-11-27 RX ORDER — OXYCODONE HYDROCHLORIDE 5 MG/1
5 TABLET ORAL EVERY 6 HOURS PRN
Status: DISCONTINUED | OUTPATIENT
Start: 2024-11-27 | End: 2024-11-27 | Stop reason: HOSPADM

## 2024-11-27 RX ORDER — ALBUTEROL SULFATE 0.83 MG/ML
2.5 SOLUTION RESPIRATORY (INHALATION) ONCE AS NEEDED
Status: DISCONTINUED | OUTPATIENT
Start: 2024-11-27 | End: 2024-11-27 | Stop reason: HOSPADM

## 2024-11-27 RX ORDER — LIDOCAINE HYDROCHLORIDE 20 MG/ML
INJECTION, SOLUTION EPIDURAL; INFILTRATION; INTRACAUDAL; PERINEURAL AS NEEDED
Status: DISCONTINUED | OUTPATIENT
Start: 2024-11-27 | End: 2024-11-27

## 2024-11-27 RX ORDER — CEFAZOLIN SODIUM 2 G/50ML
2000 SOLUTION INTRAVENOUS ONCE
Status: COMPLETED | OUTPATIENT
Start: 2024-11-27 | End: 2024-11-27

## 2024-11-27 RX ORDER — MAGNESIUM HYDROXIDE 1200 MG/15ML
LIQUID ORAL AS NEEDED
Status: DISCONTINUED | OUTPATIENT
Start: 2024-11-27 | End: 2024-11-27 | Stop reason: HOSPADM

## 2024-11-27 RX ORDER — SULFAMETHOXAZOLE AND TRIMETHOPRIM 800; 160 MG/1; MG/1
1 TABLET ORAL EVERY 12 HOURS SCHEDULED
Qty: 14 TABLET | Refills: 0 | Status: SHIPPED | OUTPATIENT
Start: 2024-11-27 | End: 2024-12-04

## 2024-11-27 RX ORDER — OXYCODONE HYDROCHLORIDE 5 MG/1
5 TABLET ORAL EVERY 6 HOURS PRN
Qty: 15 TABLET | Refills: 0 | Status: SHIPPED | OUTPATIENT
Start: 2024-11-27

## 2024-11-27 RX ORDER — MEPERIDINE HYDROCHLORIDE 25 MG/ML
12.5 INJECTION INTRAMUSCULAR; INTRAVENOUS; SUBCUTANEOUS
Status: DISCONTINUED | OUTPATIENT
Start: 2024-11-27 | End: 2024-11-27 | Stop reason: HOSPADM

## 2024-11-27 RX ORDER — ONDANSETRON 2 MG/ML
4 INJECTION INTRAMUSCULAR; INTRAVENOUS ONCE AS NEEDED
Status: DISCONTINUED | OUTPATIENT
Start: 2024-11-27 | End: 2024-11-27 | Stop reason: HOSPADM

## 2024-11-27 RX ORDER — PROMETHAZINE HYDROCHLORIDE 25 MG/ML
12.5 INJECTION, SOLUTION INTRAMUSCULAR; INTRAVENOUS ONCE AS NEEDED
Status: DISCONTINUED | OUTPATIENT
Start: 2024-11-27 | End: 2024-11-27 | Stop reason: HOSPADM

## 2024-11-27 RX ORDER — FENTANYL CITRATE/PF 50 MCG/ML
25 SYRINGE (ML) INJECTION
Status: DISCONTINUED | OUTPATIENT
Start: 2024-11-27 | End: 2024-11-27 | Stop reason: HOSPADM

## 2024-11-27 RX ORDER — HYDROMORPHONE HCL/PF 1 MG/ML
0.5 SYRINGE (ML) INJECTION
Status: DISCONTINUED | OUTPATIENT
Start: 2024-11-27 | End: 2024-11-27 | Stop reason: HOSPADM

## 2024-11-27 RX ORDER — CHLORHEXIDINE GLUCONATE ORAL RINSE 1.2 MG/ML
15 SOLUTION DENTAL ONCE
Status: COMPLETED | OUTPATIENT
Start: 2024-11-27 | End: 2024-11-27

## 2024-11-27 RX ORDER — SODIUM CHLORIDE, SODIUM LACTATE, POTASSIUM CHLORIDE, CALCIUM CHLORIDE 600; 310; 30; 20 MG/100ML; MG/100ML; MG/100ML; MG/100ML
INJECTION, SOLUTION INTRAVENOUS CONTINUOUS PRN
Status: DISCONTINUED | OUTPATIENT
Start: 2024-11-27 | End: 2024-11-27

## 2024-11-27 RX ADMIN — CEFAZOLIN SODIUM 2000 MG: 2 SOLUTION INTRAVENOUS at 09:00

## 2024-11-27 RX ADMIN — PROPOFOL 50 MG: 10 INJECTION, EMULSION INTRAVENOUS at 09:09

## 2024-11-27 RX ADMIN — FENTANYL CITRATE 25 MCG: 50 INJECTION INTRAMUSCULAR; INTRAVENOUS at 09:39

## 2024-11-27 RX ADMIN — LIDOCAINE HYDROCHLORIDE 100 MG: 20 INJECTION, SOLUTION EPIDURAL; INFILTRATION; INTRACAUDAL at 09:09

## 2024-11-27 RX ADMIN — PROPOFOL 100 MCG/KG/MIN: 10 INJECTION, EMULSION INTRAVENOUS at 09:10

## 2024-11-27 RX ADMIN — SODIUM CHLORIDE, SODIUM LACTATE, POTASSIUM CHLORIDE, AND CALCIUM CHLORIDE: .6; .31; .03; .02 INJECTION, SOLUTION INTRAVENOUS at 09:00

## 2024-11-27 RX ADMIN — FENTANYL CITRATE 50 MCG: 50 INJECTION INTRAMUSCULAR; INTRAVENOUS at 09:06

## 2024-11-27 RX ADMIN — CHLORHEXIDINE GLUCONATE 15 ML: 1.2 RINSE ORAL at 08:23

## 2024-11-27 RX ADMIN — FENTANYL CITRATE 25 MCG: 50 INJECTION INTRAMUSCULAR; INTRAVENOUS at 09:21

## 2024-11-27 NOTE — ANESTHESIA POSTPROCEDURE EVALUATION
Post-Op Assessment Note    CV Status:  Stable  Pain Score: 0    Pain management: adequate       Mental Status:  Alert and awake   Hydration Status:  Euvolemic   PONV Controlled:  Controlled   Airway Patency:  Patent     Post Op Vitals Reviewed: Yes    No anethesia notable event occurred.    Staff: CRNA           Last Filed PACU Vitals:  Vitals Value Taken Time   Temp 98.3 °F (36.8 °C) 11/27/24 0956   Pulse 51 11/27/24 0956   /73 11/27/24 0956   Resp 16 11/27/24 0956   SpO2 96 % 11/27/24 0956       Modified Gautam:  No data recorded

## 2024-11-27 NOTE — ANESTHESIA POSTPROCEDURE EVALUATION
Post-Op Assessment Note          Two or more mitigation strategies used for obstructive sleep apnea  No anethesia notable event occurred.            Last Filed PACU Vitals:  Vitals Value Taken Time   Temp 98.3 °F (36.8 °C) 11/27/24 0956   Pulse 53 11/27/24 1021   /82 11/27/24 1015   Resp 24 11/27/24 1021   SpO2 98 % 11/27/24 1021   Vitals shown include unfiled device data.    Modified Gautam:  Activity: 2 (11/27/2024 10:54 AM)  Respiration: 2 (11/27/2024 10:54 AM)  Circulation: 2 (11/27/2024 10:54 AM)  Consciousness: 2 (11/27/2024 10:54 AM)  Oxygen Saturation: 2 (11/27/2024 10:54 AM)  Modified Gautam Score: 10 (11/27/2024 10:54 AM)

## 2024-11-27 NOTE — OP NOTE
OPERATIVE REPORT  PATIENT NAME: Kiko Cruz    :  1945  MRN: [id number]  Pt Location: [facility] OR [facility] 06    SURGERY DATE: 2024     Surgeons and Role:     * Sandeep Dietz MD - Primary     * Mere Terry PA-C    Physician Assistant need: A physician assistant was required during the procedure for retraction, tissue handling, dissection, and suturing. No qualified resident was available.    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    Post-Op Diagnosis Codes:     * Osteomyelitis of right long finger distal phalanx    Procedure(s) (LRB):  RIGHT LONG FINGER - AMPUTATION (DISTAL INTERPHALANGEAL JOINT DISARTICULATION) (Right)    Specimen(s):  ID Type Source Tests Collected by Time Destination   1 : RIGHT MID FINGER DISTAL PHALANG Tissue Finger, Right TISSUE EXAM Sandeep Dietz MD 2024  9:27 AM    A :   Finger, Right  Sandeep Dietz MD 2024  9:35 AM    B :   Finger, Right  Sandeep Dietz MD 2024  9:36 AM        Estimated Blood Loss:   Minimal    Drains:  None    Implants:  * No implants in log *    Anesthesia Type:   IV Sedation with Anesthesia    Operative Indications:  Patient is a 79 y.o. male that presented with Right long finger distal phalanx osteomyelitis.  Originally this started with a wound at the tip of the middle finger.  Radiographs were performed on 2024.  His soft tissue was improved and subsequently healed.  We repeated x-rays on 11/15 with progressive erosions of the distal aspect of the distal phalanx.  MRI was performed and consistent with osteomyelitis. Given these findings, we discussed treatment options with irrigation and debridement and antibiotics versus amputation. We discussed risks, benefits, and alternatives to surgery.  We discussed risks of pain, bleeding, stiffness, need for therapy, infection, stump sensitivity, need for further surgeries.  Patient elected to proceed with amputation. Informed  consent was obtained.     Operative Findings:  Right long finger distal phalanx osteomyelitis that was able to be treated with amputation at DIP disarticulation level     Complications:   None     Procedure and Technique:  Patient was identified in the preoperative holding area.  Surgical site was marked with patient participation.  Patient was taken back to the operating theater.  Extremity was prepped and draped in typical fashion.  Formal timeout was performed confirming site, patient, procedure.  All present were in agreement.  A 50-50 mixture of lidocaine 1% and bupivacaine 0.25% were used for digital block. Tourniquet was inflated.    Skin was incised.  Full-thickness skin flaps were elevated at level of distal phalanx.  Extensor mechanism was sharply transected.   The flexor digitorum profundus tendon, placed under tension, and cut sharply. The DIP joint capsule was released. There were no obvious fluid collections or signs of gross infection.    Wound was thoroughly irrigated with 1 L of normal saline.  Skin was closed with 4-0 nylon in near far-far near style.  Wound was dressed with Xeroform, 4 x 4, Irina, finger sock.  Patient was taken to PACU in stable condition.  I was present for all critical portions procedure     Postoperative Plan:  Patient will leave dressings until follow-up in about 2 weeks.  Okay for active range of motion of hand and digits. Plan for a week of bactrim at discharge for any residual soft tissue infection.      Patient Disposition:  Recovery        SIGNATURE: Sandeep Dietz MD  DATE: November 27, 2024  TIME: 9:36 AM

## 2024-11-27 NOTE — INTERVAL H&P NOTE
H&P reviewed. After examining the patient I find no changes in the patients condition since the H&P had been written. After thorough discussion, patient has elected for treatment with finger amputation. Discussed risks, benefits, and alternatives to surgical management. Informed consent was obtained.     Vitals:    11/27/24 0821   BP: (!) 177/84   Pulse: 55   Resp: 16   Temp: (!) 97.3 °F (36.3 °C)   SpO2: 98%

## 2024-11-27 NOTE — DISCHARGE INSTR - AVS FIRST PAGE
Post Operative Instructions    You have had surgery on your arm today, please read and follow the information below:  Elevate your hand above your elbow during the next 24-48 hours to help with swelling.  Place your hand and arm over your head with motion at your shoulder three times a day.  Do not apply any cream/ointment/oil to your incisions including antibiotics (I.e.Neosporin)  Do not use peroxide to clean your wound   Do not soak your hands in standing water (dishwater, tubs, Jacuzzi's, pools, etc.) until given permission (typically 2-3 weeks after injury)    Call the office if you notice any:  Increased numbness or tingling of your hand or fingers that is not relieved with elevation.  Increasing pain that is not controlled with medication.  Difficulty chewing, breathing, swallowing.  Chest pains or shortness of breath.  Fever over 101.4 degrees.    Bandage: Do NOT remove bandage until follow-up appointment.    Motion: Move fingers into a fist 5 times a day, DO NOT move any splinted fingers.    Weight bearing status: Avoid heavy lifting (>2 pounds) with the extremity that was operated on until follow up appointment. Normal activities of daily living are OK.    Ice: Ice for 10 minutes every hour as needed for swelling x 24 hours.    Sling: No sling necessary.    Pain medication:   Oxycodone 1 tab every 6 hours AS NEEDED for pain  *   You may take Tylenol (acetaminophen) in addition to your pain medication. This is over the counter. Please follow the instructions on the bottle. BE AWARE - your pain medication (hydrocodone/oxycodone) may already include acetaminophen in it. If it does, you must include this in your daily amount and make sure not take more than 3000 mg per day.   *   You may take an antiinflammatory medication (i.e. ibuprofen/naproxen) in addition to your pain medication. These are found over the counter, but higher prescription doses are available if needed. Please follow the dosing instructions  on the bottle and it is recommended you take these with food. DO NOT take these medications if you are on a blood thinner, have history of gastrointestinal bleeding, chronic kidney disease, or if you have ever been told by a physician not to. You CAN take this with Tylenol (acetaminophen), but should only take ONE antiinflammatory at a time.    Antibiotics:  Bactrim DS (trimethoprim/sulfamethoxazole) 1 tab every 12 hours     Therapy: No therapy needed at this time.    Follow-up Appointment: 10-14 days.        Please call the office if you have any questions or concerns regarding your post-operative care.

## 2024-11-27 NOTE — ANESTHESIA PREPROCEDURE EVALUATION
Procedure:  AMPUTATION RIGHT LONG FINGER (Right: Hand)    Relevant Problems   CARDIO   (+) CAD S/P percutaneous coronary angioplasty   (+) Chest pain   (+) Coronary artery disease involving native coronary artery of native heart without angina pectoris   (+) Essential hypertension   (+) History of PTCA with 4 stents   (+) PVD (peripheral vascular disease) (HCC)      GI/HEPATIC   (+) Gastroesophageal reflux disease      /RENAL   (+) Prostate cancer (HCC)      MUSCULOSKELETAL   (+) Arthritis   (+) Chronic bilateral low back pain without sciatica      NEURO/PSYCH   (+) Chronic bilateral low back pain without sciatica   (+) Chronic left shoulder pain      PULMONARY   (+) Shortness of breath        Physical Exam    Airway    Mallampati score: I  TM Distance: >3 FB  Neck ROM: full     Dental       Cardiovascular  Cardiovascular exam normal    Pulmonary  Pulmonary exam normal     Other Findings        Anesthesia Plan  ASA Score- 3     Anesthesia Type- IV sedation with anesthesia with ASA Monitors.         Additional Monitors:     Airway Plan:            Plan Factors-Exercise tolerance (METS): >4 METS.    Chart reviewed. EKG reviewed. Imaging results reviewed. Existing labs reviewed. Patient summary reviewed.          Obstructive sleep apnea risk education given perioperatively.        Induction- intravenous.    Postoperative Plan- Plan for postoperative opioid use. Planned trial extubation        Informed Consent- Anesthetic plan and risks discussed with patient.  I personally reviewed this patient with the CRNA. Discussed and agreed on the Anesthesia Plan with the CRNA..

## 2024-11-30 LAB
BACTERIA SPEC ANAEROBE CULT: NO GROWTH
BACTERIA TISS AEROBE CULT: NO GROWTH
GRAM STN SPEC: NORMAL

## 2024-12-04 PROCEDURE — 88311 DECALCIFY TISSUE: CPT | Performed by: PATHOLOGY

## 2024-12-04 PROCEDURE — 88305 TISSUE EXAM BY PATHOLOGIST: CPT | Performed by: PATHOLOGY

## 2024-12-09 DIAGNOSIS — I25.10 CORONARY ARTERY DISEASE INVOLVING NATIVE CORONARY ARTERY OF NATIVE HEART WITHOUT ANGINA PECTORIS: ICD-10-CM

## 2024-12-09 DIAGNOSIS — Z98.61 HISTORY OF PTCA: ICD-10-CM

## 2024-12-10 ENCOUNTER — OFFICE VISIT (OUTPATIENT)
Dept: OBGYN CLINIC | Facility: CLINIC | Age: 79
End: 2024-12-10

## 2024-12-10 VITALS — HEIGHT: 69 IN | WEIGHT: 196 LBS | BODY MASS INDEX: 29.03 KG/M2

## 2024-12-10 DIAGNOSIS — Z98.890 POSTOPERATIVE STATE: Primary | ICD-10-CM

## 2024-12-10 PROCEDURE — 99024 POSTOP FOLLOW-UP VISIT: CPT | Performed by: STUDENT IN AN ORGANIZED HEALTH CARE EDUCATION/TRAINING PROGRAM

## 2024-12-10 NOTE — PROGRESS NOTES
Assessment/Plan:  Patient ID: 79 y.o. male   Surgery: Right Long Finger - Amputation (distal Interphalangeal Joint Disarticulation) - Right  Date of Surgery: 11/27/2024    Discussed pt's pathology vs radiology results.  At this time, pt's incision is healing well.   Discussed that I would like to leave the sutures in for another 1 week.  He can begin to wash the hand and shower.  Discussed scar massage.  Call if any concerns.    Follow Up:  1 week    To Do Next Visit:  Re-evaluation of current issue and suture removal      CHIEF COMPLAINT:  Chief Complaint   Patient presents with   • Post-op     Suture removal         SUBJECTIVE:  Patient is a 79 y.o. year old male who presents for follow up after Right Long Finger - Amputation (distal Interphalangeal Joint Disarticulation) - Right. Today patient states he is doing well. No real pain. No issues with the bandage.      PHYSICAL EXAMINATION:  General: Well developed and well nourished, alert and oriented x 3, appears comfortable  Psychiatric: Normal    MUSCULOSKELETAL EXAMINATION:  See photos  Incision: Clean, dry, intact  Surgery Site: normal, no evidence of infection   Range of Motion: As expected  Neurovascular status: Neuro intact, good cap refill         STUDIES REVIEWED:  No new studies to review       PROCEDURES PERFORMED:  Procedures  No Procedures performed today    Scribe Attestation    I,:  Mere Terry PA-C am acting as a scribe while in the presence of the attending physician.:       I,:  Sandeep Dietz MD personally performed the services described in this documentation    as scribed in my presence.:

## 2024-12-11 DIAGNOSIS — I25.10 CORONARY ARTERY DISEASE INVOLVING NATIVE CORONARY ARTERY OF NATIVE HEART WITHOUT ANGINA PECTORIS: ICD-10-CM

## 2024-12-11 DIAGNOSIS — Z98.61 HISTORY OF PTCA: ICD-10-CM

## 2024-12-11 RX ORDER — ATORVASTATIN CALCIUM 40 MG/1
40 TABLET, FILM COATED ORAL DAILY
Qty: 90 TABLET | Refills: 3 | Status: SHIPPED | OUTPATIENT
Start: 2024-12-11 | End: 2024-12-13 | Stop reason: SDUPTHER

## 2024-12-11 NOTE — TELEPHONE ENCOUNTER
Reason for call:   [x] Refill   [] Prior Auth  [] Other:     Office:   [] PCP/Provider -   [x] Specialty/Provider - card, Dr Orr    Medication:   Atorvastatin 40 mg, 1 qd, 90      Pharmacy:   [facility]    Does the patient have enough for 3 days?   [x] Yes   [] No - Send as HP to POD

## 2024-12-12 RX ORDER — ATORVASTATIN CALCIUM 40 MG/1
40 TABLET, FILM COATED ORAL DAILY
Qty: 90 TABLET | Refills: 1 | OUTPATIENT
Start: 2024-12-12

## 2024-12-13 RX ORDER — ATORVASTATIN CALCIUM 40 MG/1
40 TABLET, FILM COATED ORAL DAILY
Qty: 90 TABLET | Refills: 3 | Status: SHIPPED | OUTPATIENT
Start: 2024-12-13

## 2024-12-14 DIAGNOSIS — Z98.61 HISTORY OF PTCA: ICD-10-CM

## 2024-12-16 RX ORDER — ASPIRIN 81 MG/1
81 TABLET, COATED ORAL DAILY
Qty: 90 TABLET | Refills: 1 | Status: SHIPPED | OUTPATIENT
Start: 2024-12-16

## 2024-12-17 ENCOUNTER — OFFICE VISIT (OUTPATIENT)
Dept: OBGYN CLINIC | Facility: CLINIC | Age: 79
End: 2024-12-17

## 2024-12-17 VITALS — HEIGHT: 69 IN | BODY MASS INDEX: 28.88 KG/M2 | WEIGHT: 195 LBS

## 2024-12-17 DIAGNOSIS — Z98.890 POSTOPERATIVE STATE: Primary | ICD-10-CM

## 2024-12-17 PROCEDURE — 99024 POSTOP FOLLOW-UP VISIT: CPT | Performed by: STUDENT IN AN ORGANIZED HEALTH CARE EDUCATION/TRAINING PROGRAM

## 2024-12-17 NOTE — PROGRESS NOTES
Assessment/Plan:  Patient ID: 79 y.o. male   Surgery: Right Long Finger - Amputation (distal Interphalangeal Joint Disarticulation) - Right  Date of Surgery: 11/27/2024    Patient is doing well 3 weeks post-operatively. His wound is healing well without evidence of infection. His sutures were removed today without complication. He can continue with OTC medication for pain as needed. He can perform scar tissue massage as demonstrated in the office. Continue to expose the tip of his finger to different textures to aid in reducing any hypersensitivity.     Follow Up:  4 weeks    To Do Next Visit:  Re-evaluation of current issue      CHIEF COMPLAINT:  Chief Complaint   Patient presents with   • Post-op     Right long finger amp         SUBJECTIVE:  Patient is a 79 y.o. year old male who presents for follow up after Right Long Finger - Amputation (distal Interphalangeal Joint Disarticulation) - Right. Today patient states he is doing well. No real pain. He only uses dressings when he uses the bathroom, and no longer uses any splint. He denies significant hypersensitivity. Denies drainage, erythema, signs of infection.       PHYSICAL EXAMINATION:  General: Well developed and well nourished, alert and oriented x 3, appears comfortable  Psychiatric: Normal    MUSCULOSKELETAL EXAMINATION:  Incision: Clean, dry, intact. Sutures removed today.   Surgery Site: normal, no evidence of infection   Range of Motion: As expected. Able to achieve composite fist. Good digit extension  Neurovascular status: Neuro intact, good cap refill       STUDIES REVIEWED:  No new studies to review       PROCEDURES PERFORMED:  Procedures  No Procedures performed today    Scribe Attestation    I,:  Claire Pressley am acting as a scribe while in the presence of the attending physician.:       I,:  Sandeep Dietz MD personally performed the services described in this documentation    as scribed in my presence.:

## 2025-01-02 ENCOUNTER — APPOINTMENT (OUTPATIENT)
Dept: LAB | Facility: HOSPITAL | Age: 80
End: 2025-01-02
Attending: SPECIALIST
Payer: COMMERCIAL

## 2025-01-02 DIAGNOSIS — C61 PROSTATE CANCER (HCC): ICD-10-CM

## 2025-01-02 LAB — PSA SERPL-MCNC: 5.68 NG/ML (ref 0–4)

## 2025-01-02 PROCEDURE — 84153 ASSAY OF PSA TOTAL: CPT

## 2025-01-02 PROCEDURE — 36415 COLL VENOUS BLD VENIPUNCTURE: CPT

## 2025-01-14 ENCOUNTER — OFFICE VISIT (OUTPATIENT)
Dept: OBGYN CLINIC | Facility: CLINIC | Age: 80
End: 2025-01-14
Payer: COMMERCIAL

## 2025-01-14 VITALS — HEIGHT: 69 IN | WEIGHT: 195 LBS | BODY MASS INDEX: 28.88 KG/M2

## 2025-01-14 DIAGNOSIS — Z98.890 POSTOPERATIVE STATE: Primary | ICD-10-CM

## 2025-01-14 PROCEDURE — 99213 OFFICE O/P EST LOW 20 MIN: CPT | Performed by: STUDENT IN AN ORGANIZED HEALTH CARE EDUCATION/TRAINING PROGRAM

## 2025-01-14 NOTE — PROGRESS NOTES
ORTHOPAEDIC HAND, WRIST, AND ELBOW OFFICE  VISIT      ASSESSMENT/PLAN:      Diagnoses and all orders for this visit:    Postoperative state          79 y.o. male s/p Right Long Finger - Amputation (distal Interphalangeal Joint Disarticulation) - Right  Date of Surgery: 11/27/2024     Approx 7 weeks s/p above stated surgery. The patient is doing well postoperatively. His surgical incision is well healed. There is no erythema or signs of infection. He has no restrictions at this time. Discussed the wound to his right shin appears to be healing well. There is no drainage of signs of infection. Discussed if there is any concern for infection to follow up with his PCP.    Follow Up:  PRN       To Do Next Visit:           Sandeep Dietz MD  Attending, Orthopaedic Surgery  Hand, Wrist, and Elbow Surgery  [facility]    ______________________________________________________________________________________________    CHIEF COMPLAINT:  Chief Complaint   Patient presents with    Right Middle Finger - Post-op       SUBJECTIVE:  Patient is a 79 y.o. RHD male who presents today for follow up of  Right Long Finger - Amputation (distal Interphalangeal Joint Disarticulation) - Right Date of Surgery: 11/27/2024. The patient states he is doing well. He denies any pain or issues. He denies any complaints. He states part of the nail is growing in. The patient states approx 2 weeks he was getting in the shower when the rug slipped and hit his right shin on the tub. The patient has a wound in the area that he has been putting neosporin on.      Occupation: retired     I have personally reviewed all the relevant PMH, PSH, SH, FH, Medications and allergies      PAST MEDICAL HISTORY:  Past Medical History:   Diagnosis Date    Aortic aneurysm (HCC)     Cardiac disease     Colon polyp     Coronary artery disease     Gout     Hypercholesteremia     Hypertension     Kidney stone     Myocardial infarction (HCC)      5/04/2003: 2/8/2013    Osteoporosis 04/01/2023    pt reports       PAST SURGICAL HISTORY:  Past Surgical History:   Procedure Laterality Date    CARDIAC SURGERY      patient reports having 4 stents placed    COLONOSCOPY  07/2015    Due 7/2020    COLONOSCOPY      HERNIA REPAIR      LITHOTRIPSY      x4    OTHER SURGICAL HISTORY      stents    ME AMP F/TH 1/2 JT/PHALANX W/NEURECT LOCAL FLAP Right 11/27/2024    Procedure: RIGHT LONG FINGER - AMPUTATION (DISTAL INTERPHALANGEAL JOINT DISARTICULATION);  Surgeon: Sandeep Dietz MD;  Location: [facility] OR;  Service: Orthopedics    TONSILLECTOMY      TOTAL HIP ARTHROPLASTY Right     UPPER GASTROINTESTINAL ENDOSCOPY         FAMILY HISTORY:  Family History   Problem Relation Age of Onset    Heart disease Father     Diabetes Paternal Uncle        SOCIAL HISTORY:  Social History     Tobacco Use    Smoking status: Former     Types: Cigarettes    Smokeless tobacco: Never    Tobacco comments:     smoked in high school   Vaping Use    Vaping status: Never Used   Substance Use Topics    Alcohol use: Not Currently    Drug use: Never       MEDICATIONS:    Current Outpatient Medications:     allopurinol (ZYLOPRIM) 300 mg tablet, Take 300 mg by mouth daily, Disp: , Rfl:     Aspirin Low Dose 81 MG EC tablet, TAKE 1 TABLET BY MOUTH DAILY, Disp: 90 tablet, Rfl: 1    atorvastatin (LIPITOR) 40 mg tablet, Take 1 tablet (40 mg total) by mouth daily, Disp: 90 tablet, Rfl: 3    cholecalciferol (VITAMIN D3) 1,000 units tablet, Take 1,000 Units by mouth daily, Disp: , Rfl:     econazole nitrate 1 % cream, Apply topically daily, Disp: 85 g, Rfl: 2    famotidine (PEPCID) 20 mg tablet, TAKE 1 TABLET BY MOUTH  TWICE DAILY, Disp: 180 tablet, Rfl: 3    alendronate (FOSAMAX) 70 mg tablet, Take 70 mg by mouth Once a week, Disp: , Rfl:     chlorhexidine (PERIDEX) 0.12 % solution, , Disp: , Rfl:     Diclofenac Sodium (VOLTAREN) 1 %, Apply 2 g topically 3 (three) times a day as needed (Pain), Disp: 150  g, Rfl: 3    Diclofenac Sodium (VOLTAREN) 1 %, Apply 2 g topically 3 (three) times a day as needed (Pain) (Patient not taking: Reported on 11/25/2024), Disp: 100 g, Rfl: 1    lidocaine (Lidoderm) 5 %, Apply 1 patch topically over 12 hours daily Remove & Discard patch within 12 hours or as directed by MD (Patient not taking: Reported on 11/25/2024), Disp: 30 patch, Rfl: 1    metoprolol succinate (TOPROL-XL) 25 mg 24 hr tablet, Take 0.5 tablets (12.5 mg total) by mouth daily, Disp: 45 tablet, Rfl: 3    nitroglycerin (NITROSTAT) 0.4 mg SL tablet, Place 1 tablet under the tongue every 5 (five) minutes as needed, Disp: , Rfl:     oxyCODONE (ROXICODONE) 5 immediate release tablet, Take 1 tablet (5 mg total) by mouth every 6 (six) hours as needed for severe pain for up to 15 doses Max Daily Amount: 20 mg, Disp: 15 tablet, Rfl: 0    ramipril (ALTACE) 5 mg capsule, TAKE 1 CAPSULE BY MOUTH TWICE  DAILY, Disp: 180 capsule, Rfl: 1    senna-docusate sodium (SENOKOT S) 8.6-50 mg per tablet, Take 1 tablet by mouth 2 (two) times a day, Disp: , Rfl:     ALLERGIES:  No Known Allergies        REVIEW OF SYSTEMS:  Musculoskeletal:        As noted in HPI.   All other systems reviewed and are negative.    VITALS:  There were no vitals filed for this visit.    LABS:  HgA1c:   Lab Results   Component Value Date    HGBA1C 5.5 12/04/2019     BMP:   Lab Results   Component Value Date    CALCIUM 8.8 11/20/2024    K 4.2 11/20/2024    CO2 29 11/20/2024     11/20/2024    BUN 13 11/20/2024    CREATININE 0.86 11/20/2024       _____________________________________________________  PHYSICAL EXAMINATION:  General: Well developed and well nourished, alert & oriented x 3, appears comfortable  Psychiatric: Normal  HEENT: Normocephalic, Atraumatic Trachea Midline, No torticollis  Pulmonary: No audible wheezing or respiratory distress   Abdomen/GI: Non tender, non distended   Cardiovascular: No pitting edema, 2+ radial pulse   Skin: No masses,  erythema, lacerations, fluctation, ulcerations  Neurovascular: Sensation Intact to the Median, Ulnar, Radial Nerve, Motor Intact to the Median, Ulnar, Radial Nerve, and Pulses Intact  Musculoskeletal: Normal, except as noted in detailed exam and in HPI.      MUSCULOSKELETAL EXAMINATION:  Right long finger  Scar well healed  No erythema or signs of infection  Full composite fist           ___________________________________________________  STUDIES REVIEWED:  Prior MRI Right middle finger reviewed  Prior x-ray of right middle finger reviewed        PROCEDURES PERFORMED:  Procedures  No Procedures performed today    _____________________________________________________      Scribe Attestation      I,:  Naye Reddy MA am acting as a scribe while in the presence of the attending physician.:       I,:  Sandeep Dietz MD personally performed the services described in this documentation    as scribed in my presence.:

## 2025-01-14 NOTE — LETTER
January 14, 2025     Patient: Kiko Cruz  YOB: 1945  Date of Visit: 1/14/2025      To Whom it May Concern:    Kiko Cruz is under my professional care. Kiko was seen in my office on 1/14/2025. Kiko right rawls wound is healing well. There is no signs of infection today. He was instructed if there is any change in the wound or concern for infection to follow up with his PCP.    If you have any questions or concerns, please don't hesitate to call.         Sincerely,          Sandeep Dietz MD

## 2025-01-31 DIAGNOSIS — I10 ESSENTIAL HYPERTENSION: Chronic | ICD-10-CM

## 2025-01-31 RX ORDER — RAMIPRIL 5 MG/1
5 CAPSULE ORAL 2 TIMES DAILY
Qty: 180 CAPSULE | Refills: 1 | Status: SHIPPED | OUTPATIENT
Start: 2025-01-31

## 2025-02-04 ENCOUNTER — TELEPHONE (OUTPATIENT)
Dept: OBGYN CLINIC | Facility: MEDICAL CENTER | Age: 80
End: 2025-02-04

## 2025-02-04 ENCOUNTER — TELEPHONE (OUTPATIENT)
Age: 80
End: 2025-02-04

## 2025-02-04 NOTE — TELEPHONE ENCOUNTER
Caller: Patient    Doctor: Dr. Dietz     Reason for call: 1/17 is suppose to be post op but was charged as a follow up     Call back#: [phone]

## 2025-02-04 NOTE — TELEPHONE ENCOUNTER
Called patient to let him know that we emailed billing to waive the $10 fee. He was very appreciative.

## 2025-03-11 ENCOUNTER — OFFICE VISIT (OUTPATIENT)
Dept: CARDIOLOGY CLINIC | Facility: CLINIC | Age: 80
End: 2025-03-11
Payer: COMMERCIAL

## 2025-03-11 VITALS
SYSTOLIC BLOOD PRESSURE: 150 MMHG | BODY MASS INDEX: 29.03 KG/M2 | DIASTOLIC BLOOD PRESSURE: 80 MMHG | OXYGEN SATURATION: 99 % | TEMPERATURE: 97.7 F | HEART RATE: 60 BPM | WEIGHT: 196 LBS | HEIGHT: 69 IN

## 2025-03-11 DIAGNOSIS — E78.5 DYSLIPIDEMIA: ICD-10-CM

## 2025-03-11 DIAGNOSIS — I25.10 CORONARY ARTERY DISEASE INVOLVING NATIVE CORONARY ARTERY OF NATIVE HEART WITHOUT ANGINA PECTORIS: ICD-10-CM

## 2025-03-11 DIAGNOSIS — Z98.61 CAD S/P PERCUTANEOUS CORONARY ANGIOPLASTY: ICD-10-CM

## 2025-03-11 DIAGNOSIS — R60.0 BILATERAL LOWER EXTREMITY EDEMA: ICD-10-CM

## 2025-03-11 DIAGNOSIS — I10 ESSENTIAL HYPERTENSION: Chronic | ICD-10-CM

## 2025-03-11 DIAGNOSIS — I25.10 CAD S/P PERCUTANEOUS CORONARY ANGIOPLASTY: ICD-10-CM

## 2025-03-11 PROCEDURE — 99214 OFFICE O/P EST MOD 30 MIN: CPT

## 2025-03-11 RX ORDER — NITROGLYCERIN 0.4 MG/1
0.4 TABLET SUBLINGUAL
Qty: 30 TABLET | Refills: 1 | Status: SHIPPED | OUTPATIENT
Start: 2025-03-11

## 2025-03-11 RX ORDER — NITROGLYCERIN 0.4 MG/1
0.4 TABLET SUBLINGUAL
Qty: 30 TABLET | Refills: 1 | Status: SHIPPED | OUTPATIENT
Start: 2025-03-11 | End: 2025-03-11 | Stop reason: SDUPTHER

## 2025-03-11 NOTE — PROGRESS NOTES
Kiko Cruz  1945  [id number]  [facility] [address] [phone]    1. Coronary artery disease involving native coronary artery of native heart without angina pectoris  DISCONTINUED: nitroglycerin (NITROSTAT) 0.4 mg SL tablet      2. Essential hypertension        3. CAD S/P percutaneous coronary angioplasty        4. Dyslipidemia        5. Bilateral lower extremity edema            Summary/Discussion:  Coronary artery disease:  - with prior PCI/stents  - NM rx stress test (7/2022): medium sized, moderate intensity, fixed defect of the apical anterior. Gated SPECT revealed apical anterior hypokinesis with calculated LVEF 45%. No ischemia was reported   - echo (6/2022): LVEF 55%, hypokinesis of the apical anterior, apical septal, and apex  - without symptoms of angina  - continue aspirin, statin and beta blocker    Hypertension:  - elevated today. 150/80  reports hx of white coat syndrome   - discussion had on intensifying his antihypertensive therapy for better blood pressure control. Patient deferred at this time and reports that his blood pressure is elevated due to his hx of white coat syndrome and being agitated/stressed related to his son. He does not believe that he needs to be on further therapy given that he overall feels well   further discussion can be had with his primary cardiologist during next office visit if indicated at that time   - continue present medication regimen  - lifestyle modification   - close blood pressure monitoring     Lower extremity edema:  - evidence of lower extremity edema on exam today and weight gain based on chart review. Lower extremity edema does not improve in AM. He also reports symptoms of intermittent shortness of breath; relatively sedentary as baseline due to ambulatory dysfunction with use of a cane   - echo (6/2022): normal LV, LVEF 55%, G1DD, hypokinesis of the apical anterior, apical  septal, and apex, mild MR  - recommended a repeat echo to assess for possible structural abnormalities that could be contributing to the same. He also deferred further testing  - discussion also had on initiation of low dose diuretic with concerns for mild volume overload-- he deferred   further discussion can be had with his primary cardiologist during next office visit   - heart failure education provided with instructions to contact the office for any worsening lower extremity edema and/or weight gain    Dyslipidemia:  - LDL 71 on lipids from 11/2024  - continue atorvastatin 40 mg daily   - encouraged low cholesterol diet and annual lipid follow up    Bradycardia:  - hx of  - heart rate today, 60 bpm  - continue current dose of metoprolol succinate 12.5 mg daily     Anemia:  - hemoglobin: 10.5  - this could also be contributing to his intermittent symptoms of shortness of breath  - continue close monitoring     Interval History: Kiko Cruz is a 79 y.o. year old male with history mentioned in problem list who presents to the office today for routine follow up.     Since his last office visit he does not express any significant cardiac complaints and reports that he overall feels well. He denies any chest pain/pressure/discomfort. He denies orthopnea and PND. He denies lightheadedness, dizziness, and syncope. He denies palpitations.  He does states intermittent symptoms of shortness of breath which is unchanged from prior. He also reports lower extremity edema that does not improve in the morning but states that this has been ongoing/chronic and unchanged. His functional capacity is limited due to ambulatory dysfunction with use of a cane at baseline.       He will RTO in 3 months with Dr. Orr or sooner if necessary. He will call with any concerns.         Medical Problems       Problem List       Chest pain    Essential hypertension (Chronic)    Bradycardia    Dyslipidemia    Prostate cancer (HCC)    CAD  S/P percutaneous coronary angioplasty    History of PTCA with 4 stents    Gastroesophageal reflux disease    Arthritis    PVD (peripheral vascular disease) (Formerly [facility])    Overview Signed 3/19/2021  9:49 AM by Martha Estrada MA   Per CMS/ ICD 10 guidelines         Hyperuricemia    Vitamin D deficiency    Class 1 obesity due to excess calories with serious comorbidity and body mass index (BMI) of 30.0 to 30.9 in adult    Shortness of breath    Chronic bilateral low back pain without sciatica    Compression fracture of body of thoracic vertebra (Formerly [facility])    Coronary artery disease involving native coronary artery of native heart without angina pectoris    Chronic left shoulder pain        Past Medical History:   Diagnosis Date    Aortic aneurysm (Formerly [facility])     Cardiac disease     Colon polyp     Coronary artery disease     Gout     Hypercholesteremia     Hypertension     Kidney stone     Myocardial infarction (Formerly [facility])     5/04/2003: 2/8/2013    Osteoporosis 04/01/2023    pt reports     Social History     Socioeconomic History    Marital status: /Civil Union     Spouse name: Not on file    Number of children: Not on file    Years of education: Not on file    Highest education level: Not on file   Occupational History    Not on file   Tobacco Use    Smoking status: Former     Types: Cigarettes    Smokeless tobacco: Never    Tobacco comments:     smoked in high school   Vaping Use    Vaping status: Never Used   Substance and Sexual Activity    Alcohol use: Not Currently    Drug use: Never    Sexual activity: Not Currently     Partners: Female   Other Topics Concern    Not on file   Social History Narrative    Not on file     Social Drivers of Health     Financial Resource Strain: Not on file   Food Insecurity: Not on file   Transportation Needs: Not on file   Physical Activity: Not on file   Stress: Not on file   Social Connections: Not on file   Intimate Partner Violence: Not on file   Housing Stability: Not on file      Family  History   Problem Relation Age of Onset    Heart disease Father     Diabetes Paternal Uncle      Past Surgical History:   Procedure Laterality Date    CARDIAC SURGERY      patient reports having 4 stents placed    COLONOSCOPY  07/2015    Due 7/2020    COLONOSCOPY      HERNIA REPAIR      LITHOTRIPSY      x4    OTHER SURGICAL HISTORY      stents    MT AMP F/TH 1/2 JT/PHALANX W/NEURECT LOCAL FLAP Right 11/27/2024    Procedure: RIGHT LONG FINGER - AMPUTATION (DISTAL INTERPHALANGEAL JOINT DISARTICULATION);  Surgeon: Sandeep Dietz MD;  Location[facility];  Service: Orthopedics    TONSILLECTOMY      TOTAL HIP ARTHROPLASTY Right     UPPER GASTROINTESTINAL ENDOSCOPY         Current Outpatient Medications:     alendronate (FOSAMAX) 70 mg tablet, Take 70 mg by mouth Once a week, Disp: , Rfl:     allopurinol (ZYLOPRIM) 300 mg tablet, Take 300 mg by mouth daily, Disp: , Rfl:     Aspirin Low Dose 81 MG EC tablet, TAKE 1 TABLET BY MOUTH DAILY, Disp: 90 tablet, Rfl: 1    atorvastatin (LIPITOR) 40 mg tablet, Take 1 tablet (40 mg total) by mouth daily, Disp: 90 tablet, Rfl: 3    cholecalciferol (VITAMIN D3) 1,000 units tablet, Take 1,000 Units by mouth daily, Disp: , Rfl:     Diclofenac Sodium (VOLTAREN) 1 %, Apply 2 g topically 3 (three) times a day as needed (Pain), Disp: 150 g, Rfl: 3    Diclofenac Sodium (VOLTAREN) 1 %, Apply 2 g topically 3 (three) times a day as needed (Pain), Disp: 100 g, Rfl: 1    famotidine (PEPCID) 20 mg tablet, TAKE 1 TABLET BY MOUTH  TWICE DAILY, Disp: 180 tablet, Rfl: 3    metoprolol succinate (TOPROL-XL) 25 mg 24 hr tablet, Take 0.5 tablets (12.5 mg total) by mouth daily, Disp: 45 tablet, Rfl: 3    ramipril (ALTACE) 5 mg capsule, TAKE 1 CAPSULE BY MOUTH TWICE  DAILY, Disp: 180 capsule, Rfl: 1    senna-docusate sodium (SENOKOT S) 8.6-50 mg per tablet, Take 1 tablet by mouth 2 (two) times a day, Disp: , Rfl:     chlorhexidine (PERIDEX) 0.12 % solution, , Disp: , Rfl:     econazole nitrate 1 %  "cream, Apply topically daily (Patient not taking: Reported on 3/11/2025), Disp: 85 g, Rfl: 2    lidocaine (Lidoderm) 5 %, Apply 1 patch topically over 12 hours daily Remove & Discard patch within 12 hours or as directed by MD (Patient not taking: Reported on 11/25/2024), Disp: 30 patch, Rfl: 1    nitroglycerin (NITROSTAT) 0.4 mg SL tablet, Place 1 tablet (0.4 mg total) under the tongue every 5 (five) minutes as needed for chest pain, Disp: 30 tablet, Rfl: 1    oxyCODONE (ROXICODONE) 5 immediate release tablet, Take 1 tablet (5 mg total) by mouth every 6 (six) hours as needed for severe pain for up to 15 doses Max Daily Amount: 20 mg (Patient not taking: Reported on 3/11/2025), Disp: 15 tablet, Rfl: 0  No Known Allergies    Labs:     Chemistry        Component Value Date/Time    K 4.2 11/20/2024 1036    K 4.8 05/25/2023 0844     11/20/2024 1036     05/25/2023 0844    CO2 29 11/20/2024 1036    CO2 29 05/25/2023 0844    BUN 13 11/20/2024 1036    BUN 13 05/25/2023 0844    CREATININE 0.86 11/20/2024 1036    CREATININE 0.78 05/25/2023 0844        Component Value Date/Time    CALCIUM 8.8 11/20/2024 1036    CALCIUM 9.0 05/25/2023 0844    ALKPHOS 99 12/22/2023 0332    ALKPHOS 131 (H) 05/25/2023 0844    AST 24 12/22/2023 0332    AST 21 05/25/2023 0844    ALT 17 12/22/2023 0332    ALT 15 05/25/2023 0844            No results found for: \"CHOL\"  Lab Results   Component Value Date    HDL 41 06/14/2022    HDL 53 12/13/2021    HDL 46 12/04/2019     Lab Results   Component Value Date    LDLCALC 63 06/14/2022    LDLCALC 73 12/13/2021    LDLCALC 66 12/04/2019     Lab Results   Component Value Date    TRIG 83 06/14/2022    TRIG 58 12/13/2021    TRIG 55 12/04/2019     No results found for: \"CHOLHDL\"    Imaging: No results found.    ECG:  n/a    Review of Systems   Cardiovascular:  Positive for dyspnea on exertion (intermittent/unchanged) and leg swelling (baseline).   Respiratory:  Positive for shortness of breath " "(intermittent/unchanged).    Musculoskeletal:  Positive for muscle weakness.   All other systems reviewed and are negative.      Vitals:    03/11/25 1433   BP: 150/80   Pulse: 60   Temp: 97.7 °F (36.5 °C)   SpO2: 99%     Vitals:    03/11/25 1433   Weight: 88.9 kg (196 lb)     Height: 5' 9\" (175.3 cm)   Body mass index is 28.94 kg/m².    Physical Exam  Vitals and nursing note reviewed.   Constitutional:       General: He is not in acute distress.     Appearance: Normal appearance.   HENT:      Head: Normocephalic.      Nose: Nose normal.      Mouth/Throat:      Mouth: Mucous membranes are moist.      Pharynx: Oropharynx is clear.   Cardiovascular:      Rate and Rhythm: Normal rate and regular rhythm.      Pulses: Normal pulses.      Heart sounds: Normal heart sounds. No murmur heard.  Pulmonary:      Breath sounds: Normal breath sounds.   Musculoskeletal:      Cervical back: Normal range of motion.      Right lower leg: Edema present.      Left lower leg: Edema present.   Skin:     General: Skin is warm and dry.   Neurological:      Mental Status: He is alert.      Motor: Weakness present.      Gait: Gait abnormal.   Psychiatric:         Mood and Affect: Mood normal.         Behavior: Behavior normal.        "

## 2025-03-11 NOTE — TELEPHONE ENCOUNTER
Patient requesting nitro to go [facility] home delivery not to retail please resend   : Receipt confirmed by pharmacy (3/11/2025  3:55 PM EDT)

## 2025-04-28 ENCOUNTER — OFFICE VISIT (OUTPATIENT)
Dept: OBGYN CLINIC | Facility: CLINIC | Age: 80
End: 2025-04-28
Payer: COMMERCIAL

## 2025-04-28 ENCOUNTER — TELEPHONE (OUTPATIENT)
Age: 80
End: 2025-04-28

## 2025-04-28 ENCOUNTER — HOSPITAL ENCOUNTER (OUTPATIENT)
Dept: RADIOLOGY | Facility: HOSPITAL | Age: 80
Discharge: HOME | End: 2025-04-28
Attending: ORTHOPAEDIC SURGERY
Payer: COMMERCIAL

## 2025-04-28 VITALS — HEIGHT: 69 IN | BODY MASS INDEX: 29.18 KG/M2 | WEIGHT: 197 LBS

## 2025-04-28 DIAGNOSIS — M25.511 RIGHT SHOULDER PAIN, UNSPECIFIED CHRONICITY: ICD-10-CM

## 2025-04-28 DIAGNOSIS — S40.011A CONTUSION OF RIGHT SHOULDER, INITIAL ENCOUNTER: Primary | ICD-10-CM

## 2025-04-28 PROCEDURE — 99214 OFFICE O/P EST MOD 30 MIN: CPT | Performed by: ORTHOPAEDIC SURGERY

## 2025-04-28 PROCEDURE — 73030 X-RAY EXAM OF SHOULDER: CPT

## 2025-04-28 RX ORDER — LIDOCAINE 50 MG/G
1 PATCH TOPICAL DAILY
Qty: 90 PATCH | Refills: 0 | Status: SHIPPED | OUTPATIENT
Start: 2025-04-28

## 2025-04-28 NOTE — ASSESSMENT & PLAN NOTE
Physical examination and images reviewed with the patient. They are consistent with right shoulder contusion with tenderness to palpation in the infraspinatus fossa. He has retained strength and appropriate range of motion of the shoulder. He was counseled to perform shoulder range of motion exercises to prevent stiffness. He was given Diclofenac gel and lidocaine patches and was instructed to follow up PRN      Orders:    Diclofenac Sodium (VOLTAREN) 1 %; Apply 2 g topically 4 (four) times a day    lidocaine (Lidoderm) 5 %; Apply 1 patch topically over 12 hours daily Remove & Discard patch within 12 hours or as directed by MD    Ambulatory Referral to Physical Therapy; Future

## 2025-04-28 NOTE — TELEPHONE ENCOUNTER
PA for LIDOCAINE 5% SUBMITTED to    [employer]  via    []CMM-KEY:    [x]Surescripts-Case ID # [id number]    []Availity-Auth ID #   NDC #    []Faxed to plan   []Other website    []Phone call Case ID #       [x]PA sent as URGENT    All office notes, labs and other pertaining documents and studies sent. Clinical questions answered. Awaiting determination from insurance company.     Turnaround time for your insurance to make a decision on your Prior Authorization can take 7-21 business days.

## 2025-04-28 NOTE — TELEPHONE ENCOUNTER
PA for LIDOCAINE 5% APPROVED     Date(s) approved April 28, 2025 to April 28, 2026     Case #    Patient advised by          []MyChart Message  [x]Phone call   []LMOM  []L/M to call office as no active Communication consent on file  []Unable to leave detailed message as VM not approved on Communication consent       Pharmacy advised by    [x]Fax  []Phone call  []Secure Chat    Specialty Pharmacy    []     Approval letter scanned into Media Yes

## 2025-04-28 NOTE — PROGRESS NOTES
Assessment & Plan  Right shoulder pain, unspecified chronicity    Orders:    XR shoulder 2+ vw right; Future    Contusion of right shoulder, initial encounter  Physical examination and images reviewed with the patient. They are consistent with right shoulder contusion with tenderness to palpation in the infraspinatus fossa. He has retained strength and appropriate range of motion of the shoulder. He was counseled to perform shoulder range of motion exercises to prevent stiffness. He was given Diclofenac gel and lidocaine patches and was instructed to follow up PRN      Orders:    Diclofenac Sodium (VOLTAREN) 1 %; Apply 2 g topically 4 (four) times a day    lidocaine (Lidoderm) 5 %; Apply 1 patch topically over 12 hours daily Remove & Discard patch within 12 hours or as directed by MD    Ambulatory Referral to Physical Therapy; Future           Kiko Cruz  [id number]  1945    ORTHOPAEDIC SURGERY OUTPATIENT NOTE  4/28/2025      HISTORY:  79 y.o. male  who presents to the office for initial evaluation for right shoulder pain after a ground level fall yesterday  at home. He reports worsening pain in the posterior shoulder that improves with lidocaine patches and rest. The pain has improved since yesterday. He denies numbness or tingling.     Past Medical History:   Diagnosis Date    Aortic aneurysm (HCC)     Cardiac disease     Colon polyp     Coronary artery disease     Gout     Hypercholesteremia     Hypertension     Kidney stone     Myocardial infarction (HCC)     5/04/2003: 2/8/2013    Osteoporosis 04/01/2023    pt reports       Past Surgical History:   Procedure Laterality Date    CARDIAC SURGERY      patient reports having 4 stents placed    COLONOSCOPY  07/2015    Due 7/2020    COLONOSCOPY      HERNIA REPAIR      LITHOTRIPSY      x4    OTHER SURGICAL HISTORY      stents    AZ AMP F/TH 1/2 JT/PHALANX W/NEURECT LOCAL FLAP Right 11/27/2024    Procedure: RIGHT LONG FINGER - AMPUTATION (DISTAL INTERPHALANGEAL  JOINT DISARTICULATION);  Surgeon: Sandeep Dietz MD;  Location: [facility] OR;  Service: Orthopedics    TONSILLECTOMY      TOTAL HIP ARTHROPLASTY Right     UPPER GASTROINTESTINAL ENDOSCOPY         Social History     Socioeconomic History    Marital status: /Civil Union     Spouse name: Not on file    Number of children: Not on file    Years of education: Not on file    Highest education level: Not on file   Occupational History    Not on file   Tobacco Use    Smoking status: Former     Types: Cigarettes    Smokeless tobacco: Never    Tobacco comments:     smoked in high school   Vaping Use    Vaping status: Never Used   Substance and Sexual Activity    Alcohol use: Not Currently    Drug use: Never    Sexual activity: Not Currently     Partners: Female   Other Topics Concern    Not on file   Social History Narrative    Not on file     Social Drivers of Health     Financial Resource Strain: Not on file   Food Insecurity: Not on file   Transportation Needs: Not on file   Physical Activity: Not on file   Stress: Not on file   Social Connections: Not on file   Intimate Partner Violence: Not on file   Housing Stability: Not on file       Family History   Problem Relation Age of Onset    Heart disease Father     Diabetes Paternal Uncle         Patient's Medications   New Prescriptions    No medications on file   Previous Medications    ALENDRONATE (FOSAMAX) 70 MG TABLET    Take 70 mg by mouth Once a week    ALLOPURINOL (ZYLOPRIM) 300 MG TABLET    Take 300 mg by mouth daily    ASPIRIN LOW DOSE 81 MG EC TABLET    TAKE 1 TABLET BY MOUTH DAILY    ATORVASTATIN (LIPITOR) 40 MG TABLET    Take 1 tablet (40 mg total) by mouth daily    CHLORHEXIDINE (PERIDEX) 0.12 % SOLUTION        CHOLECALCIFEROL (VITAMIN D3) 1,000 UNITS TABLET    Take 1,000 Units by mouth daily    DICLOFENAC SODIUM (VOLTAREN) 1 %    Apply 2 g topically 3 (three) times a day as needed (Pain)    DICLOFENAC SODIUM (VOLTAREN) 1 %    Apply 2 g topically 3  "(three) times a day as needed (Pain)    ECONAZOLE NITRATE 1 % CREAM    Apply topically daily    FAMOTIDINE (PEPCID) 20 MG TABLET    TAKE 1 TABLET BY MOUTH  TWICE DAILY    LIDOCAINE (LIDODERM) 5 %    Apply 1 patch topically over 12 hours daily Remove & Discard patch within 12 hours or as directed by MD    METOPROLOL SUCCINATE (TOPROL-XL) 25 MG 24 HR TABLET    Take 0.5 tablets (12.5 mg total) by mouth daily    NITROGLYCERIN (NITROSTAT) 0.4 MG SL TABLET    Place 1 tablet (0.4 mg total) under the tongue every 5 (five) minutes as needed for chest pain    OXYCODONE (ROXICODONE) 5 IMMEDIATE RELEASE TABLET    Take 1 tablet (5 mg total) by mouth every 6 (six) hours as needed for severe pain for up to 15 doses Max Daily Amount: 20 mg    RAMIPRIL (ALTACE) 5 MG CAPSULE    TAKE 1 CAPSULE BY MOUTH TWICE  DAILY    SENNA-DOCUSATE SODIUM (SENOKOT S) 8.6-50 MG PER TABLET    Take 1 tablet by mouth 2 (two) times a day   Modified Medications    No medications on file   Discontinued Medications    No medications on file       No Known Allergies     Ht 5' 9\" (1.753 m)   Wt 89.4 kg (197 lb)   BMI 29.09 kg/m²      REVIEW OF SYSTEMS:  Constitutional: Negative.    HEENT: Negative.    Respiratory: Negative.    Skin: Negative.    Neurological: Negative.    Psychiatric/Behavioral: Negative.  Musculoskeletal: Negative except for that mentioned in the HPI.    Gen: No acute distress, resting comfortably in bed  HEENT: Eyes clear, moist mucus membranes, hearing intact  Respiratory: No audible wheezing or stridor  Cardiovascular: Well Perfused peripherally, 2+ distal pulse  Abdomen: nondistended, no peritoneal signs     PHYSICAL EXAM:    RIGHT SHOULDER:    Appearance: well appearing. No lesions or swelling. No ecchymosis     Forward flexion:   180 degrees   Abduction:  180 degrees   External rotation at 90 degrees abduction:   90 degrees   Internal rotation at 90 degrees abduction:  90 degrees   External rotation at 0 degrees:   70 degrees "   Internal rotation: T7     STRENGTH:  Forward flexion:  5/5   Abduction:  5/5   External rotation:  5/5   Internal rotation:  5/5        Speed test: Negative  Yergason's: Negative   Tender to palpation ACJ (acromioclavicular joint): Negative   Tender to palpation LHB (long head of biceps): Negative  Ling test: Negative  Latrobe test: Negative  Hornblower's: Negative  Lift off: Negative  Belly press: Negative  Bear hug: Negative  External lag sign: Negative  Cross-body adduction: Negative  Sulcus sign: Negative  Areli's test: Negative  Drop arm test: negative    Radial/median/ulnar nerve intact    <2 sec cap refill        IMAGING:  XR of the right shoulder demonstrates no fracture or dislocation. No lesions.

## 2025-04-29 ENCOUNTER — TELEPHONE (OUTPATIENT)
Dept: OBGYN CLINIC | Facility: MEDICAL CENTER | Age: 80
End: 2025-04-29

## 2025-04-29 NOTE — TELEPHONE ENCOUNTER
Caller: Ysabel    Doctor: Dr. Moffett    Reason for call: Patients wife is calling and asking for a copy of exercises  that were to be given at the patient's OV yesterday.  Right shoulder     She is asking if they may please be mailed out to the patient.    Thank you         Call back#: [phone]

## 2025-04-29 NOTE — TELEPHONE ENCOUNTER
Called and spoke with pt.'s wife, told her I placed it in an envelope and they will be mailing it today. She thanked me for the phone call.

## 2025-05-15 ENCOUNTER — TELEPHONE (OUTPATIENT)
Age: 80
End: 2025-05-15

## 2025-05-15 NOTE — TELEPHONE ENCOUNTER
Pt's wife named Ysabel called to schedule pt's appt due to blood report very low. Offered first available appt but was denied and stated need something sooner. Warm transferred over to triage nurse for further assistance

## 2025-05-15 NOTE — TELEPHONE ENCOUNTER
Had blood work done through [facility].  Blood counts are low.  Hemoglobin 9.1.  Continuously dropping.  Was recommended to see GI ASAP.  Denies blood or black stool.

## 2025-05-19 ENCOUNTER — TELEPHONE (OUTPATIENT)
Age: 80
End: 2025-05-19

## 2025-05-19 NOTE — TELEPHONE ENCOUNTER
Pt's wife called stated that  has an appt with Jesu Rivera and would like to know if lab work was in done in [facility]. I advised that there's lab work that is in done on 5- from [facility]. Pt understood and no further questions

## 2025-05-21 ENCOUNTER — OFFICE VISIT (OUTPATIENT)
Dept: GASTROENTEROLOGY | Facility: AMBULARY SURGERY CENTER | Age: 80
End: 2025-05-21

## 2025-05-21 VITALS
DIASTOLIC BLOOD PRESSURE: 80 MMHG | HEIGHT: 69 IN | OXYGEN SATURATION: 96 % | WEIGHT: 199.2 LBS | BODY MASS INDEX: 29.51 KG/M2 | SYSTOLIC BLOOD PRESSURE: 138 MMHG | HEART RATE: 49 BPM

## 2025-05-21 DIAGNOSIS — D50.9 IRON DEFICIENCY ANEMIA, UNSPECIFIED IRON DEFICIENCY ANEMIA TYPE: Primary | ICD-10-CM

## 2025-05-21 NOTE — H&P (VIEW-ONLY)
Name: Kiko Cruz      : 1945      MRN: [id number]  Encounter Provider: Jesu Rivera PA-C  Encounter Date: 2025   Encounter department: [facility] [place]  :  Assessment & Plan  Iron deficiency anemia, unspecified iron deficiency anemia type  No gross GI bleeding the patient appears to have gradually developed microcytic iron deficiency anemia over the last year with hemoglobin in the nines, compared with 12's.  Rule out chronic occult GI blood loss from upper or lower sources.  Plausible source would be Ludwin lesions from known large hiatal hernia    - Plan for EGD and colonoscopy    - Procedures were explained in detail to patient at this time including associated risks and benefits    - Prescription and instructions provided for colonoscopy prep    - Continue with Pepcid twice daily for now    - Continue follow-up with PCP and monitoring of labs    - Would recommend more urgent evaluation if patient develops significant symptoms of worsening shortness of breath, dizziness/presyncope/syncope, chest pain/dyspnea on exertion, etc.    - If EGD and colonoscopy prove unrevealing for apparent GI source of anemia, then will likely recommend capsule endoscopy and hematology consultation to follow-up    - If EGD is revealing for significant Ludwin lesions or peptic ulcer disease, then we will likely recommend changing H2 blocker therapy to PPI therapy for acid reduction    Orders:    Colonoscopy; Future    EGD; Future        History of Present Illness     Kiko Cruz is a 79 y.o. male with a history of coronary artery disease who presents for follow-up, he was last seen by our service about 3 years ago in early  for evaluation of mildly elevated alkaline phosphatase level and ultrasound findings of small liver cysts and possible liver hemangioma.      Last EGD and colonoscopy were done 2021, colonoscopy seeing diverticulosis and the removal of a polyp  from the descending colon (non-adenomatous on biopsy), EGD (for evaluation of dysphagia and GERD) noted 5 cm hiatal hernia without Ludwin lesions, and some mild GEJ erythema (negative for H.pylori).    Currently, patient made appointment with us for today for evaluation of anemia, as he had recent lab work on 5/13 with [facility] indicating hemoglobin of 9.1 with depressed MCV of 74, in contrast with a hemoglobin of 12.8 and MCV of 98 as of 1 year ago.    Patient says he is experiencing some degree of dyspnea on exertion which has been going on for a while and seems to have been with gradual onset.  He believes his weight has been stable.  Takes Pepcid twice daily for acid reflux which has generally been helpful.  Reports having regular bowel movements.  Denies any rectal bleeding or melena.    HPI    Review of Systems   Constitutional:  Negative for activity change, appetite change, chills, fatigue, fever and unexpected weight change.   HENT:  Negative for congestion, nosebleeds, rhinorrhea, sore throat and trouble swallowing.    Eyes:  Negative for pain, itching and visual disturbance.   Respiratory:  Negative for cough, shortness of breath and wheezing.    Cardiovascular:  Negative for chest pain, palpitations and leg swelling.   Gastrointestinal:  Negative for constipation, diarrhea, nausea and vomiting.   Endocrine: Negative for cold intolerance, heat intolerance and polyuria.   Genitourinary:  Negative for difficulty urinating, dysuria, flank pain, frequency and hematuria.   Musculoskeletal:  Negative for arthralgias, back pain, myalgias and neck pain.   Skin:  Negative for color change, pallor and rash.   Neurological:  Negative for dizziness, speech difficulty, weakness, numbness and headaches.   Hematological:  Does not bruise/bleed easily.   Psychiatric/Behavioral:  Negative for dysphoric mood and sleep disturbance. The patient is not nervous/anxious.     A complete review of systems is negative other than that  "noted above in the HPI.      Current Medications[1]  Objective   /80 (BP Location: Right arm, Patient Position: Sitting, Cuff Size: Standard)   Pulse (!) 49   Ht 5' 9\" (1.753 m)   Wt 90.4 kg (199 lb 3.2 oz)   SpO2 96%   BMI 29.42 kg/m²     Physical Exam  Constitutional:       General: He is not in acute distress.     Appearance: He is well-developed. He is not diaphoretic.   HENT:      Head: Normocephalic and atraumatic.     Eyes:      Conjunctiva/sclera: Conjunctivae normal.      Pupils: Pupils are equal, round, and reactive to light.       Cardiovascular:      Rate and Rhythm: Normal rate and regular rhythm.      Heart sounds: Normal heart sounds. No murmur heard.     No friction rub. No gallop.   Pulmonary:      Effort: Pulmonary effort is normal. No respiratory distress.      Breath sounds: Normal breath sounds. No stridor. No wheezing or rales.   Abdominal:      General: Bowel sounds are normal. There is no distension.      Palpations: Abdomen is soft. There is no mass.      Tenderness: There is no abdominal tenderness. There is no guarding or rebound.     Musculoskeletal:         General: No tenderness. Normal range of motion.      Cervical back: Normal range of motion and neck supple.     Skin:     General: Skin is warm and dry.      Coloration: Skin is not pale.      Findings: No erythema or rash.     Neurological:      Mental Status: He is alert and oriented to person, place, and time.     Psychiatric:         Behavior: Behavior normal.            Lab Results: I personally reviewed relevant lab results.       Results for orders placed during the hospital encounter of 01/14/21    Colonoscopy    Impression  1.  Small polyp removed.  2. Diverticulosis scattered throughout the colon.  3. Long redundant colon.    RECOMMENDATION:  Repeat in 5 years due to a personal history of colon polyps, complications of diverticulosis discussed.  Follow-up in my office as needed               [1]   Current " Outpatient Medications   Medication Sig Dispense Refill    allopurinol (ZYLOPRIM) 300 mg tablet Take 300 mg by mouth in the morning.      Aspirin Low Dose 81 MG EC tablet TAKE 1 TABLET BY MOUTH DAILY 90 tablet 1    atorvastatin (LIPITOR) 40 mg tablet Take 1 tablet (40 mg total) by mouth daily 90 tablet 3    cholecalciferol (VITAMIN D3) 1,000 units tablet Take 1,000 Units by mouth in the morning.      Diclofenac Sodium (VOLTAREN) 1 % Apply 2 g topically 3 (three) times a day as needed (Pain) 150 g 3    Diclofenac Sodium (VOLTAREN) 1 % Apply 2 g topically 4 (four) times a day 350 g 0    famotidine (PEPCID) 20 mg tablet TAKE 1 TABLET BY MOUTH  TWICE DAILY 180 tablet 3    lidocaine (Lidoderm) 5 % Apply 1 patch topically over 12 hours daily Remove & Discard patch within 12 hours or as directed by MD 90 patch 0    metoprolol succinate (TOPROL-XL) 25 mg 24 hr tablet Take 0.5 tablets (12.5 mg total) by mouth daily 45 tablet 3    nitroglycerin (NITROSTAT) 0.4 mg SL tablet Place 1 tablet (0.4 mg total) under the tongue every 5 (five) minutes as needed for chest pain 30 tablet 1    ramipril (ALTACE) 5 mg capsule TAKE 1 CAPSULE BY MOUTH TWICE  DAILY 180 capsule 1    alendronate (FOSAMAX) 70 mg tablet Take 70 mg by mouth Once a week      chlorhexidine (PERIDEX) 0.12 % solution  (Patient not taking: Reported on 2/22/2024)      Diclofenac Sodium (VOLTAREN) 1 % Apply 2 g topically 3 (three) times a day as needed (Pain) (Patient not taking: Reported on 4/28/2025) 100 g 1    econazole nitrate 1 % cream Apply topically daily (Patient not taking: Reported on 3/11/2025) 85 g 2    lidocaine (Lidoderm) 5 % Apply 1 patch topically over 12 hours daily Remove & Discard patch within 12 hours or as directed by MD (Patient not taking: Reported on 11/25/2024) 30 patch 1    oxyCODONE (ROXICODONE) 5 immediate release tablet Take 1 tablet (5 mg total) by mouth every 6 (six) hours as needed for severe pain for up to 15 doses Max Daily Amount: 20 mg  (Patient not taking: Reported on 3/11/2025) 15 tablet 0    senna-docusate sodium (SENOKOT S) 8.6-50 mg per tablet Take 1 tablet by mouth 2 (two) times a day       No current facility-administered medications for this visit.

## 2025-05-21 NOTE — PROGRESS NOTES
Name: Kiko Cruz      : 1945      MRN: [id number]  Encounter Provider: Jesu Rivera PA-C  Encounter Date: 2025   Encounter department: [facility] [place]  :  Assessment & Plan  Iron deficiency anemia, unspecified iron deficiency anemia type  No gross GI bleeding the patient appears to have gradually developed microcytic iron deficiency anemia over the last year with hemoglobin in the nines, compared with 12's.  Rule out chronic occult GI blood loss from upper or lower sources.  Plausible source would be Ludwin lesions from known large hiatal hernia    - Plan for EGD and colonoscopy    - Procedures were explained in detail to patient at this time including associated risks and benefits    - Prescription and instructions provided for colonoscopy prep    - Continue with Pepcid twice daily for now    - Continue follow-up with PCP and monitoring of labs    - Would recommend more urgent evaluation if patient develops significant symptoms of worsening shortness of breath, dizziness/presyncope/syncope, chest pain/dyspnea on exertion, etc.    - If EGD and colonoscopy prove unrevealing for apparent GI source of anemia, then will likely recommend capsule endoscopy and hematology consultation to follow-up    - If EGD is revealing for significant Ludwin lesions or peptic ulcer disease, then we will likely recommend changing H2 blocker therapy to PPI therapy for acid reduction    Orders:    Colonoscopy; Future    EGD; Future        History of Present Illness     Kiko Cruz is a 79 y.o. male with a history of coronary artery disease who presents for follow-up, he was last seen by our service about 3 years ago in early  for evaluation of mildly elevated alkaline phosphatase level and ultrasound findings of small liver cysts and possible liver hemangioma.      Last EGD and colonoscopy were done 2021, colonoscopy seeing diverticulosis and the removal of a polyp  from the descending colon (non-adenomatous on biopsy), EGD (for evaluation of dysphagia and GERD) noted 5 cm hiatal hernia without Ludwin lesions, and some mild GEJ erythema (negative for H.pylori).    Currently, patient made appointment with us for today for evaluation of anemia, as he had recent lab work on 5/13 with [facility] indicating hemoglobin of 9.1 with depressed MCV of 74, in contrast with a hemoglobin of 12.8 and MCV of 98 as of 1 year ago.    Patient says he is experiencing some degree of dyspnea on exertion which has been going on for a while and seems to have been with gradual onset.  He believes his weight has been stable.  Takes Pepcid twice daily for acid reflux which has generally been helpful.  Reports having regular bowel movements.  Denies any rectal bleeding or melena.    HPI    Review of Systems   Constitutional:  Negative for activity change, appetite change, chills, fatigue, fever and unexpected weight change.   HENT:  Negative for congestion, nosebleeds, rhinorrhea, sore throat and trouble swallowing.    Eyes:  Negative for pain, itching and visual disturbance.   Respiratory:  Negative for cough, shortness of breath and wheezing.    Cardiovascular:  Negative for chest pain, palpitations and leg swelling.   Gastrointestinal:  Negative for constipation, diarrhea, nausea and vomiting.   Endocrine: Negative for cold intolerance, heat intolerance and polyuria.   Genitourinary:  Negative for difficulty urinating, dysuria, flank pain, frequency and hematuria.   Musculoskeletal:  Negative for arthralgias, back pain, myalgias and neck pain.   Skin:  Negative for color change, pallor and rash.   Neurological:  Negative for dizziness, speech difficulty, weakness, numbness and headaches.   Hematological:  Does not bruise/bleed easily.   Psychiatric/Behavioral:  Negative for dysphoric mood and sleep disturbance. The patient is not nervous/anxious.     A complete review of systems is negative other than that  "noted above in the HPI.      Current Medications[1]  Objective   /80 (BP Location: Right arm, Patient Position: Sitting, Cuff Size: Standard)   Pulse (!) 49   Ht 5' 9\" (1.753 m)   Wt 90.4 kg (199 lb 3.2 oz)   SpO2 96%   BMI 29.42 kg/m²     Physical Exam  Constitutional:       General: He is not in acute distress.     Appearance: He is well-developed. He is not diaphoretic.   HENT:      Head: Normocephalic and atraumatic.     Eyes:      Conjunctiva/sclera: Conjunctivae normal.      Pupils: Pupils are equal, round, and reactive to light.       Cardiovascular:      Rate and Rhythm: Normal rate and regular rhythm.      Heart sounds: Normal heart sounds. No murmur heard.     No friction rub. No gallop.   Pulmonary:      Effort: Pulmonary effort is normal. No respiratory distress.      Breath sounds: Normal breath sounds. No stridor. No wheezing or rales.   Abdominal:      General: Bowel sounds are normal. There is no distension.      Palpations: Abdomen is soft. There is no mass.      Tenderness: There is no abdominal tenderness. There is no guarding or rebound.     Musculoskeletal:         General: No tenderness. Normal range of motion.      Cervical back: Normal range of motion and neck supple.     Skin:     General: Skin is warm and dry.      Coloration: Skin is not pale.      Findings: No erythema or rash.     Neurological:      Mental Status: He is alert and oriented to person, place, and time.     Psychiatric:         Behavior: Behavior normal.            Lab Results: I personally reviewed relevant lab results.       Results for orders placed during the hospital encounter of 01/14/21    Colonoscopy    Impression  1.  Small polyp removed.  2. Diverticulosis scattered throughout the colon.  3. Long redundant colon.    RECOMMENDATION:  Repeat in 5 years due to a personal history of colon polyps, complications of diverticulosis discussed.  Follow-up in my office as needed               [1]   Current " Outpatient Medications   Medication Sig Dispense Refill    allopurinol (ZYLOPRIM) 300 mg tablet Take 300 mg by mouth in the morning.      Aspirin Low Dose 81 MG EC tablet TAKE 1 TABLET BY MOUTH DAILY 90 tablet 1    atorvastatin (LIPITOR) 40 mg tablet Take 1 tablet (40 mg total) by mouth daily 90 tablet 3    cholecalciferol (VITAMIN D3) 1,000 units tablet Take 1,000 Units by mouth in the morning.      Diclofenac Sodium (VOLTAREN) 1 % Apply 2 g topically 3 (three) times a day as needed (Pain) 150 g 3    Diclofenac Sodium (VOLTAREN) 1 % Apply 2 g topically 4 (four) times a day 350 g 0    famotidine (PEPCID) 20 mg tablet TAKE 1 TABLET BY MOUTH  TWICE DAILY 180 tablet 3    lidocaine (Lidoderm) 5 % Apply 1 patch topically over 12 hours daily Remove & Discard patch within 12 hours or as directed by MD 90 patch 0    metoprolol succinate (TOPROL-XL) 25 mg 24 hr tablet Take 0.5 tablets (12.5 mg total) by mouth daily 45 tablet 3    nitroglycerin (NITROSTAT) 0.4 mg SL tablet Place 1 tablet (0.4 mg total) under the tongue every 5 (five) minutes as needed for chest pain 30 tablet 1    ramipril (ALTACE) 5 mg capsule TAKE 1 CAPSULE BY MOUTH TWICE  DAILY 180 capsule 1    alendronate (FOSAMAX) 70 mg tablet Take 70 mg by mouth Once a week      chlorhexidine (PERIDEX) 0.12 % solution  (Patient not taking: Reported on 2/22/2024)      Diclofenac Sodium (VOLTAREN) 1 % Apply 2 g topically 3 (three) times a day as needed (Pain) (Patient not taking: Reported on 4/28/2025) 100 g 1    econazole nitrate 1 % cream Apply topically daily (Patient not taking: Reported on 3/11/2025) 85 g 2    lidocaine (Lidoderm) 5 % Apply 1 patch topically over 12 hours daily Remove & Discard patch within 12 hours or as directed by MD (Patient not taking: Reported on 11/25/2024) 30 patch 1    oxyCODONE (ROXICODONE) 5 immediate release tablet Take 1 tablet (5 mg total) by mouth every 6 (six) hours as needed for severe pain for up to 15 doses Max Daily Amount: 20 mg  (Patient not taking: Reported on 3/11/2025) 15 tablet 0    senna-docusate sodium (SENOKOT S) 8.6-50 mg per tablet Take 1 tablet by mouth 2 (two) times a day       No current facility-administered medications for this visit.

## 2025-05-21 NOTE — PATIENT INSTRUCTIONS
Scheduled date of EGD/colonoscopy (as of today): 6/5/25  Physician performing EGD/colonoscopy: Dr. Quan  Location of EGD/colonoscopy: [facility]  Desired bowel prep reviewed with patient: Golytely  Instructions reviewed with patient by: Kelli MEJIA  Clearances: n/a

## 2025-05-22 DIAGNOSIS — D50.9 IRON DEFICIENCY ANEMIA, UNSPECIFIED IRON DEFICIENCY ANEMIA TYPE: Primary | ICD-10-CM

## 2025-05-28 ENCOUNTER — PROCEDURE VISIT (OUTPATIENT)
Dept: PODIATRY | Facility: CLINIC | Age: 80
End: 2025-05-28
Payer: COMMERCIAL

## 2025-05-28 VITALS — OXYGEN SATURATION: 100 % | BODY MASS INDEX: 29.47 KG/M2 | WEIGHT: 199 LBS | HEART RATE: 83 BPM | HEIGHT: 69 IN

## 2025-05-28 DIAGNOSIS — B35.1 ONYCHOMYCOSIS: Primary | ICD-10-CM

## 2025-05-28 DIAGNOSIS — I73.9 PVD (PERIPHERAL VASCULAR DISEASE) (HCC): ICD-10-CM

## 2025-05-28 PROCEDURE — 11721 DEBRIDE NAIL 6 OR MORE: CPT | Performed by: PODIATRIST

## 2025-05-28 NOTE — PROGRESS NOTES
":  Assessment & Plan  Onychomycosis         PVD (peripheral vascular disease) ([facility])           The patient's clinical examination today is significant for brittle, thickened and dystrophic pedal nail plates with discoloration and subungual debris consistent with onychomycosis x10.  There are no open lesions.  The interdigital spaces are clear without maceration.  The pedal skin with decreased turgor.  There is an absence of hair growth.  DP pulses are palpable but diminished bilaterally, PT  pulses were non-palpable bilaterally.     The pedal nail plates are sharply debrided with a sterile nail clipper x10 without complication.  The nails were then reduced in thickness and girth utilizing a rotary bur.  There are no other acute pedal issues.       Recommend follow-up in 4 months or as needed.     History of Present Illness     Kiko Cruz is a 79 y.o. male   The patient presents today for follow-up of painful elongated pedal nail plates.  He has no other acute pedal issues today.      Review of Systems   Constitutional: Negative.    Respiratory:  Negative for shortness of breath.    Cardiovascular:  Negative for chest pain.   Skin: Negative.    Psychiatric/Behavioral: Negative.       Objective   Pulse 83   Ht 5' 9\" (1.753 m)   Wt 90.3 kg (199 lb)   SpO2 100%   BMI 29.39 kg/m²      Physical Exam  Constitutional:       Appearance: Normal appearance.   HENT:      Head: Normocephalic and atraumatic.     Cardiovascular:      Pulses:           Dorsalis pedis pulses are 1+ on the right side and 1+ on the left side.        Posterior tibial pulses are 0 on the right side and 0 on the left side.   Pulmonary:      Effort: Pulmonary effort is normal.   Feet:      Right foot:      Skin integrity: Skin integrity normal.      Toenail Condition: Right toenails are abnormally thick and long. Fungal disease present.     Left foot:      Skin integrity: Skin integrity normal.      Toenail Condition: Left toenails are abnormally " thick and long. Fungal disease present.     Comments: The patient's clinical examination today is significant for brittle, thickened and dystrophic pedal nail plates with discoloration and subungual debris consistent with onychomycosis x10.  There are no open lesions.  The interdigital spaces are clear without maceration.  The pedal skin with decreased turgor.  There is an absence of hair growth.  DP pulses are palpable but diminished bilaterally, PT  pulses were non-palpable bilaterally.    Skin:     General: Skin is warm and dry.      Capillary Refill: Capillary refill takes 2 to 3 seconds.     Neurological:      General: No focal deficit present.      Mental Status: He is alert and oriented to person, place, and time.     Psychiatric:         Mood and Affect: Mood normal.

## 2025-06-02 ENCOUNTER — TELEPHONE (OUTPATIENT)
Age: 80
End: 2025-06-02

## 2025-06-02 NOTE — TELEPHONE ENCOUNTER
Pt. Called back, reviewed medications prior to colonoscopy /EGD, advised he can take all of his regular mediations as prescribed at least 2 hours prior to procedure, pt. Denied taking any diabetic or anticoagulant medications, answered all pt. Questions, no further review needed, thank you

## 2025-06-02 NOTE — TELEPHONE ENCOUNTER
Pts wife on the consent called to confirm the procedures both Colonoscopy & EGD advised yes and confirmed also pt want to know about the meds if he can take it or not requested a call back to get an advise please call pt for further advise also was about to transfer to the nurse triage but pt hung up

## 2025-06-05 ENCOUNTER — HOSPITAL ENCOUNTER (OUTPATIENT)
Dept: GASTROENTEROLOGY | Facility: HOSPITAL | Age: 80
Setting detail: OUTPATIENT SURGERY
End: 2025-06-05
Attending: PHYSICIAN ASSISTANT
Payer: COMMERCIAL

## 2025-06-05 ENCOUNTER — ANESTHESIA EVENT (OUTPATIENT)
Dept: GASTROENTEROLOGY | Facility: HOSPITAL | Age: 80
End: 2025-06-05
Payer: COMMERCIAL

## 2025-06-05 ENCOUNTER — ANESTHESIA (OUTPATIENT)
Dept: GASTROENTEROLOGY | Facility: HOSPITAL | Age: 80
End: 2025-06-05
Payer: COMMERCIAL

## 2025-06-05 VITALS
SYSTOLIC BLOOD PRESSURE: 126 MMHG | DIASTOLIC BLOOD PRESSURE: 64 MMHG | BODY MASS INDEX: 29.47 KG/M2 | TEMPERATURE: 97.6 F | RESPIRATION RATE: 16 BRPM | HEIGHT: 69 IN | HEART RATE: 54 BPM | OXYGEN SATURATION: 98 % | WEIGHT: 199 LBS

## 2025-06-05 DIAGNOSIS — D50.9 IRON DEFICIENCY ANEMIA, UNSPECIFIED IRON DEFICIENCY ANEMIA TYPE: ICD-10-CM

## 2025-06-05 DIAGNOSIS — K25.3 ACUTE GASTRIC EROSION: Primary | ICD-10-CM

## 2025-06-05 PROCEDURE — 45378 DIAGNOSTIC COLONOSCOPY: CPT | Performed by: INTERNAL MEDICINE

## 2025-06-05 PROCEDURE — 43239 EGD BIOPSY SINGLE/MULTIPLE: CPT | Performed by: INTERNAL MEDICINE

## 2025-06-05 PROCEDURE — 88305 TISSUE EXAM BY PATHOLOGIST: CPT | Performed by: PATHOLOGY

## 2025-06-05 PROCEDURE — 43249 ESOPH EGD DILATION <30 MM: CPT | Performed by: INTERNAL MEDICINE

## 2025-06-05 RX ORDER — SODIUM CHLORIDE, SODIUM LACTATE, POTASSIUM CHLORIDE, CALCIUM CHLORIDE 600; 310; 30; 20 MG/100ML; MG/100ML; MG/100ML; MG/100ML
INJECTION, SOLUTION INTRAVENOUS CONTINUOUS PRN
Status: DISCONTINUED | OUTPATIENT
Start: 2025-06-05 | End: 2025-06-05

## 2025-06-05 RX ORDER — PANTOPRAZOLE SODIUM 40 MG/1
40 TABLET, DELAYED RELEASE ORAL DAILY
Qty: 30 TABLET | Refills: 2 | Status: SHIPPED | OUTPATIENT
Start: 2025-06-05 | End: 2025-06-13 | Stop reason: SDUPTHER

## 2025-06-05 RX ORDER — LIDOCAINE HYDROCHLORIDE 10 MG/ML
INJECTION, SOLUTION EPIDURAL; INFILTRATION; INTRACAUDAL; PERINEURAL AS NEEDED
Status: DISCONTINUED | OUTPATIENT
Start: 2025-06-05 | End: 2025-06-05

## 2025-06-05 RX ORDER — PROPOFOL 10 MG/ML
INJECTION, EMULSION INTRAVENOUS AS NEEDED
Status: DISCONTINUED | OUTPATIENT
Start: 2025-06-05 | End: 2025-06-05

## 2025-06-05 RX ADMIN — PROPOFOL 20 MG: 10 INJECTION, EMULSION INTRAVENOUS at 09:35

## 2025-06-05 RX ADMIN — LIDOCAINE HYDROCHLORIDE 50 MG: 10 INJECTION, SOLUTION EPIDURAL; INFILTRATION; INTRACAUDAL at 09:15

## 2025-06-05 RX ADMIN — SODIUM CHLORIDE, SODIUM LACTATE, POTASSIUM CHLORIDE, AND CALCIUM CHLORIDE: .6; .31; .03; .02 INJECTION, SOLUTION INTRAVENOUS at 09:00

## 2025-06-05 RX ADMIN — PROPOFOL 20 MG: 10 INJECTION, EMULSION INTRAVENOUS at 09:18

## 2025-06-05 RX ADMIN — PROPOFOL 20 MG: 10 INJECTION, EMULSION INTRAVENOUS at 09:30

## 2025-06-05 RX ADMIN — PROPOFOL 30 MG: 10 INJECTION, EMULSION INTRAVENOUS at 09:24

## 2025-06-05 RX ADMIN — PROPOFOL 20 MG: 10 INJECTION, EMULSION INTRAVENOUS at 09:26

## 2025-06-05 RX ADMIN — PROPOFOL 30 MG: 10 INJECTION, EMULSION INTRAVENOUS at 09:21

## 2025-06-05 RX ADMIN — PROPOFOL 20 MG: 10 INJECTION, EMULSION INTRAVENOUS at 09:32

## 2025-06-05 RX ADMIN — PROPOFOL 120 MG: 10 INJECTION, EMULSION INTRAVENOUS at 09:16

## 2025-06-05 NOTE — ANESTHESIA PREPROCEDURE EVALUATION
Procedure:  COLONOSCOPY  EGD    Relevant Problems   CARDIO   (+) Chest pain   (+) Coronary artery disease involving native coronary artery of native heart without angina pectoris   (+) Essential hypertension   (+) History of PTCA with 4 stents   (+) PVD (peripheral vascular disease) (HCC)      GI/HEPATIC   (+) Gastroesophageal reflux disease      /RENAL   (+) Prostate cancer (HCC)      MUSCULOSKELETAL   (+) Arthritis   (+) Chronic bilateral low back pain without sciatica      NEURO/PSYCH   (+) Chronic bilateral low back pain without sciatica   (+) Chronic left shoulder pain      PULMONARY   (+) Shortness of breath        Physical Exam    Airway     Mallampati score: II          Cardiovascular      Dental       Pulmonary      Neurological      Other Findings        Anesthesia Plan  ASA Score- 3     Anesthesia Type- IV sedation with anesthesia with ASA Monitors.         Additional Monitors:     Airway Plan:     Comment: IV sedation,  standard ASA monitors. Risks and benefits discussed with patient; patient consented and agrees to proceed.    I saw and evaluated the patient. If seen with CRNA, we have discussed the anesthetic plan and I am in agreement that the plan is appropriate for the patient.  .       Plan Factors-    Chart reviewed.   Existing labs reviewed.                   Induction-     Postoperative Plan- .   Monitoring Plan - Monitoring plan - standard ASA monitoring          Informed Consent- Anesthetic plan and risks discussed with patient.  I personally reviewed this patient with the CRNA. Discussed and agreed on the Anesthesia Plan with the CRNA..      NPO Status:  Vitals Value Taken Time   Date of last liquid 06/04/25 06/05/25 08:37   Time of last liquid 2345 06/05/25 08:37   Date of last solid 06/03/25 06/05/25 08:37   Time of last solid 1900 06/05/25 08:37

## 2025-06-05 NOTE — ANESTHESIA POSTPROCEDURE EVALUATION
Post-Op Assessment Note    CV Status:  Stable    Pain management: adequate       Mental Status:  Alert and awake   Hydration Status:  Euvolemic   PONV Controlled:  Controlled   Airway Patency:  Patent     Post Op Vitals Reviewed: Yes    No anethesia notable event occurred.    Staff: CRNA           Last Filed PACU Vitals:  Vitals Value Taken Time   Temp 97.9    Pulse 76    /62    Resp 18    SpO2 99

## 2025-06-10 ENCOUNTER — RESULTS FOLLOW-UP (OUTPATIENT)
Dept: GASTROENTEROLOGY | Facility: AMBULARY SURGERY CENTER | Age: 80
End: 2025-06-10

## 2025-06-10 PROCEDURE — 88305 TISSUE EXAM BY PATHOLOGIST: CPT | Performed by: PATHOLOGY

## 2025-06-11 NOTE — TELEPHONE ENCOUNTER
Patient wife called in she favio a fup with DR NDIAYE she requested this on 7/18 she wants to ask Dr Quan if the patient is to continue both Pantoprazole and Pepcid please contact patient [phone] with recommendations

## 2025-06-11 NOTE — TELEPHONE ENCOUNTER
Pt and wife calling to sched f/u apt for wk of 07/07. Nothing available w/Dr. Quan until Sept. Does not want to see a PA. While in the process of getting triage on the line for sooner apt call ended.

## 2025-06-12 NOTE — TELEPHONE ENCOUNTER
SPOKE WITH PT WIFE, REVIEWED RESULTS PER PROVIDER AND RECOMMENDATION TO TAKE PANTOPRAZOLE ONLY AT THIS TIME.

## 2025-06-13 DIAGNOSIS — K25.3 ACUTE GASTRIC EROSION: ICD-10-CM

## 2025-06-13 RX ORDER — PANTOPRAZOLE SODIUM 40 MG/1
40 TABLET, DELAYED RELEASE ORAL DAILY
Qty: 90 TABLET | Refills: 1 | Status: SHIPPED | OUTPATIENT
Start: 2025-06-13

## 2025-06-13 NOTE — TELEPHONE ENCOUNTER
Reason for call:   [x] Refill   [] Prior Auth  [] Other:     Office:   [] PCP/Provider -   [x] Specialty/Provider - Gastro    Medication: pantoprazole (PROTONIX) 40 mg tablet Take 1 tablet (40 mg total) by mouth daily,       Pharmacy: [facility] - [place], [place] - [address]   Does the patient have enough for 3 days?   [] Yes   [] No - Send as HP to POD    Mail Away Pharmacy   Does the patient have enough for 10 days?   [x] Yes   [] No - Send as HP to POD

## 2025-06-30 ENCOUNTER — APPOINTMENT (OUTPATIENT)
Dept: LAB | Facility: HOSPITAL | Age: 80
End: 2025-06-30
Attending: SPECIALIST
Payer: COMMERCIAL

## 2025-06-30 DIAGNOSIS — C61 PROSTATE CANCER (HCC): ICD-10-CM

## 2025-06-30 LAB — PSA SERPL-MCNC: 6.02 NG/ML (ref 0–4)

## 2025-06-30 PROCEDURE — G0103 PSA SCREENING: HCPCS

## 2025-06-30 PROCEDURE — 36415 COLL VENOUS BLD VENIPUNCTURE: CPT

## 2025-07-07 ENCOUNTER — NURSE TRIAGE (OUTPATIENT)
Age: 80
End: 2025-07-07

## 2025-07-07 NOTE — TELEPHONE ENCOUNTER
Spoke with wife, [facility] available for review in chart. Concerned with drop in HGB. Appt made 7/10  To note: Pt is not experiencing any alarming symptoms, denies SOB, dizziness, weakness

## 2025-07-07 NOTE — TELEPHONE ENCOUNTER
Regarding: high numbers  ----- Message from Demetrio PARKER sent at 7/7/2025  8:51 AM EDT -----  Pt's wife(on consent) calling to see if we have labs from [facility], done on the 3rd, in his chart. I told her I do not see them and she says its important we have them for his appt tomorrow with . I let her know his appt is actually on 07.18.25. she is very upset by this and says his numbers are up and its important he be seen by us sooner then that. She believes this was a mistake on our part and we should honor seeing him tomorrow. I checked the [facility] and [facility] office (including Alan since he saw  for a proc recently) but have nothing sooner then the appt on 07.18.25, which was booked 06.11 and confirmed buy pt on 07.04.25 via text, and nothing showing as ever booked on 07.08.25. Pt's wife wants a sooner appt for Pt, please advise?

## 2025-07-10 ENCOUNTER — OFFICE VISIT (OUTPATIENT)
Dept: GASTROENTEROLOGY | Facility: AMBULARY SURGERY CENTER | Age: 80
End: 2025-07-10
Payer: COMMERCIAL

## 2025-07-10 VITALS
WEIGHT: 202.9 LBS | HEART RATE: 64 BPM | BODY MASS INDEX: 30.05 KG/M2 | SYSTOLIC BLOOD PRESSURE: 126 MMHG | OXYGEN SATURATION: 98 % | HEIGHT: 69 IN | DIASTOLIC BLOOD PRESSURE: 64 MMHG

## 2025-07-10 DIAGNOSIS — K25.3 ACUTE GASTRIC EROSION: ICD-10-CM

## 2025-07-10 DIAGNOSIS — D50.9 IRON DEFICIENCY ANEMIA, UNSPECIFIED IRON DEFICIENCY ANEMIA TYPE: Primary | ICD-10-CM

## 2025-07-10 PROCEDURE — 99213 OFFICE O/P EST LOW 20 MIN: CPT

## 2025-07-10 NOTE — PROGRESS NOTES
Name: Kiko Cruz      : 1945      MRN: [id number]  Encounter Provider: Shelly Nelson PA-C  Encounter Date: 7/10/2025   Encounter department: [facility]  :  Assessment & Plan  Iron deficiency anemia, unspecified iron deficiency anemia type  Acute gastric erosion  This is a 79-year-old male with history of CAD, GERD, anemia and recently diagnosed acute gastric erosion noted on EGD last month.  He has been taking pantoprazole 40 mg daily as well as oral iron supplements.  He reports having dark, although not black stools intermittently.  He endorses shortness of breath on exertion and fatigue.  His most recent hemoglobin is 8.7 with a ferritin of 6.  This is in contrast to May 2025 labs with hemoglobin of 9.1 and ferritin of 4.  Previous celiac panel is negative.      Recommend continuation of pantoprazole 40 mg daily for minimum of 3 months  Patient has been taking oral iron, but without improvement in hemoglobin.  Mild improvement in ferritin.  He intermittently has dark stools, which are likely attributable to iron supplementation but could also be related to continued GI bleeding.  Agree with recommendation to transition to iron infusions  Continue to monitor stool output and hemoglobin  If no improvement in anemia following iron infusions would recommend capsule endoscopy for further evaluation of GI source of bleed           History of Present Illness   Kiko Cruz is a 79 y.o. male history of HTN, CAD s/p percutaneous coronary angioplasty, GERD, prostate cancer, dyslipidemia, who presents for follow-up regarding anemia.    Patient previously seen May 2025 at which time his hemoglobin had acutely dropped to 9.  He underwent colonoscopy and EGD 2025 with normal colonoscopy outside of multiple diverticula in the sigmoid and internal hemorrhoids.  EGD was notable for a small, deep erosion noted in the antrum of the stomach and a 4 cm hiatal hernia.   Biopsies were negative for H. pylori.  Patient was started on Protonix 40 mg daily.    Patient presents today following recent blood draw which shows hemoglobin 8.7.  He has been adherent to the daily Protonix 40 mg as well as has been taking daily ferrous sulfate supplements.  He reports that his stools are intermittently dark, although not black.  He denies any NSAID use.    He reports frequently feeling short of breath and fatigued.  His primary care has recommended transitioning to iron infusions.    He denies any smoking use.  He formerly drank alcohol but has not had alcohol in several years.    HPI  History obtained from: patient and patient's Significant Other  Review of Systems A complete review of systems is negative other than that noted above in the HPI.      Current Medications[1]  Objective   There were no vitals taken for this visit.    Physical Exam  Vitals and nursing note reviewed.   Constitutional:       General: He is not in acute distress.     Appearance: He is well-developed.   HENT:      Head: Normocephalic and atraumatic.     Eyes:      Conjunctiva/sclera: Conjunctivae normal.       Cardiovascular:      Rate and Rhythm: Normal rate and regular rhythm.      Heart sounds: No murmur heard.  Pulmonary:      Effort: Pulmonary effort is normal. No respiratory distress.      Breath sounds: Normal breath sounds. No wheezing or rales.   Abdominal:      General: Bowel sounds are normal. There is no distension.      Palpations: Abdomen is soft.      Tenderness: There is no abdominal tenderness. There is no guarding or rebound.     Musculoskeletal:         General: No swelling.      Cervical back: Neck supple.     Skin:     General: Skin is warm and dry.      Capillary Refill: Capillary refill takes less than 2 seconds.     Neurological:      General: No focal deficit present.      Mental Status: He is alert.     Psychiatric:         Mood and Affect: Mood normal.         Behavior: Behavior normal.         Lab Results: I personally reviewed relevant lab results.       Results for orders placed during the hospital encounter of 06/05/25    EGD    Impression  4 cm hiatal hernia  Stomach-small deep erosion noted in the antrum.  Biopsies done to check for H. pylori.  The duodenum appeared normal.      RECOMMENDATION:  Await pathology results  Avoid NSAIDs, pantoprazole daily for a couple of months            Joycelyn Quan MD               [1]   Current Outpatient Medications   Medication Sig Dispense Refill    alendronate (FOSAMAX) 70 mg tablet Take 70 mg by mouth Once a week      allopurinol (ZYLOPRIM) 300 mg tablet Take 300 mg by mouth in the morning.      Aspirin Low Dose 81 MG EC tablet TAKE 1 TABLET BY MOUTH DAILY 90 tablet 1    atorvastatin (LIPITOR) 40 mg tablet Take 1 tablet (40 mg total) by mouth daily 90 tablet 3    chlorhexidine (PERIDEX) 0.12 % solution  (Patient not taking: Reported on 2/22/2024)      cholecalciferol (VITAMIN D3) 1,000 units tablet Take 1,000 Units by mouth in the morning.      Diclofenac Sodium (VOLTAREN) 1 % Apply 2 g topically 3 (three) times a day as needed (Pain) 150 g 3    Diclofenac Sodium (VOLTAREN) 1 % Apply 2 g topically 3 (three) times a day as needed (Pain) (Patient not taking: Reported on 4/28/2025) 100 g 1    Diclofenac Sodium (VOLTAREN) 1 % Apply 2 g topically 4 (four) times a day 350 g 0    econazole nitrate 1 % cream Apply topically daily (Patient not taking: Reported on 3/11/2025) 85 g 2    famotidine (PEPCID) 20 mg tablet TAKE 1 TABLET BY MOUTH  TWICE DAILY 180 tablet 3    lidocaine (Lidoderm) 5 % Apply 1 patch topically over 12 hours daily Remove & Discard patch within 12 hours or as directed by MD (Patient not taking: Reported on 11/25/2024) 30 patch 1    lidocaine (Lidoderm) 5 % Apply 1 patch topically over 12 hours daily Remove & Discard patch within 12 hours or as directed by MD 90 patch 0    metoprolol succinate (TOPROL-XL) 25 mg 24 hr tablet Take 0.5 tablets (12.5  mg total) by mouth daily 45 tablet 3    nitroglycerin (NITROSTAT) 0.4 mg SL tablet Place 1 tablet (0.4 mg total) under the tongue every 5 (five) minutes as needed for chest pain 30 tablet 1    oxyCODONE (ROXICODONE) 5 immediate release tablet Take 1 tablet (5 mg total) by mouth every 6 (six) hours as needed for severe pain for up to 15 doses Max Daily Amount: 20 mg (Patient not taking: Reported on 3/11/2025) 15 tablet 0    pantoprazole (PROTONIX) 40 mg tablet Take 1 tablet (40 mg total) by mouth daily 90 tablet 1    ramipril (ALTACE) 5 mg capsule TAKE 1 CAPSULE BY MOUTH TWICE  DAILY 180 capsule 1    senna-docusate sodium (SENOKOT S) 8.6-50 mg per tablet Take 1 tablet by mouth 2 (two) times a day       No current facility-administered medications for this visit.

## 2025-07-10 NOTE — PATIENT INSTRUCTIONS
- Keep taking the pantoprazole daily    - Follow up with hematology for iron infusions    - In 3 months if hemoglobin continues to be low despite the iron infusions and pantoprazole, we will consider doing a capsule endoscopy to evaluate for any other GI source of bleed

## 2025-07-14 ENCOUNTER — HOSPITAL ENCOUNTER (OUTPATIENT)
Dept: RADIOLOGY | Facility: HOSPITAL | Age: 80
Discharge: HOME | End: 2025-07-14
Attending: STUDENT IN AN ORGANIZED HEALTH CARE EDUCATION/TRAINING PROGRAM
Payer: COMMERCIAL

## 2025-07-14 VITALS — HEIGHT: 69 IN | BODY MASS INDEX: 29.92 KG/M2 | WEIGHT: 202 LBS

## 2025-07-14 DIAGNOSIS — M25.552 BILATERAL HIP PAIN: ICD-10-CM

## 2025-07-14 DIAGNOSIS — M25.562 ACUTE PAIN OF LEFT KNEE: ICD-10-CM

## 2025-07-14 DIAGNOSIS — Z01.89 ENCOUNTER FOR LOWER EXTREMITY COMPARISON IMAGING STUDY: ICD-10-CM

## 2025-07-14 DIAGNOSIS — M25.551 BILATERAL HIP PAIN: ICD-10-CM

## 2025-07-14 DIAGNOSIS — M16.12 ARTHRITIS OF LEFT HIP: ICD-10-CM

## 2025-07-14 DIAGNOSIS — M17.12 PRIMARY OSTEOARTHRITIS OF LEFT KNEE: ICD-10-CM

## 2025-07-14 DIAGNOSIS — M25.562 LEFT KNEE PAIN, UNSPECIFIED CHRONICITY: ICD-10-CM

## 2025-07-14 DIAGNOSIS — M51.360 DEGENERATION OF INTERVERTEBRAL DISC OF LUMBAR REGION WITH DISCOGENIC BACK PAIN: Primary | ICD-10-CM

## 2025-07-14 PROBLEM — M54.16 RADICULOPATHY, LUMBAR REGION: Status: ACTIVE | Noted: 2025-07-14

## 2025-07-14 PROCEDURE — 99214 OFFICE O/P EST MOD 30 MIN: CPT | Performed by: STUDENT IN AN ORGANIZED HEALTH CARE EDUCATION/TRAINING PROGRAM

## 2025-07-14 PROCEDURE — 73562 X-RAY EXAM OF KNEE 3: CPT

## 2025-07-14 PROCEDURE — 20610 DRAIN/INJ JOINT/BURSA W/O US: CPT | Performed by: STUDENT IN AN ORGANIZED HEALTH CARE EDUCATION/TRAINING PROGRAM

## 2025-07-14 PROCEDURE — 73564 X-RAY EXAM KNEE 4 OR MORE: CPT

## 2025-07-14 PROCEDURE — 73521 X-RAY EXAM HIPS BI 2 VIEWS: CPT

## 2025-07-14 RX ORDER — LIDOCAINE 50 MG/G
1 PATCH TOPICAL DAILY
Qty: 90 PATCH | Refills: 0 | Status: SHIPPED | OUTPATIENT
Start: 2025-07-14 | End: 2025-10-12

## 2025-07-14 RX ORDER — ROPIVACAINE HYDROCHLORIDE 2 MG/ML
2 INJECTION, SOLUTION EPIDURAL; INFILTRATION; PERINEURAL
Status: COMPLETED | OUTPATIENT
Start: 2025-07-14 | End: 2025-07-14

## 2025-07-14 RX ORDER — METHYLPREDNISOLONE ACETATE 40 MG/ML
2 INJECTION, SUSPENSION INTRA-ARTICULAR; INTRALESIONAL; INTRAMUSCULAR; SOFT TISSUE
Status: COMPLETED | OUTPATIENT
Start: 2025-07-14 | End: 2025-07-14

## 2025-07-14 RX ORDER — BUPIVACAINE HYDROCHLORIDE 2.5 MG/ML
2 INJECTION, SOLUTION INFILTRATION; PERINEURAL
Status: COMPLETED | OUTPATIENT
Start: 2025-07-14 | End: 2025-07-14

## 2025-07-14 RX ADMIN — BUPIVACAINE HYDROCHLORIDE 2 ML: 2.5 INJECTION, SOLUTION INFILTRATION; PERINEURAL at 17:00

## 2025-07-14 RX ADMIN — METHYLPREDNISOLONE ACETATE 2 ML: 40 INJECTION, SUSPENSION INTRA-ARTICULAR; INTRALESIONAL; INTRAMUSCULAR; SOFT TISSUE at 17:00

## 2025-07-14 RX ADMIN — ROPIVACAINE HYDROCHLORIDE 2 ML: 2 INJECTION, SOLUTION EPIDURAL; INFILTRATION; PERINEURAL at 17:00

## 2025-07-14 NOTE — ASSESSMENT & PLAN NOTE
Patient is also symptomatic of degenerative disc disease. A referral to Physical therapy for core stabilization and lower extremity strengthening was provided. I do think patient would significantly benefit from PT at this time.   Orders:    Ambulatory Referral to Physical Therapy; Future    lidocaine (Lidoderm) 5 %; Apply 1 patch topically daily over 12 hours Remove & Discard patch within 12 hours or as directed by MD

## 2025-07-14 NOTE — PROGRESS NOTES
Ortho Sports Medicine Knee New Patient Visit       Assesment:   79 y.o. male left knee osteoarthritis and degenerative disc disease lumbar spine and left hip arthritis    Assessment & Plan  Primary osteoarthritis of left knee  Acute pain of left knee  Kiko is a pleasant 79 y.o. male who presents for initial evaluation of left knee pain. Upon review of the x-ray, a thorough history and my examination, Kiko is presenting with signs and symptoms consistent with mild tricompartmental osteoarthritis. Etiology and treatment options discussed, all his questions were addressed. A left knee corticosteroid injection was offered, accepted, and administered in clinic. Procedure tolerated well, outlined below, post injection protocol advised. Patient was provided a hinge knee brace to use as needed. Prescription for lidocaine patches sent to pharmacy. Lastly a referral to physical therapy was provided for lower extremity strengthening.  We discussed that his left posterolateral and posterior buttock pain appears to be more coming from his back than his hip.  We discussed that provocative testing was negative.  We discussed physical therapy for his back pain.  Continue activities as tolerated. Follow up in 6 weeks, repeat evaluation.       Orders:    XR knee 4+ vw left injury; Future    Large joint arthrocentesis: L knee    Ambulatory Referral to Physical Therapy; Future    lidocaine (Lidoderm) 5 %; Apply 1 patch topically daily over 12 hours Remove & Discard patch within 12 hours or as directed by MD [facility]    Degeneration of intervertebral disc of lumbar region with discogenic back pain  Patient is also symptomatic of degenerative disc disease. A referral to Physical therapy for core stabilization and lower extremity strengthening was provided. I do think patient would significantly benefit from PT at this time.   Orders:    Ambulatory Referral to Physical Therapy; Future    lidocaine (Lidoderm) 5 %; Apply 1 patch  topically daily over 12 hours Remove & Discard patch within 12 hours or as directed by MD    Arthritis of left hip               Conservative treatment:    Ice to knee for 20 minutes at least 1-2 times daily.  PT for ROM/strengthening to knee, hip and core.  OTC NSAIDS prn for pain.  Hinged knee brace ordered.    Imaging:    All imaging from today was reviewed by myself and explained to the patient.       Injection:    The risks and benefits of the injection (which include but are not limited to: infection, bleeding,damage to nerve/artery, need for further intervention), as well as the risks and benefits of all alternative treatments were explained and understood.  The patient elected to proceed with injection.  The procedure was done with aseptic technique, and the patient tolerated the procedure well with no complications.  A corticosteroid injection was performed.      Surgery:     No surgery is recommended at this point, continue with conservative treatment plan as noted.      Follow up:    Return in about 6 weeks (around 8/25/2025).  No x-rays.        Chief Complaint   Patient presents with    Left Knee - Pain       History of Present Illness:    The patient is a 79 y.o. male who presents for initial evaluation of left knee and left hip pain. Patients left knee pain started over the past 2 weeks, he denies injury or trauma. Pain is localized to the anterior medial knee, aggravated with weight bearing. Patient walks with a cane. He has been using lidocaine patches to the knee with benefit. In regards to his hip pain patient notes posterior hip/glute pain. He denies any groin pain. Pain does not radiate, he denies numbness or paresthesias. He has history of right hip total arthroplasty. History of chronic low back pain treated previously at [facility].     Knee Surgical History:  None    Past Medical, Social and Family History:  Past Medical History[1]  Past Surgical History[2]  Allergies[3]  Medications Ordered Prior  "to Encounter[4]  Social History     Socioeconomic History    Marital status: /Civil Union     Spouse name: Not on file    Number of children: Not on file    Years of education: Not on file    Highest education level: Not on file   Occupational History    Not on file   Tobacco Use    Smoking status: Former     Types: Cigarettes    Smokeless tobacco: Never    Tobacco comments:     smoked in high school   Vaping Use    Vaping status: Never Used   Substance and Sexual Activity    Alcohol use: Not Currently    Drug use: Never    Sexual activity: Not Currently     Partners: Female   Other Topics Concern    Not on file   Social History Narrative    Not on file     Social Drivers of Health     Financial Resource Strain: Not on file   Food Insecurity: Not on file   Transportation Needs: Not on file   Physical Activity: Not on file   Stress: Not on file   Social Connections: Not on file   Intimate Partner Violence: Not on file   Housing Stability: Not on file         I have reviewed the past medical, surgical, social and family history, medications and allergies as documented in the EMR.    Review of systems: ROS is negative other than that noted in the HPI.  Constitutional: Negative for fatigue and fever.   HENT: Negative for sore throat.    Respiratory: Negative for shortness of breath.    Cardiovascular: Negative for chest pain.   Gastrointestinal: Negative for abdominal pain.   Endocrine: Negative for cold intolerance and heat intolerance.   Genitourinary: Negative for flank pain.   Musculoskeletal: Negative for back pain.   Skin: Negative for rash.   Allergic/Immunologic: Negative for immunocompromised state.   Neurological: Negative for dizziness.   Psychiatric/Behavioral: Negative for agitation.      Physical Exam:    Height 5' 9\" (1.753 m), weight 91.6 kg (202 lb).    General/Constitutional: NAD, well developed, well nourished  HENT: Normocephalic, atraumatic  CV: Intact distal pulses, regular rate  Resp: No " respiratory distress or labored breathing  GI: Soft and non-tender   Lymphatic: No lymphadenopathy palpated  Neuro: Alert and Oriented x 3, no focal deficits  Psych: Normal mood, normal affect, normal judgement, normal behavior  Skin: Warm, dry, no rashes, no erythema      Knee Exam (focused):               Left   ROM:   0-120   Palpation: Effusion negative     MJL tenderness Negative     LJL tenderness Negative   Meniscus: Salima Negative    Apley's Compression Negative   Instability: Varus stable     Valgus stable   Special Tests: Lachman Negative     Posterior drawer Negative     Anterior drawer Negative     Pivot shift not tested     Dial not tested   Patella: Palpation no tenderness     Mobility 1/4     Apprehension Negative   Other: Single leg 1/4 squat not tested      LE NV Exam: +2 DP/PT pulses bilaterally  Sensation intact to light touch L2-S1 bilaterally     Bilateral hip ROM demonstrates no pain actively or passively    No calf tenderness to palpation bilaterally\    left Hip -  Patient presents with no anatomical deformity  Ambulates with antalgic gait pattern  Uses Single Point Cane assistive devices  (-) FADIR  (-) FABIR  (-) SLR  (-) passive circumduction   2+ TP and DP pulses with brisk capillary refill to the toes  Sural, saphenous, tibial, superficial and deep peroneal motor and sensory distribution intact  Sensation to light touch       Knee Imaging         I have personally reviewed and interpreted 4 radiographs of the bilateral knees including bilateral weightbearing AP, weightbearing PA flexion, sunrise, and weightbearing lateral of the affected knee which were reviewed in detail with the patient.  The joint spaces of the medial and lateral compartments show mild to moderate degenerative changes.  On the sunrise view, the patellofemoral compartment shows mild degenerative changes. No acute fracture or dislocation. No other bony pathology is present.      I also reviewed hip x-rays which show  status post right total hip arthroplasty with no evidence of hardware complication or failure.  There is also moderate left hip arthritis with joint space narrowing and osteophyte formation.  Radiology report is pending.  No acute fracture or dislocation.        Large joint arthrocentesis: L knee    Performed by: Sal Navarro MD  Authorized by: Sal Navarro MD    [facility] Protocol:  procedure performed by consultantConsent: Verbal consent obtained  Risks and benefits: risks, benefits and alternatives were discussed  Consent given by: patient  Timeout called at: 7/14/2025 5:38 PM.  Patient understanding: patient states understanding of the procedure being performed  Patient identity confirmed: verbally with patient  Supporting Documentation  Indications: pain and diagnostic evaluation     Is this a Visco injection? NoProcedure Details  Location: knee - L knee  Preparation: Patient was prepped and draped in the usual sterile fashion  Needle size: 22 G  Ultrasound guidance: no  Approach: anterolateral  Medications administered: 2 mL bupivacaine 0.25 %; 2 mL methylPREDNISolone acetate 40 mg/mL; 2 mL ropivacaine 0.2 %    Patient tolerance: patient tolerated the procedure well with no immediate complications  Dressing:  Sterile dressing applied            Scribe Attestation      I,:  Maryjane Weaver am acting as a scribe while in the presence of the attending physician.:       I,:  Sal Navarro MD personally performed the services described in this documentation    as scribed in my presence.:                  [1]   Past Medical History:  Diagnosis Date    Aortic aneurysm (HCC)     Cardiac disease     Colon polyp     Coronary artery disease     Gout     Hypercholesteremia     Hypertension     Kidney stone     Myocardial infarction (HCC)     5/04/2003: 2/8/2013    Osteoporosis 04/01/2023    pt reports   [2]   Past Surgical History:  Procedure Laterality Date    CARDIAC SURGERY      patient reports having  4 stents placed    COLONOSCOPY  07/2015    Due 7/2020    COLONOSCOPY      HERNIA REPAIR      LITHOTRIPSY      x4    OTHER SURGICAL HISTORY      stents    TX AMP F/TH 1/2 JT/PHALANX W/NEURECT LOCAL FLAP Right 11/27/2024    Procedure: RIGHT LONG FINGER - AMPUTATION (DISTAL INTERPHALANGEAL JOINT DISARTICULATION);  Surgeon: Sandeep Dietz MD;  Location: [facility];  Service: Orthopedics    TONSILLECTOMY      TOTAL HIP ARTHROPLASTY Right     UPPER GASTROINTESTINAL ENDOSCOPY     [3] No Known Allergies  [4]   Current Outpatient Medications on File Prior to Visit   Medication Sig Dispense Refill    allopurinol (ZYLOPRIM) 300 mg tablet Take 300 mg by mouth in the morning.      Aspirin Low Dose 81 MG EC tablet TAKE 1 TABLET BY MOUTH DAILY 90 tablet 1    atorvastatin (LIPITOR) 40 mg tablet Take 1 tablet (40 mg total) by mouth daily 90 tablet 3    cholecalciferol (VITAMIN D3) 1,000 units tablet Take 1,000 Units by mouth in the morning.      Diclofenac Sodium (VOLTAREN) 1 % Apply 2 g topically 3 (three) times a day as needed (Pain) 150 g 3    Diclofenac Sodium (VOLTAREN) 1 % Apply 2 g topically 4 (four) times a day 350 g 0    lidocaine (Lidoderm) 5 % Apply 1 patch topically over 12 hours daily Remove & Discard patch within 12 hours or as directed by MD 90 patch 0    metoprolol succinate (TOPROL-XL) 25 mg 24 hr tablet Take 0.5 tablets (12.5 mg total) by mouth daily 45 tablet 3    nitroglycerin (NITROSTAT) 0.4 mg SL tablet Place 1 tablet (0.4 mg total) under the tongue every 5 (five) minutes as needed for chest pain 30 tablet 1    pantoprazole (PROTONIX) 40 mg tablet Take 1 tablet (40 mg total) by mouth daily 90 tablet 1    ramipril (ALTACE) 5 mg capsule TAKE 1 CAPSULE BY MOUTH TWICE  DAILY 180 capsule 1    alendronate (FOSAMAX) 70 mg tablet Take 70 mg by mouth Once a week      chlorhexidine (PERIDEX) 0.12 % solution  (Patient not taking: Reported on 2/22/2024)      Diclofenac Sodium (VOLTAREN) 1 % Apply 2 g topically 3  (three) times a day as needed (Pain) (Patient not taking: Reported on 4/28/2025) 100 g 1    econazole nitrate 1 % cream Apply topically daily (Patient not taking: Reported on 3/11/2025) 85 g 2    famotidine (PEPCID) 20 mg tablet TAKE 1 TABLET BY MOUTH  TWICE DAILY (Patient not taking: Reported on 7/10/2025) 180 tablet 3    lidocaine (Lidoderm) 5 % Apply 1 patch topically over 12 hours daily Remove & Discard patch within 12 hours or as directed by MD (Patient not taking: Reported on 11/25/2024) 30 patch 1    oxyCODONE (ROXICODONE) 5 immediate release tablet Take 1 tablet (5 mg total) by mouth every 6 (six) hours as needed for severe pain for up to 15 doses Max Daily Amount: 20 mg (Patient not taking: Reported on 3/11/2025) 15 tablet 0    senna-docusate sodium (SENOKOT S) 8.6-50 mg per tablet Take 1 tablet by mouth 2 (two) times a day       No current facility-administered medications on file prior to visit.

## 2025-07-14 NOTE — ASSESSMENT & PLAN NOTE
Kiko is a pleasant 79 y.o. male who presents for initial evaluation of left knee pain. Upon review of the x-ray, a thorough history and my examination, Kiko is presenting with signs and symptoms consistent with mild tricompartmental osteoarthritis. Etiology and treatment options discussed, all his questions were addressed. A left knee corticosteroid injection was offered, accepted, and administered in clinic. Procedure tolerated well, outlined below, post injection protocol advised. Patient was provided a hinge knee brace to use as needed. Prescription for lidocaine patches sent to pharmacy. Lastly a referral to physical therapy was provided for lower extremity strengthening.  We discussed that his left posterolateral and posterior buttock pain appears to be more coming from his back than his hip.  We discussed that provocative testing was negative.  We discussed physical therapy for his back pain.  Continue activities as tolerated. Follow up in 6 weeks, repeat evaluation.       Orders:    XR knee 4+ vw left injury; Future    Large joint arthrocentesis: L knee    Ambulatory Referral to Physical Therapy; Future    lidocaine (Lidoderm) 5 %; Apply 1 patch topically daily over 12 hours Remove & Discard patch within 12 hours or as directed by MD [facility]

## (undated) DEVICE — PAD CAST 4 IN COTTON NON STERILE

## (undated) DEVICE — CYSTO TUBING TUR Y IRRIGATION

## (undated) DEVICE — STERILE BETHLEHEM PLASTIC HAND: Brand: CARDINAL HEALTH

## (undated) DEVICE — SUT CHROMIC 4-0 P-3 18 MM 1654G

## (undated) DEVICE — INTENDED FOR TISSUE SEPARATION, AND OTHER PROCEDURES THAT REQUIRE A SHARP SURGICAL BLADE TO PUNCTURE OR CUT.: Brand: BARD-PARKER ® CARBON RIB-BACK BLADES

## (undated) DEVICE — CURITY STRETCH BANDAGE: Brand: CURITY

## (undated) DEVICE — GLOVE SRG BIOGEL 6.5

## (undated) DEVICE — SPONGE SCRUB 4 PCT CHLORHEXIDINE

## (undated) DEVICE — GLOVE INDICATOR PI UNDERGLOVE SZ 7 BLUE

## (undated) DEVICE — GLOVE SRG BIOGEL ECLIPSE 7

## (undated) DEVICE — OCCLUSIVE GAUZE STRIP,3% BISMUTH TRIBROMOPHENATE IN PETROLATUM BLEND: Brand: XEROFORM

## (undated) DEVICE — GAUZE SPONGES,16 PLY: Brand: CURITY

## (undated) DEVICE — NEEDLE 25G X 1 1/2

## (undated) DEVICE — CUFF TOURNIQUET 18 X 4 IN QUICK CONNECT DISP 1 BLADDER